# Patient Record
Sex: FEMALE | Race: WHITE | NOT HISPANIC OR LATINO | Employment: OTHER | URBAN - METROPOLITAN AREA
[De-identification: names, ages, dates, MRNs, and addresses within clinical notes are randomized per-mention and may not be internally consistent; named-entity substitution may affect disease eponyms.]

---

## 2017-01-16 ENCOUNTER — OFFICE VISIT (OUTPATIENT)
Dept: UROLOGY | Facility: CLINIC | Age: 82
End: 2017-01-16
Payer: MEDICARE

## 2017-01-16 VITALS
SYSTOLIC BLOOD PRESSURE: 138 MMHG | BODY MASS INDEX: 20.38 KG/M2 | HEIGHT: 63 IN | WEIGHT: 115 LBS | RESPIRATION RATE: 16 BRPM | HEART RATE: 60 BPM | DIASTOLIC BLOOD PRESSURE: 62 MMHG

## 2017-01-16 DIAGNOSIS — N39.0 RECURRENT UTI: Primary | ICD-10-CM

## 2017-01-16 PROCEDURE — 1160F RVW MEDS BY RX/DR IN RCRD: CPT | Mod: S$GLB,,, | Performed by: UROLOGY

## 2017-01-16 PROCEDURE — 1159F MED LIST DOCD IN RCRD: CPT | Mod: S$GLB,,, | Performed by: UROLOGY

## 2017-01-16 PROCEDURE — 1126F AMNT PAIN NOTED NONE PRSNT: CPT | Mod: S$GLB,,, | Performed by: UROLOGY

## 2017-01-16 PROCEDURE — 99999 PR PBB SHADOW E&M-EST. PATIENT-LVL III: CPT | Mod: PBBFAC,,, | Performed by: UROLOGY

## 2017-01-16 PROCEDURE — 1157F ADVNC CARE PLAN IN RCRD: CPT | Mod: S$GLB,,, | Performed by: UROLOGY

## 2017-01-16 PROCEDURE — 99214 OFFICE O/P EST MOD 30 MIN: CPT | Mod: S$GLB,,, | Performed by: UROLOGY

## 2017-01-16 NOTE — MR AVS SNAPSHOT
[facility]  [address]  [place] [address]  Phone: [phone]                  Lang Kumar   2017 3:00 PM   Office Visit    Description:  Female : 1932   Provider:  Jacklyn Byrne MD   [facility]  [facility]           Reason for Visit     Urinary Tract Infection           Diagnoses this Visit        Comments    Recurrent UTI    -  Primary            To Do List           Future Appointments        Provider Department Dept Phone    2017 10:00 AM Moon Mccallum MD [facility] [phone]    2017 10:15 AM LAB, [place] [facility] [phone]    3/9/2017 2:00 PM Erick Hernandez MD [facility] Card[facility] [phone]      Goals (5 Years of Data)     None      Follow-Up and Disposition     Return if symptoms worsen or fail to improve.      [facility]     [facility] Care [facility] -  Assistance  Registered nurses in the [facility] provide clinical advisement, health education, appointment booking, and other advisory services.  Call for this free service at [phone].             Medications           Message regarding Medications     Verify the changes and/or additions to your medication regime listed below are the same as discussed with your clinician today.  If any of these changes or additions are incorrect, please notify your healthcare provider.             Verify that the below list of medications is an accurate representation of the medications you are currently taking.  If none reported, the list may be blank. If incorrect, please contact your healthcare provider. Carry this list with you in case of emergency.           Current Medications     aspirin (ECOTRIN) 81 MG EC tablet Take 1 tablet (81 mg total) by mouth once daily.    atorvastatin (LIPITOR) 10 MG tablet Take 1 tablet (10 mg total) by mouth once daily.    ergocalciferol (VITAMIN D2) 50,000 unit Cap Take 1  "capsule (50,000 Units total) by mouth every 7 days.    multivitamin (ONE DAILY MULTIVITAMIN) per tablet Take 1 tablet by mouth once daily.           Clinical Reference Information           Vital Signs - Last Recorded  Most recent update: 1/16/2017  3:42 PM by Brittany Graves MA    BP Pulse Resp Ht Wt BMI    138/62 60 16 5' 3" (1.6 m) 52.2 kg (115 lb) 20.37 kg/m2      Blood Pressure          Most Recent Value    BP  138/62      Allergies as of 1/16/2017     No Known Allergies      Immunizations Administered on Date of Encounter - 1/16/2017     None      "

## 2017-01-16 NOTE — LETTER
January 16, 2017      Crystal Chandler, FNP-C  [address]  [facility] [address]           [facility]  [address]  [facility] [address]  Phone: [phone]          Patient: Lang Kumar   MR Number: [id number]   YOB: 1932   Date of Visit: 1/16/2017       Dear Crystal Chandler:    Thank you for referring Lang Kumar to me for evaluation. Attached you will find relevant portions of my assessment and plan of care.    If you have questions, please do not hesitate to call me. I look forward to following Lang Kumar along with you.    Sincerely,    Jacklyn Byrne MD    Enclosure  CC:  No Recipients    If you would like to receive this communication electronically, please contact [email or web address] or [phone] to request more information on [employer] access.    For providers and/or their staff who would like to refer a patient to [facility], please contact us through our one-stop-shop provider referral line, [facility] , at [phone].    If you feel you have received this communication in error or would no longer like to receive these types of communications, please e-mail [email or web address]

## 2017-01-16 NOTE — PROGRESS NOTES
"  Subjective:       Lang Kumar is a 84 y.o. female who is a new patient who was referred by [facility] NP for evaluation of dysuria.      She is a previous pt of Dr Brice. She is s/p LeForte colpocleisis, perineorrhaphy, posterior repair on 8/19/2014. She had UTIs prior to this per her report but was initially improved. She reported dysuria and vaginal burning around time of UTI. She was treated for UTI in 11/16 (Macrobid). She was using Azo for dysuria PRN. Her symptoms have cleared and she is feeling well today.    UCx:   12/16 - neg  11/16 - >100k E coli, multi-resistant      The following portions of the patient's history were reviewed and updated as appropriate: allergies, current medications, past family history, past medical history, past social history, past surgical history and problem list.    Review of Systems  Constitutional: no fever or chills  ENT: no nasal congestion or sore throat  Respiratory: no cough or shortness of breath  Cardiovascular: no chest pain or palpitations  Gastrointestinal: no nausea or vomiting, tolerating diet  Genitourinary: as per HPI  Hematologic/Lymphatic: no easy bruising or lymphadenopathy  Musculoskeletal: no arthralgias or myalgias  Skin: no rashes or lesions  Neurological: no seizures or tremors  Behavioral/Psych: no auditory or visual hallucinations       Objective:    Vitals:   Visit Vitals    /62    Pulse 60    Resp 16    Ht 5' 3" (1.6 m)    Wt 52.2 kg (115 lb)    BMI 20.37 kg/m2       Physical Exam   General: well developed, well nourished in no acute distress  Head: normocephalic, atraumatic  Neck: supple, trachea midline, no obvious enlargement of thyroid  HEENT: EOMI, mucus membranes moist, sclera anicteric, no hearing impairment  Lungs: symmetric expansion, non-labored breathing  Cardiovascular: regular rate and rhythm, normal pulses  Abdomen: soft, non tender, non distended, no palpable masses, no hepatosplenomegaly, no hernias, no CVA " tenderness  Musculoskeletal: no peripheral edema, normal ROM in bilateral upper and lower extremities  Lymphatics: no cervical or inguinal lymphadenopathy  Skin: no rashes or lesions  Neuro: alert and oriented x 3, no gross deficits  Psych: normal judgment and insight, normal mood/affect and non-anxious  Genitourinary:   patient declined exam      Lab Review   Urine analysis today in clinic shows negative for all components     Lab Results   Component Value Date    WBC 12.26 11/21/2016    HGB 14.0 11/21/2016    HCT 42.2 11/21/2016    MCV 94 11/21/2016     11/21/2016     Lab Results   Component Value Date    CREATININE 0.8 11/21/2016    BUN 10 11/21/2016       Imaging  NA         Assessment/Plan:      1. Recurrent UTI    - Not as much of a issue recently after colpocleisis   - UA clear today   - One UTI in 11/16   - Doing great otherwise, will hold on further workup   - Call with UTI symptoms for UCx   - Discussed Axo, okay to take PRN only         Follow up PRN

## 2017-02-01 ENCOUNTER — OFFICE VISIT (OUTPATIENT)
Dept: FAMILY MEDICINE | Facility: CLINIC | Age: 82
End: 2017-02-01
Payer: MEDICARE

## 2017-02-01 VITALS
HEIGHT: 63 IN | HEART RATE: 71 BPM | BODY MASS INDEX: 21.07 KG/M2 | SYSTOLIC BLOOD PRESSURE: 122 MMHG | TEMPERATURE: 98 F | OXYGEN SATURATION: 97 % | DIASTOLIC BLOOD PRESSURE: 68 MMHG | WEIGHT: 118.94 LBS

## 2017-02-01 DIAGNOSIS — M81.0 OSTEOPOROSIS, POST-MENOPAUSAL: Primary | ICD-10-CM

## 2017-02-01 DIAGNOSIS — E55.9 VITAMIN D DEFICIENCY DISEASE: ICD-10-CM

## 2017-02-01 DIAGNOSIS — I70.0 ATHEROSCLEROSIS OF AORTIC ARCH: ICD-10-CM

## 2017-02-01 PROCEDURE — 99499 UNLISTED E&M SERVICE: CPT | Mod: S$GLB,,, | Performed by: INTERNAL MEDICINE

## 2017-02-01 PROCEDURE — 1126F AMNT PAIN NOTED NONE PRSNT: CPT | Mod: S$GLB,,, | Performed by: INTERNAL MEDICINE

## 2017-02-01 PROCEDURE — 1160F RVW MEDS BY RX/DR IN RCRD: CPT | Mod: S$GLB,,, | Performed by: INTERNAL MEDICINE

## 2017-02-01 PROCEDURE — 99214 OFFICE O/P EST MOD 30 MIN: CPT | Mod: S$GLB,,, | Performed by: INTERNAL MEDICINE

## 2017-02-01 PROCEDURE — 1159F MED LIST DOCD IN RCRD: CPT | Mod: S$GLB,,, | Performed by: INTERNAL MEDICINE

## 2017-02-01 PROCEDURE — 1157F ADVNC CARE PLAN IN RCRD: CPT | Mod: S$GLB,,, | Performed by: INTERNAL MEDICINE

## 2017-02-01 PROCEDURE — 99999 PR PBB SHADOW E&M-EST. PATIENT-LVL III: CPT | Mod: PBBFAC,,, | Performed by: INTERNAL MEDICINE

## 2017-02-01 RX ORDER — ALENDRONATE SODIUM 70 MG/1
70 TABLET ORAL
Qty: 12 TABLET | Refills: 3 | Status: SHIPPED | OUTPATIENT
Start: 2017-02-01 | End: 2018-04-09 | Stop reason: SDUPTHER

## 2017-02-01 NOTE — PROGRESS NOTES
HISTORY OF PRESENT ILLNESS:  Lang Kumar is a 84 y.o. female who presents to the clinic today for Hyperlipidemia and Follow-up  .  The patient presents to clinic today for treatment of osteoporosis which was found on recent bone density test.  She was started on high-dose vitamin D supplementation.  She denies any fracture.  She reports fair amounts of calcium in her diet.  She gets minimal sun exposure.      PAST MEDICAL HISTORY:  Past Medical History   Diagnosis Date    Abnormal exam or test finding 12/2011     coronary calcium score 204 = moderate plaque burden and high coronary heart disease risk    Arthritis     Atherosclerosis of aortic arch      - noted on CXR 11/2016    Borderline hyperlipidemia     Coronary artery disease involving native coronary artery of native heart without angina pectoris 11/30/2016    Osteoporosis, post-menopausal     Urge and stress incontinence      uses pessary; followed by gynecology    Uterine prolapse     Vitamin D deficiency disease        PAST SURGICAL HISTORY:  Past Surgical History   Procedure Laterality Date    Cosmetic surgery      Cataract extraction, bilateral         SOCIAL HISTORY:  Social History     Social History    Marital status:      Spouse name: N/A    Number of children: 3    Years of education: N/A     Occupational History    teacher - retired      Social History Main Topics    Smoking status: Former Smoker     Packs/day: 0.50     Types: Cigarettes     Quit date: 5/22/1970    Smokeless tobacco: Never Used    Alcohol use 1.2 oz/week     2 Glasses of wine per week      Comment: once a week    Drug use: No    Sexual activity: No     Other Topics Concern    Not on file     Social History Narrative       FAMILY HISTORY:  Family History   Problem Relation Age of Onset    Lung cancer Sister      smoker    Coronary artery disease Father     Stroke Mother     Fibromyalgia Sister     Arthritis Sister      back and knee     Breast  "cancer Neg Hx     Ovarian cancer Neg Hx     Diabetes Neg Hx     Colon cancer Neg Hx        ALLERGIES AND MEDICATIONS: updated and reviewed.  Review of patient's allergies indicates:  No Known Allergies  Medication List with Changes/Refills   New Medications    ALENDRONATE (FOSAMAX) 70 MG TABLET    Take 1 tablet (70 mg total) by mouth every 7 days.   Current Medications    ASPIRIN (ECOTRIN) 81 MG EC TABLET    Take 1 tablet (81 mg total) by mouth once daily.    ATORVASTATIN (LIPITOR) 10 MG TABLET    Take 1 tablet (10 mg total) by mouth once daily.    ERGOCALCIFEROL (VITAMIN D2) 50,000 UNIT CAP    Take 1 capsule (50,000 Units total) by mouth every 7 days.    MULTIVITAMIN (ONE DAILY MULTIVITAMIN) PER TABLET    Take 1 tablet by mouth once daily.            REVIEW OF SYSTEMS:  General ROS: negative for - chills, fever or malaise  Psychological ROS: negative for - anxiety, depression or suicidal ideation  Allergy and Immunology ROS: negative for - hives  Hematological and Lymphatic ROS: negative for - swollen lymph nodes  Endocrine ROS: negative for - polydipsia/polyuria or temperature intolerance  Respiratory ROS: no cough, shortness of breath, or wheezing  Cardiovascular ROS: no chest pain or dyspnea on exertion  Gastrointestinal ROS: no abdominal pain, change in bowel habits, or black or bloody stools  Dermatological ROS: negative for mole changes and rash  Musculoskeletal ROS: negative for - gait disturbance, joint swelling, muscle pain or muscular weakness  Neurological ROS: no TIA or stroke symptoms      PHYSICAL EXAM:   Vitals:    02/01/17 1012   BP: 122/68   Pulse: 71   Temp: 98.4 °F (36.9 °C)     Weight: 53.9 kg (118 lb 15 oz)   Height: 5' 3" (160 cm)   Body mass index is 21.07 kg/(m^2).     General appearance - alert, well appearing, and in no distress and normal appearing weight  Mental status - alert, oriented to person, place, and time, normal mood, behavior, speech, dress, motor activity, and thought " processes  Eyes - pupils equal and reactive, extraocular eye movements intact, sclera anicteric  Mouth - mucous membranes moist, pharynx normal without lesions  Neck - supple, no significant adenopathy, carotids upstroke normal bilaterally, no bruits, thyroid exam: thyroid is normal in size without nodules or tenderness  Lymphatics - no palpable lymphadenopathy  Chest - clear to auscultation, no wheezes, rales or rhonchi, symmetric air entry  Heart - normal rate and regular rhythm, no gallops noted  Abdomen - soft, nontender, nondistended, no masses or organomegaly  Neurological - alert, oriented, normal speech, no focal findings or movement disorder noted, cranial nerves II through XII intact  Musculoskeletal - no joint tenderness, deformity or swelling, no muscular tenderness noted  Extremities - peripheral pulses normal, no pedal edema, no clubbing or cyanosis  Skin - normal coloration and turgor, no rashes, no suspicious skin lesions noted      ASSESSMENT AND PLAN:  1. Osteoporosis, post-menopausal  Based on recent bone density test results I'll start the patient on Fosamax.  We discussed risks, benefits, and side effects.  She will let me know if she has any problems.  Recheck bone density test in 2 years.  - alendronate (FOSAMAX) 70 MG tablet; Take 1 tablet (70 mg total) by mouth every 7 days.  Dispense: 12 tablet; Refill: 3    2. Vitamin D deficiency disease  We discussed adequate supplementation.  She is currently on 50,000 units once a week.  Recheck blood work in one year.    3. Atherosclerosis of aortic arch  Patient with Atherosclerosis of the Aorta.  Stable/asymptomatic. Currently stable on lipid lowering medication and b/p monitoring.           Return in about 4 months (around 6/1/2017), or if symptoms worsen or fail to improve, for follow up chronic medical conditions.. or sooner as needed.

## 2017-03-09 ENCOUNTER — LAB VISIT (OUTPATIENT)
Dept: LAB | Facility: HOSPITAL | Age: 82
End: 2017-03-09
Attending: INTERNAL MEDICINE
Payer: MEDICARE

## 2017-03-09 DIAGNOSIS — I25.10 CORONARY ARTERY DISEASE INVOLVING NATIVE CORONARY ARTERY OF NATIVE HEART WITHOUT ANGINA PECTORIS: ICD-10-CM

## 2017-03-09 LAB
ALBUMIN SERPL BCP-MCNC: 3.8 G/DL
ALP SERPL-CCNC: 68 U/L
ALT SERPL W/O P-5'-P-CCNC: 14 U/L
AST SERPL-CCNC: 18 U/L
BILIRUB DIRECT SERPL-MCNC: 0.2 MG/DL
BILIRUB SERPL-MCNC: 0.6 MG/DL
CHOLEST/HDLC SERPL: 3.4 {RATIO}
HDL/CHOLESTEROL RATIO: 29.5 %
HDLC SERPL-MCNC: 251 MG/DL
HDLC SERPL-MCNC: 74 MG/DL
LDLC SERPL CALC-MCNC: 157.8 MG/DL
NONHDLC SERPL-MCNC: 177 MG/DL
PROT SERPL-MCNC: 7.2 G/DL
TRIGL SERPL-MCNC: 96 MG/DL

## 2017-03-09 PROCEDURE — 36415 COLL VENOUS BLD VENIPUNCTURE: CPT | Mod: PO

## 2017-03-09 PROCEDURE — 80076 HEPATIC FUNCTION PANEL: CPT

## 2017-03-09 PROCEDURE — 80061 LIPID PANEL: CPT

## 2017-03-10 ENCOUNTER — OFFICE VISIT (OUTPATIENT)
Dept: CARDIOLOGY | Facility: CLINIC | Age: 82
End: 2017-03-10
Payer: MEDICARE

## 2017-03-10 VITALS
HEART RATE: 76 BPM | RESPIRATION RATE: 15 BRPM | DIASTOLIC BLOOD PRESSURE: 76 MMHG | HEIGHT: 63 IN | BODY MASS INDEX: 20.2 KG/M2 | SYSTOLIC BLOOD PRESSURE: 150 MMHG | WEIGHT: 114 LBS | OXYGEN SATURATION: 95 %

## 2017-03-10 DIAGNOSIS — E78.5 HYPERLIPIDEMIA, UNSPECIFIED HYPERLIPIDEMIA TYPE: ICD-10-CM

## 2017-03-10 DIAGNOSIS — I25.10 CORONARY ARTERY DISEASE INVOLVING NATIVE CORONARY ARTERY OF NATIVE HEART WITHOUT ANGINA PECTORIS: Primary | ICD-10-CM

## 2017-03-10 DIAGNOSIS — I70.0 ATHEROSCLEROSIS OF AORTIC ARCH: ICD-10-CM

## 2017-03-10 PROCEDURE — 1160F RVW MEDS BY RX/DR IN RCRD: CPT | Mod: S$GLB,,, | Performed by: INTERNAL MEDICINE

## 2017-03-10 PROCEDURE — 1159F MED LIST DOCD IN RCRD: CPT | Mod: S$GLB,,, | Performed by: INTERNAL MEDICINE

## 2017-03-10 PROCEDURE — 99214 OFFICE O/P EST MOD 30 MIN: CPT | Mod: S$GLB,,, | Performed by: INTERNAL MEDICINE

## 2017-03-10 PROCEDURE — 99999 PR PBB SHADOW E&M-EST. PATIENT-LVL III: CPT | Mod: PBBFAC,,, | Performed by: INTERNAL MEDICINE

## 2017-03-10 PROCEDURE — 1157F ADVNC CARE PLAN IN RCRD: CPT | Mod: S$GLB,,, | Performed by: INTERNAL MEDICINE

## 2017-03-10 PROCEDURE — 1126F AMNT PAIN NOTED NONE PRSNT: CPT | Mod: S$GLB,,, | Performed by: INTERNAL MEDICINE

## 2017-03-10 RX ORDER — ATORVASTATIN CALCIUM 10 MG/1
10 TABLET, FILM COATED ORAL DAILY
Qty: 90 TABLET | Refills: 3 | Status: SHIPPED | OUTPATIENT
Start: 2017-03-10 | End: 2017-06-29

## 2017-03-10 NOTE — PROGRESS NOTES
CARDIOVASCULAR PROGRESS NOTE    REASON FOR CONSULT:   Lang Kumar is a 84 y.o. female who presents for f/u of presumed CAD.    PCP: Xena  HISTORY OF PRESENT ILLNESS:   The patient returns for follow-up.  She reports generally stable status without angina or dyspnea.  There's been no palpitations, lightheadedness, dizziness, or syncope.  She denies PND, orthopnea, or lower extremity edema.  She's had no melena, hematuria, or claudicant symptoms.    I reviewed the patient's lipid panel, noting an increase in her total and LDL cholesterol.  It appears that the patient is not taking her atorvastatin as prescribed, and she seems to blame this on a memory disturbance.  I've instructed her to get the Lipitor filled, and we'll plan to repeat her lipid panel in 3 months.    CARDIOVASCULAR HISTORY:   CAD (presumed, Cor Ca+ 204 12/2011)    PAST MEDICAL HISTORY:     Past Medical History:   Diagnosis Date    Abnormal exam or test finding 12/2011    coronary calcium score 204 = moderate plaque burden and high coronary heart disease risk    Arthritis     Atherosclerosis of aortic arch     - noted on CXR 11/2016    Borderline hyperlipidemia     Coronary artery disease involving native coronary artery of native heart without angina pectoris 11/30/2016    Osteoporosis, post-menopausal     Urge and stress incontinence     uses pessary; followed by gynecology    Uterine prolapse     Vitamin D deficiency disease        PAST SURGICAL HISTORY:     Past Surgical History:   Procedure Laterality Date    CATARACT EXTRACTION, BILATERAL      COSMETIC SURGERY         ALLERGIES AND MEDICATION:   Review of patient's allergies indicates:  No Known Allergies  Previous Medications    ALENDRONATE (FOSAMAX) 70 MG TABLET    Take 1 tablet (70 mg total) by mouth every 7 days.    ASPIRIN (ECOTRIN) 81 MG EC TABLET    Take 1 tablet (81 mg total) by mouth once daily.    ATORVASTATIN (LIPITOR) 10 MG TABLET    Take 1 tablet (10 mg total) by  mouth once daily.    ERGOCALCIFEROL (VITAMIN D2) 50,000 UNIT CAP    Take 1 capsule (50,000 Units total) by mouth every 7 days.    MULTIVITAMIN (ONE DAILY MULTIVITAMIN) PER TABLET    Take 1 tablet by mouth once daily.       SOCIAL HISTORY:     Social History     Social History    Marital status:      Spouse name: N/A    Number of children: 3    Years of education: N/A     Occupational History    teacher - retired      Social History Main Topics    Smoking status: Former Smoker     Packs/day: 0.50     Types: Cigarettes     Quit date: 5/22/1970    Smokeless tobacco: Never Used    Alcohol use 1.2 oz/week     2 Glasses of wine per week      Comment: once a week    Drug use: No    Sexual activity: No     Other Topics Concern    Not on file     Social History Narrative       FAMILY HISTORY:     Family History   Problem Relation Age of Onset    Lung cancer Sister      smoker    Coronary artery disease Father     Stroke Mother     Fibromyalgia Sister     Arthritis Sister      back and knee     Breast cancer Neg Hx     Ovarian cancer Neg Hx     Diabetes Neg Hx     Colon cancer Neg Hx        REVIEW OF SYSTEMS:   Review of Systems   Constitutional: Negative for chills, diaphoresis and fever.   HENT: Negative for nosebleeds.    Eyes: Negative for blurred vision, double vision and photophobia.   Respiratory: Negative for hemoptysis, shortness of breath and wheezing.    Cardiovascular: Negative for chest pain, palpitations, orthopnea, claudication, leg swelling and PND.   Gastrointestinal: Negative for abdominal pain, blood in stool, heartburn, melena, nausea and vomiting.   Genitourinary: Negative for flank pain and hematuria.   Musculoskeletal: Negative for falls, myalgias and neck pain.   Skin: Negative for rash.   Neurological: Negative for dizziness, seizures, loss of consciousness, weakness and headaches.   Endo/Heme/Allergies: Negative for polydipsia. Does not bruise/bleed easily.  "  Psychiatric/Behavioral: Negative for depression and memory loss. The patient is not nervous/anxious.        PHYSICAL EXAM:     Vitals:    03/10/17 1336   BP: (!) 150/76   Pulse: 76   Resp: 15    Body mass index is 20.19 kg/(m^2).  Weight: 51.7 kg (114 lb)   Height: 5' 3" (160 cm)     Physical Exam   Constitutional: She is oriented to person, place, and time. She appears well-developed and well-nourished. She is cooperative.  Non-toxic appearance. No distress.   HENT:   Head: Normocephalic and atraumatic.   Eyes: Conjunctivae and EOM are normal. Pupils are equal, round, and reactive to light. No scleral icterus.   Neck: Trachea normal. Neck supple. Normal carotid pulses and no JVD present. Carotid bruit is not present. No rigidity. No edema present. No thyromegaly present.   Cardiovascular: Normal rate, regular rhythm, S1 normal and S2 normal.  PMI is not displaced.  Exam reveals no gallop and no friction rub.    No murmur heard.  Pulses:       Carotid pulses are 2+ on the right side, and 2+ on the left side.  Pulmonary/Chest: Effort normal and breath sounds normal. No respiratory distress. She has no wheezes. She has no rales. She exhibits no tenderness.   Abdominal: Soft. Bowel sounds are normal. She exhibits no distension and no mass. There is no hepatosplenomegaly. There is no tenderness.   Musculoskeletal: She exhibits no edema or tenderness.   Feet:   Right Foot:   Skin Integrity: Negative for ulcer.   Left Foot:   Skin Integrity: Negative for ulcer.   Neurological: She is alert and oriented to person, place, and time. No cranial nerve deficit.   presbycusis   Skin: Skin is warm and dry. No rash noted. No erythema.   Psychiatric: She has a normal mood and affect. Her speech is normal and behavior is normal.   Vitals reviewed.      DATA:   EKG: (personally reviewed tracing)  11/30/16 NSR 83    Laboratory:  CBC:    Recent Labs  Lab 08/19/14  1557 11/19/15  1545 11/21/16  0722   WHITE BLOOD CELL COUNT 4.43 7.63 " 12.26   HEMOGLOBIN 11.7 L 13.7 14.0   HEMATOCRIT 34.5 L 41.6 42.2   PLATELETS 213 315 299       CHEMISTRIES:    Recent Labs  Lab 08/19/14  1557 11/19/15  1545 11/21/16  0722   GLUCOSE 106 106 105   SODIUM 143 142 138   POTASSIUM 3.5 4.0 3.8   BUN BLD 11 24 H 10   CREATININE 0.6 0.8 0.8   EGFR IF AFRICAN AMERICAN >60.0 >60.0 >60.0   EGFR IF NON- >60.0 >60.0 >60.0   CALCIUM 8.1 L 9.7 9.3       CARDIAC BIOMARKERS:        COAGS:        LIPIDS/LFTS:    Recent Labs  Lab 11/19/15  1545 11/21/16  0722 03/09/17  0927   CHOLESTEROL  --  217 H 251 H   TRIGLYCERIDES  --  109 96   HDL  --  66 74   LDL CHOLESTEROL  --  129.2 157.8   NON-HDL CHOLESTEROL  --  151 177   AST 16 15 18   ALT 11 11 14       Cardiovascular Testing:  Nataly MPI 12/7/16 (on my pers rev there may be very mild apical ischemia)  The patient exercised for 3.0 minutes on a High Ramp protocol, corresponding to a functional capacity of 5 estimated METS, achieving a peak heart rate of 130 bpm, which is 98% of the age predicted maximum heart rate. -CP/EKG.  Nuclear Quantitative Functional Analysis:   LVEF: >= 70 %  Impression: NORMAL MYOCARDIAL PERFUSION  1. The perfusion scan is free of evidence for myocardial ischemia or injury.   2. Resting wall motion is physiologic.   3. Resting LV function is normal.   4. The ventricular volumes are normal at rest and stress.   5. The extracardiac distribution of radioactivity is normal.     CT cor Ca+ 12/21/2011  C-T CORONARY CALCIUM SCORE  Number of lesions: 4                  Number of vessels involved: 3  Your calcium score is 204 which puts you between the 50th and 75th percentile for your gender and age bracket. (LAD = 105, LCX = 47, RCA = 52)  This score indicates moderate plaque burden and a high coronary heart disease risk. This would suggest that moderate non-obstructive coronary heart disease is highly likely.    Carotid US 12/21/11  No significant stenosis    ASSESSMENT:   # Presumed CAD based on  findings of her coronary CT score in 2011  # HLP, goal LDL <100 given presumption of CAD.  Pt is apparently not taking atorva as previously prescribed.  # ?memory disturbance vs medication noncompliance    PLAN:   Cont med rx given absence of sxs  Agree with statin rx, goal LDL <100 (optional goal <70)  Check lipids/LFT in 3 months (mid June 2017)  RTC 3 months, I've asked the patient to have her daughter come with her to the follow-up office visit.    Erick Hernandez MD, FACC

## 2017-03-10 NOTE — MR AVS SNAPSHOT
[facility]  [address]  [place] [address]  Phone: [phone]                  Lang DICKSON Valence   3/10/2017 2:20 PM   Office Visit    Description:  Female : 1932   Provider:  Erick Hernandez MD   Department:  [facility]           Reason for Visit     Coronary Artery Disease     Results                To Do List           Future Appointments        Provider Department Dept Phone    3/10/2017 2:20 PM Erick Hernandez MD [facility] [phone]      Goals (5 Years of Data)     None      [facility]     [facility] Care [facility] -  Assistance  Registered nurses in the [facility] Center provide clinical advisement, health education, appointment booking, and other advisory services.  Call for this free service at [phone].             Medications           Message regarding Medications     Verify the changes and/or additions to your medication regime listed below are the same as discussed with your clinician today.  If any of these changes or additions are incorrect, please notify your healthcare provider.             Verify that the below list of medications is an accurate representation of the medications you are currently taking.  If none reported, the list may be blank. If incorrect, please contact your healthcare provider. Carry this list with you in case of emergency.           Current Medications     alendronate (FOSAMAX) 70 MG tablet Take 1 tablet (70 mg total) by mouth every 7 days.    aspirin (ECOTRIN) 81 MG EC tablet Take 1 tablet (81 mg total) by mouth once daily.    atorvastatin (LIPITOR) 10 MG tablet Take 1 tablet (10 mg total) by mouth once daily.    ergocalciferol (VITAMIN D2) 50,000 unit Cap Take 1 capsule (50,000 Units total) by mouth every 7 days.    multivitamin (ONE DAILY MULTIVITAMIN) per tablet Take 1 tablet by mouth once daily.           Clinical Reference Information           Your Vitals Were     BP Pulse Resp  "Height Weight SpO2    150/76 76 15 5' 3" (1.6 m) 51.7 kg (114 lb) 95%    BMI                20.19 kg/m2          Blood Pressure          Most Recent Value    BP  (!)  150/76      Allergies as of 3/10/2017     No Known Allergies      Immunizations Administered on Date of Encounter - 3/10/2017     None      Language Assistance Services     ATTENTION: Language assistance services are available, free of charge. Please call [phone].      ATENCIÓN: Si habla español, tiene a powell disposición servicios gratuitos de asistencia lingüística. Llame al [phone].     CHÚ Ý: N?u b?n nói Ti?ng Vi?t, có các d?ch v? h? tr? ngôn ng? mi?n phí dành cho b?n. G?i s? [phone].         [facility] complies with applicable Federal civil rights laws and does not discriminate on the basis of race, color, national origin, age, disability, or sex.        "

## 2017-06-21 ENCOUNTER — LAB VISIT (OUTPATIENT)
Dept: LAB | Facility: HOSPITAL | Age: 82
End: 2017-06-21
Attending: INTERNAL MEDICINE
Payer: MEDICARE

## 2017-06-21 DIAGNOSIS — I25.10 CORONARY ARTERY DISEASE INVOLVING NATIVE CORONARY ARTERY OF NATIVE HEART WITHOUT ANGINA PECTORIS: ICD-10-CM

## 2017-06-21 DIAGNOSIS — E78.5 HYPERLIPIDEMIA, UNSPECIFIED HYPERLIPIDEMIA TYPE: ICD-10-CM

## 2017-06-21 LAB
ALBUMIN SERPL BCP-MCNC: 3.6 G/DL
ALP SERPL-CCNC: 69 U/L
ALT SERPL W/O P-5'-P-CCNC: 11 U/L
AST SERPL-CCNC: 17 U/L
BILIRUB DIRECT SERPL-MCNC: 0.3 MG/DL
BILIRUB SERPL-MCNC: 1 MG/DL
CHOLEST/HDLC SERPL: 3.2 {RATIO}
HDL/CHOLESTEROL RATIO: 31.4 %
HDLC SERPL-MCNC: 229 MG/DL
HDLC SERPL-MCNC: 72 MG/DL
LDLC SERPL CALC-MCNC: 130 MG/DL
NONHDLC SERPL-MCNC: 157 MG/DL
PROT SERPL-MCNC: 6.9 G/DL
TRIGL SERPL-MCNC: 135 MG/DL

## 2017-06-21 PROCEDURE — 80076 HEPATIC FUNCTION PANEL: CPT

## 2017-06-21 PROCEDURE — 36415 COLL VENOUS BLD VENIPUNCTURE: CPT | Mod: PO

## 2017-06-21 PROCEDURE — 80061 LIPID PANEL: CPT

## 2017-06-29 ENCOUNTER — OFFICE VISIT (OUTPATIENT)
Dept: CARDIOLOGY | Facility: CLINIC | Age: 82
End: 2017-06-29
Payer: MEDICARE

## 2017-06-29 VITALS
HEART RATE: 67 BPM | BODY MASS INDEX: 21.09 KG/M2 | OXYGEN SATURATION: 96 % | HEIGHT: 63 IN | WEIGHT: 119 LBS | SYSTOLIC BLOOD PRESSURE: 137 MMHG | RESPIRATION RATE: 15 BRPM | DIASTOLIC BLOOD PRESSURE: 62 MMHG

## 2017-06-29 DIAGNOSIS — E78.5 HYPERLIPIDEMIA, UNSPECIFIED HYPERLIPIDEMIA TYPE: ICD-10-CM

## 2017-06-29 DIAGNOSIS — I25.10 CORONARY ARTERY DISEASE INVOLVING NATIVE CORONARY ARTERY OF NATIVE HEART WITHOUT ANGINA PECTORIS: Primary | ICD-10-CM

## 2017-06-29 PROCEDURE — 1159F MED LIST DOCD IN RCRD: CPT | Mod: S$GLB,,, | Performed by: INTERNAL MEDICINE

## 2017-06-29 PROCEDURE — 1125F AMNT PAIN NOTED PAIN PRSNT: CPT | Mod: S$GLB,,, | Performed by: INTERNAL MEDICINE

## 2017-06-29 PROCEDURE — 99214 OFFICE O/P EST MOD 30 MIN: CPT | Mod: S$GLB,,, | Performed by: INTERNAL MEDICINE

## 2017-06-29 PROCEDURE — 99999 PR PBB SHADOW E&M-EST. PATIENT-LVL III: CPT | Mod: PBBFAC,,, | Performed by: INTERNAL MEDICINE

## 2017-06-29 PROCEDURE — 99499 UNLISTED E&M SERVICE: CPT | Mod: S$GLB,,, | Performed by: INTERNAL MEDICINE

## 2017-06-29 RX ORDER — PRAVASTATIN SODIUM 20 MG/1
20 TABLET ORAL DAILY
Qty: 90 TABLET | Refills: 3 | Status: SHIPPED | OUTPATIENT
Start: 2017-06-29 | End: 2018-01-15

## 2017-06-29 NOTE — PROGRESS NOTES
CARDIOVASCULAR PROGRESS NOTE    REASON FOR CONSULT:   Lang Kumar is a 84 y.o. female who presents for f/u of presumed CAD.    PCP: Xena  HISTORY OF PRESENT ILLNESS:   The patient returns for follow-up.  She is accompanied by her daughter to today's office visit.  She reports generally stable status without angina or dyspnea.  There's been no palpitations, lightheadedness, dizziness, or syncope.  She denies PND, orthopnea, or lower extremity edema.  She's had no melena, hematuria, or claudicant symptoms.  Unfortunately, the patient describes myalgias of both of her thighs knees and the superior portion of her lower legs.  This appears to occur in concert with her use of atorvastatin.    CARDIOVASCULAR HISTORY:   CAD (presumed, Cor Ca+ 204 12/2011)    PAST MEDICAL HISTORY:     Past Medical History:   Diagnosis Date    Abnormal exam or test finding 12/2011    coronary calcium score 204 = moderate plaque burden and high coronary heart disease risk    Arthritis     Atherosclerosis of aortic arch     - noted on CXR 11/2016    Borderline hyperlipidemia     Coronary artery disease involving native coronary artery of native heart without angina pectoris 11/30/2016    Osteoporosis, post-menopausal     Urge and stress incontinence     uses pessary; followed by gynecology    Uterine prolapse     Vitamin D deficiency disease        PAST SURGICAL HISTORY:     Past Surgical History:   Procedure Laterality Date    CATARACT EXTRACTION, BILATERAL      COSMETIC SURGERY         ALLERGIES AND MEDICATION:   Review of patient's allergies indicates:  No Known Allergies  Previous Medications    ALENDRONATE (FOSAMAX) 70 MG TABLET    Take 1 tablet (70 mg total) by mouth every 7 days.    ASPIRIN (ECOTRIN) 81 MG EC TABLET    Take 1 tablet (81 mg total) by mouth once daily.    ATORVASTATIN (LIPITOR) 10 MG TABLET    Take 1 tablet (10 mg total) by mouth once daily.    ERGOCALCIFEROL (VITAMIN D2) 50,000 UNIT CAP    Take 1  capsule (50,000 Units total) by mouth every 7 days.    MULTIVITAMIN (ONE DAILY MULTIVITAMIN) PER TABLET    Take 1 tablet by mouth once daily.       SOCIAL HISTORY:     Social History     Social History    Marital status:      Spouse name: N/A    Number of children: 3    Years of education: N/A     Occupational History    teacher - retired      Social History Main Topics    Smoking status: Former Smoker     Packs/day: 0.50     Types: Cigarettes     Quit date: 5/22/1970    Smokeless tobacco: Never Used    Alcohol use 1.2 oz/week     2 Glasses of wine per week      Comment: once a week    Drug use: No    Sexual activity: No     Other Topics Concern    Not on file     Social History Narrative    No narrative on file       FAMILY HISTORY:     Family History   Problem Relation Age of Onset    Lung cancer Sister      smoker    Coronary artery disease Father     Stroke Mother     Fibromyalgia Sister     Arthritis Sister      back and knee     Breast cancer Neg Hx     Ovarian cancer Neg Hx     Diabetes Neg Hx     Colon cancer Neg Hx        REVIEW OF SYSTEMS:   Review of Systems   Constitutional: Negative for chills, diaphoresis and fever.   HENT: Negative for nosebleeds.    Eyes: Negative for blurred vision, double vision and photophobia.   Respiratory: Negative for hemoptysis, shortness of breath and wheezing.    Cardiovascular: Negative for chest pain, palpitations, orthopnea, claudication, leg swelling and PND.   Gastrointestinal: Negative for abdominal pain, blood in stool, heartburn, melena, nausea and vomiting.   Genitourinary: Negative for flank pain and hematuria.   Musculoskeletal: Negative for falls, myalgias and neck pain.   Skin: Negative for rash.   Neurological: Negative for dizziness, seizures, loss of consciousness, weakness and headaches.   Endo/Heme/Allergies: Negative for polydipsia. Does not bruise/bleed easily.   Psychiatric/Behavioral: Negative for depression and memory loss.  "The patient is not nervous/anxious.        PHYSICAL EXAM:     Vitals:    06/29/17 1345   BP: 137/62   Pulse: 67   Resp: 15    Body mass index is 21.08 kg/m².  Weight: 54 kg (119 lb)   Height: 5' 3" (160 cm)     Physical Exam   Constitutional: She is oriented to person, place, and time. She appears well-developed and well-nourished. She is cooperative.  Non-toxic appearance. No distress.   HENT:   Head: Normocephalic and atraumatic.   Eyes: Conjunctivae and EOM are normal. Pupils are equal, round, and reactive to light. No scleral icterus.   Neck: Trachea normal. Neck supple. Normal carotid pulses and no JVD present. Carotid bruit is not present. No neck rigidity. No edema present. No thyromegaly present.   Cardiovascular: Normal rate, regular rhythm, S1 normal and S2 normal.  PMI is not displaced.  Exam reveals no gallop and no friction rub.    No murmur heard.  Pulses:       Carotid pulses are 2+ on the right side, and 2+ on the left side.  Pulmonary/Chest: Effort normal and breath sounds normal. No respiratory distress. She has no wheezes. She has no rales. She exhibits no tenderness.   Abdominal: Soft. Bowel sounds are normal. She exhibits no distension and no mass. There is no hepatosplenomegaly. There is no tenderness.   Musculoskeletal: She exhibits no edema or tenderness.   Feet:   Right Foot:   Skin Integrity: Negative for ulcer.   Left Foot:   Skin Integrity: Negative for ulcer.   Neurological: She is alert and oriented to person, place, and time. No cranial nerve deficit.   presbycusis   Skin: Skin is warm and dry. No rash noted. No erythema.   Psychiatric: She has a normal mood and affect. Her speech is normal and behavior is normal.   Vitals reviewed.      DATA:   EKG: (personally reviewed tracing)  11/30/16 NSR 83    Laboratory:  CBC:    Recent Labs  Lab 08/19/14  1557 11/19/15  1545 11/21/16  0722   WHITE BLOOD CELL COUNT 4.43 7.63 12.26   HEMOGLOBIN 11.7 L 13.7 14.0   HEMATOCRIT 34.5 L 41.6 42.2 "   PLATELETS 213 315 299       CHEMISTRIES:    Recent Labs  Lab 08/19/14  1557 11/19/15  1545 11/21/16  0722   GLUCOSE 106 106 105   SODIUM 143 142 138   POTASSIUM 3.5 4.0 3.8   BUN BLD 11 24 H 10   CREATININE 0.6 0.8 0.8   EGFR IF AFRICAN AMERICAN >60.0 >60.0 >60.0   EGFR IF NON- >60.0 >60.0 >60.0   CALCIUM 8.1 L 9.7 9.3       CARDIAC BIOMARKERS:        COAGS:        LIPIDS/LFTS:    Recent Labs  Lab 11/21/16  0722 03/09/17  0927 06/21/17  0858   CHOLESTEROL 217 H 251 H 229 H   TRIGLYCERIDES 109 96 135   HDL 66 74 72   LDL CHOLESTEROL 129.2 157.8 130.0   NON-HDL CHOLESTEROL 151 177 157   AST 15 18 17   ALT 11 14 11       Cardiovascular Testing:  Nataly MPI 12/7/16 (on my pers rev there may be very mild apical ischemia)  The patient exercised for 3.0 minutes on a High Ramp protocol, corresponding to a functional capacity of 5 estimated METS, achieving a peak heart rate of 130 bpm, which is 98% of the age predicted maximum heart rate. -CP/EKG.  Nuclear Quantitative Functional Analysis:   LVEF: >= 70 %  Impression: NORMAL MYOCARDIAL PERFUSION  1. The perfusion scan is free of evidence for myocardial ischemia or injury.   2. Resting wall motion is physiologic.   3. Resting LV function is normal.   4. The ventricular volumes are normal at rest and stress.   5. The extracardiac distribution of radioactivity is normal.     CT cor Ca+ 12/21/2011  C-T CORONARY CALCIUM SCORE  Number of lesions: 4                  Number of vessels involved: 3  Your calcium score is 204 which puts you between the 50th and 75th percentile for your gender and age bracket. (LAD = 105, LCX = 47, RCA = 52)  This score indicates moderate plaque burden and a high coronary heart disease risk. This would suggest that moderate non-obstructive coronary heart disease is highly likely.    Carotid US 12/21/11  No significant stenosis    ASSESSMENT:   # Presumed CAD based on findings of her coronary CT score in 2011  # HLP, goal LDL <100 given  presumption of CAD.  Pt intol of atorva 10mg  # ?memory disturbance vs medication noncompliance    PLAN:   Cont med rx given absence of sxs  Change atorva to prava 20mg qhs, goal LDL <100 (optional goal <70)  Check lipids/LFT in 3 months (late sept 2017)  RTC 3 months    Erick Hernandez MD, FACC

## 2017-08-30 ENCOUNTER — LAB VISIT (OUTPATIENT)
Dept: LAB | Facility: HOSPITAL | Age: 82
End: 2017-08-30
Attending: INTERNAL MEDICINE
Payer: MEDICARE

## 2017-08-30 DIAGNOSIS — E78.5 HYPERLIPIDEMIA, UNSPECIFIED HYPERLIPIDEMIA TYPE: ICD-10-CM

## 2017-08-30 DIAGNOSIS — I25.10 CORONARY ARTERY DISEASE INVOLVING NATIVE CORONARY ARTERY OF NATIVE HEART WITHOUT ANGINA PECTORIS: ICD-10-CM

## 2017-08-30 LAB
ALBUMIN SERPL BCP-MCNC: 3.3 G/DL
ALP SERPL-CCNC: 57 U/L
ALT SERPL W/O P-5'-P-CCNC: 10 U/L
AST SERPL-CCNC: 16 U/L
BILIRUB DIRECT SERPL-MCNC: 0.3 MG/DL
BILIRUB SERPL-MCNC: 0.7 MG/DL
CHOLEST SERPL-MCNC: 220 MG/DL
CHOLEST/HDLC SERPL: 3.2 {RATIO}
HDLC SERPL-MCNC: 69 MG/DL
HDLC SERPL: 31.4 %
LDLC SERPL CALC-MCNC: 130.8 MG/DL
NONHDLC SERPL-MCNC: 151 MG/DL
PROT SERPL-MCNC: 6.5 G/DL
TRIGL SERPL-MCNC: 101 MG/DL

## 2017-08-30 PROCEDURE — 80076 HEPATIC FUNCTION PANEL: CPT

## 2017-08-30 PROCEDURE — 36415 COLL VENOUS BLD VENIPUNCTURE: CPT | Mod: PO

## 2017-08-30 PROCEDURE — 80061 LIPID PANEL: CPT

## 2017-09-05 ENCOUNTER — OFFICE VISIT (OUTPATIENT)
Dept: CARDIOLOGY | Facility: CLINIC | Age: 82
End: 2017-09-05
Payer: MEDICARE

## 2017-09-05 VITALS
BODY MASS INDEX: 21.99 KG/M2 | DIASTOLIC BLOOD PRESSURE: 60 MMHG | SYSTOLIC BLOOD PRESSURE: 130 MMHG | HEIGHT: 63 IN | HEART RATE: 73 BPM | RESPIRATION RATE: 15 BRPM | WEIGHT: 124.13 LBS | OXYGEN SATURATION: 96 %

## 2017-09-05 DIAGNOSIS — I25.10 CORONARY ARTERY DISEASE INVOLVING NATIVE CORONARY ARTERY OF NATIVE HEART WITHOUT ANGINA PECTORIS: Primary | ICD-10-CM

## 2017-09-05 DIAGNOSIS — E78.5 HYPERLIPIDEMIA, UNSPECIFIED HYPERLIPIDEMIA TYPE: ICD-10-CM

## 2017-09-05 PROCEDURE — 93000 ELECTROCARDIOGRAM COMPLETE: CPT | Mod: S$GLB,,, | Performed by: INTERNAL MEDICINE

## 2017-09-05 PROCEDURE — 1126F AMNT PAIN NOTED NONE PRSNT: CPT | Mod: S$GLB,,, | Performed by: INTERNAL MEDICINE

## 2017-09-05 PROCEDURE — 3008F BODY MASS INDEX DOCD: CPT | Mod: S$GLB,,, | Performed by: INTERNAL MEDICINE

## 2017-09-05 PROCEDURE — 99499 UNLISTED E&M SERVICE: CPT | Mod: S$GLB,,, | Performed by: INTERNAL MEDICINE

## 2017-09-05 PROCEDURE — 99214 OFFICE O/P EST MOD 30 MIN: CPT | Mod: S$GLB,,, | Performed by: INTERNAL MEDICINE

## 2017-09-05 PROCEDURE — 99999 PR PBB SHADOW E&M-EST. PATIENT-LVL III: CPT | Mod: PBBFAC,,, | Performed by: INTERNAL MEDICINE

## 2017-09-05 PROCEDURE — 1159F MED LIST DOCD IN RCRD: CPT | Mod: S$GLB,,, | Performed by: INTERNAL MEDICINE

## 2017-09-05 NOTE — PROGRESS NOTES
CARDIOVASCULAR PROGRESS NOTE    REASON FOR CONSULT:   Lang Kumar is a 84 y.o. female who presents for f/u of presumed CAD.    PCP: Xena  HISTORY OF PRESENT ILLNESS:   The patient returns for follow-up.  She denies intercurrent angina or dyspnea.  She's had no palpitations, lightheadedness, dizziness, or syncope.  She has no PND, orthopnea, or lower extremity edema.  There's been no melena, hematuria, or claudicant symptoms.  She does continue to complain of myalgic-type symptoms of both of her legs, which is particularly bothersome at the beginning of the day.  It appears to resolve as the day goes along.  She is taking the pravastatin to the best of her abilities.    CARDIOVASCULAR HISTORY:   CAD (presumed, Cor Ca+ 204 12/2011)    PAST MEDICAL HISTORY:     Past Medical History:   Diagnosis Date    Abnormal exam or test finding 12/2011    coronary calcium score 204 = moderate plaque burden and high coronary heart disease risk    Arthritis     Atherosclerosis of aortic arch     - noted on CXR 11/2016    Borderline hyperlipidemia     Coronary artery disease involving native coronary artery of native heart without angina pectoris 11/30/2016    Osteoporosis, post-menopausal     Urge and stress incontinence     uses pessary; followed by gynecology    Uterine prolapse     Vitamin D deficiency disease        PAST SURGICAL HISTORY:     Past Surgical History:   Procedure Laterality Date    CATARACT EXTRACTION, BILATERAL      COSMETIC SURGERY         ALLERGIES AND MEDICATION:   Review of patient's allergies indicates:  No Known Allergies  Previous Medications    ALENDRONATE (FOSAMAX) 70 MG TABLET    Take 1 tablet (70 mg total) by mouth every 7 days.    ASPIRIN (ECOTRIN) 81 MG EC TABLET    Take 1 tablet (81 mg total) by mouth once daily.    ERGOCALCIFEROL (VITAMIN D2) 50,000 UNIT CAP    Take 1 capsule (50,000 Units total) by mouth every 7 days.    MULTIVITAMIN (ONE DAILY MULTIVITAMIN) PER TABLET    Take 1  tablet by mouth once daily.    PRAVASTATIN (PRAVACHOL) 20 MG TABLET    Take 1 tablet (20 mg total) by mouth once daily.       SOCIAL HISTORY:     Social History     Social History    Marital status:      Spouse name: N/A    Number of children: 3    Years of education: N/A     Occupational History    teacher - retired      Social History Main Topics    Smoking status: Former Smoker     Packs/day: 0.50     Types: Cigarettes     Quit date: 5/22/1970    Smokeless tobacco: Never Used    Alcohol use 1.2 oz/week     2 Glasses of wine per week      Comment: once a week    Drug use: No    Sexual activity: No     Other Topics Concern    Not on file     Social History Narrative    No narrative on file       FAMILY HISTORY:     Family History   Problem Relation Age of Onset    Lung cancer Sister      smoker    Coronary artery disease Father     Stroke Mother     Fibromyalgia Sister     Arthritis Sister      back and knee     Breast cancer Neg Hx     Ovarian cancer Neg Hx     Diabetes Neg Hx     Colon cancer Neg Hx        REVIEW OF SYSTEMS:   Review of Systems   Constitutional: Negative for chills, diaphoresis and fever.   HENT: Negative for nosebleeds.    Eyes: Negative for blurred vision, double vision and photophobia.   Respiratory: Negative for hemoptysis, shortness of breath and wheezing.    Cardiovascular: Negative for chest pain, palpitations, orthopnea, claudication, leg swelling and PND.   Gastrointestinal: Negative for abdominal pain, blood in stool, heartburn, melena, nausea and vomiting.   Genitourinary: Negative for flank pain and hematuria.   Musculoskeletal: Negative for falls, myalgias and neck pain.   Skin: Negative for rash.   Neurological: Negative for dizziness, seizures, loss of consciousness, weakness and headaches.   Endo/Heme/Allergies: Negative for polydipsia. Does not bruise/bleed easily.   Psychiatric/Behavioral: Negative for depression and memory loss. The patient is not  "nervous/anxious.        PHYSICAL EXAM:     Vitals:    09/05/17 1318   BP: 130/60   Pulse: 73   Resp: 15    Body mass index is 21.99 kg/m².  Weight: 56.3 kg (124 lb 1.9 oz)   Height: 5' 3" (160 cm)     Physical Exam   Constitutional: She is oriented to person, place, and time. She appears well-developed and well-nourished. She is cooperative.  Non-toxic appearance. No distress.   HENT:   Head: Normocephalic and atraumatic.   Eyes: Conjunctivae and EOM are normal. Pupils are equal, round, and reactive to light. No scleral icterus.   Neck: Trachea normal. Neck supple. Normal carotid pulses and no JVD present. Carotid bruit is not present. No neck rigidity. No edema present. No thyromegaly present.   Cardiovascular: Normal rate, regular rhythm, S1 normal and S2 normal.  PMI is not displaced.  Exam reveals no gallop and no friction rub.    No murmur heard.  Pulses:       Carotid pulses are 2+ on the right side, and 2+ on the left side.  Pulmonary/Chest: Effort normal and breath sounds normal. No respiratory distress. She has no wheezes. She has no rales. She exhibits no tenderness.   Abdominal: Soft. Bowel sounds are normal. She exhibits no distension and no mass. There is no hepatosplenomegaly. There is no tenderness.   Musculoskeletal: She exhibits no edema or tenderness.   Feet:   Right Foot:   Skin Integrity: Negative for ulcer.   Left Foot:   Skin Integrity: Negative for ulcer.   Neurological: She is alert and oriented to person, place, and time. No cranial nerve deficit.   presbycusis   Skin: Skin is warm and dry. No rash noted. No erythema.   Psychiatric: She has a normal mood and affect. Her speech is normal and behavior is normal.   Vitals reviewed.      DATA:   EKG: (personally reviewed tracing)  9/5/17 NSR 65    Laboratory:  CBC:    Recent Labs  Lab 11/19/15  1545 11/21/16  0722   WHITE BLOOD CELL COUNT 7.63 12.26   HEMOGLOBIN 13.7 14.0   HEMATOCRIT 41.6 42.2   PLATELETS 315 299       CHEMISTRIES:    Recent " Labs  Lab 11/19/15  1545 11/21/16  0722   GLUCOSE 106 105   SODIUM 142 138   POTASSIUM 4.0 3.8   BUN BLD 24 H 10   CREATININE 0.8 0.8   EGFR IF AFRICAN AMERICAN >60.0 >60.0   EGFR IF NON- >60.0 >60.0   CALCIUM 9.7 9.3       CARDIAC BIOMARKERS:        COAGS:        LIPIDS/LFTS:    Recent Labs  Lab 03/09/17  0927 06/21/17  0858 08/30/17  0844   CHOLESTEROL 251 H 229 H 220 H   TRIGLYCERIDES 96 135 101   HDL 74 72 69   LDL CHOLESTEROL 157.8 130.0 130.8   NON-HDL CHOLESTEROL 177 157 151   AST 18 17 16   ALT 14 11 10       Cardiovascular Testing:  Nataly MPI 12/7/16 (on my pers rev there may be very mild apical ischemia)  The patient exercised for 3.0 minutes on a High Ramp protocol, corresponding to a functional capacity of 5 estimated METS, achieving a peak heart rate of 130 bpm, which is 98% of the age predicted maximum heart rate. -CP/EKG.  Nuclear Quantitative Functional Analysis:   LVEF: >= 70 %  Impression: NORMAL MYOCARDIAL PERFUSION  1. The perfusion scan is free of evidence for myocardial ischemia or injury.   2. Resting wall motion is physiologic.   3. Resting LV function is normal.   4. The ventricular volumes are normal at rest and stress.   5. The extracardiac distribution of radioactivity is normal.     CT cor Ca+ 12/21/2011  C-T CORONARY CALCIUM SCORE  Number of lesions: 4                  Number of vessels involved: 3  Your calcium score is 204 which puts you between the 50th and 75th percentile for your gender and age bracket. (LAD = 105, LCX = 47, RCA = 52)  This score indicates moderate plaque burden and a high coronary heart disease risk. This would suggest that moderate non-obstructive coronary heart disease is highly likely.    Carotid US 12/21/11  No significant stenosis    ASSESSMENT:   # Presumed CAD based on findings of her coronary CT score in 2011  # HLP, on highest tolerated dose of pravastatin.    PLAN:   Cont med rx given absence of sxs  RTC 6 months    Erick Hernandez MD,  FACC

## 2017-10-24 ENCOUNTER — TELEPHONE (OUTPATIENT)
Dept: FAMILY MEDICINE | Facility: CLINIC | Age: 82
End: 2017-10-24

## 2017-10-24 NOTE — TELEPHONE ENCOUNTER
----- Message from Chastity Corado sent at 10/24/2017  1:17 PM CDT -----  Good Afternoon,               My name is Chastity and I schedule the HRA's here at [facility] in [place]. While I was on the line with patient Lang Kumar , she requested she would like to have her flu shot the same day as her HRA appt. which are scheduled for Friday, 10/27/2017 at 11am. Please contact patient regarding her request. Thanks, your assistance in this matter would be greatly appreciated.

## 2017-11-08 ENCOUNTER — OFFICE VISIT (OUTPATIENT)
Dept: FAMILY MEDICINE | Facility: CLINIC | Age: 82
End: 2017-11-08
Payer: MEDICARE

## 2017-11-08 VITALS
SYSTOLIC BLOOD PRESSURE: 118 MMHG | OXYGEN SATURATION: 97 % | WEIGHT: 124.56 LBS | HEIGHT: 63 IN | HEART RATE: 69 BPM | DIASTOLIC BLOOD PRESSURE: 70 MMHG | BODY MASS INDEX: 22.07 KG/M2

## 2017-11-08 DIAGNOSIS — E78.5 HYPERLIPIDEMIA, UNSPECIFIED HYPERLIPIDEMIA TYPE: ICD-10-CM

## 2017-11-08 DIAGNOSIS — M81.0 OSTEOPOROSIS, POST-MENOPAUSAL: ICD-10-CM

## 2017-11-08 DIAGNOSIS — N39.46 URGE AND STRESS INCONTINENCE: ICD-10-CM

## 2017-11-08 DIAGNOSIS — E55.9 VITAMIN D DEFICIENCY DISEASE: ICD-10-CM

## 2017-11-08 DIAGNOSIS — I70.0 ATHEROSCLEROSIS OF AORTIC ARCH: ICD-10-CM

## 2017-11-08 DIAGNOSIS — Z00.00 ENCOUNTER FOR PREVENTIVE HEALTH EXAMINATION: Primary | ICD-10-CM

## 2017-11-08 DIAGNOSIS — I25.10 CORONARY ARTERY DISEASE INVOLVING NATIVE CORONARY ARTERY OF NATIVE HEART WITHOUT ANGINA PECTORIS: ICD-10-CM

## 2017-11-08 DIAGNOSIS — Z23 NEED FOR INFLUENZA VACCINATION: ICD-10-CM

## 2017-11-08 PROCEDURE — 90662 IIV NO PRSV INCREASED AG IM: CPT | Mod: S$GLB,,, | Performed by: INTERNAL MEDICINE

## 2017-11-08 PROCEDURE — 99999 PR PBB SHADOW E&M-EST. PATIENT-LVL IV: CPT | Mod: PBBFAC,,, | Performed by: NURSE PRACTITIONER

## 2017-11-08 PROCEDURE — G0008 ADMIN INFLUENZA VIRUS VAC: HCPCS | Mod: S$GLB,,, | Performed by: INTERNAL MEDICINE

## 2017-11-08 PROCEDURE — 99499 UNLISTED E&M SERVICE: CPT | Mod: S$GLB,,, | Performed by: NURSE PRACTITIONER

## 2017-11-08 PROCEDURE — G0439 PPPS, SUBSEQ VISIT: HCPCS | Mod: S$GLB,,, | Performed by: NURSE PRACTITIONER

## 2017-11-08 NOTE — PATIENT INSTRUCTIONS
Counseling and Referral of Other Preventative  (Italic type indicates deductible and co-insurance are waived)    Patient Name: Lang Kumar  Today's Date: 11/8/2017      SERVICE LIMITATIONS RECOMMENDATION    Vaccines    · Pneumococcal (once after 65)    · Influenza (annually)    · Hepatitis B (if medium/high risk)    · Prevnar 13      Hepatitis B medium/high risk factors:       - End-stage renal disease       - Hemophiliacs who received Factor VII or         IX concentrates       - Clients of institutions for the mentally             retarded       - Persons who live in the same house as          a HepB carrier       - Homosexual men       - Illicit injectable drug abusers     Pneumococcal: discussed with patient     Influenza: recommended       Hepatitis B: N/A     Prevnar 13: Done, no repeat necessary    Mammogram (biennial age 50-74)  Annually (age 40 or over)  completed August 2013    Pap (up to age 70 and after 70 if unknown history or abnormal study last 10 years)    Per patient and PCP (or OB/GYN specialist) recommendations or if experiencing clinical symptoms.           Colorectal cancer screening (to age 75)    · Fecal occult blood test (annual)  · Flexible sigmoidoscopy (5y)  · Screening colonoscopy (10y)  · Barium enema   N/A    Diabetes self-management training (no USPSTF recommendations)  Requires referral by treating physician for patient with diabetes or renal disease. 10 hours of initial DSMT sessions of no less than 30 minutes each in a continuous 12-month period. 2 hours of follow-up DSMT in subsequent years.  N/A    Bone mass measurements (age 65 & older, biennial)  Requires diagnosis related to osteoporosis or estrogen deficiency. Biennial benefit unless patient has history of long-term glucocorticoid  Last done 11/2016, recommend to repeat every 2  years    Glaucoma screening (no USPSTF recommendation)  Diabetes mellitus, family history   , age 50 or over     American, age 65 or over  Recommend follow up with eye care professional regularly    Medical nutrition therapy for diabetes or renal disease (no recommended schedule)  Requires referral by treating physician for patient with diabetes or renal disease or kidney transplant within the past 3 years.  Can be provided in same year as diabetes self-management training (DSMT), and CMS recommends medical nutrition therapy take place after DSMT. Up to 3 hours for initial year and 2 hours in subsequent years.  N/A    Cardiovascular screening blood tests (every 5 years)  · Fasting lipid panel  Order as a panel if possible  Last done 8/2017, recommend to repeat every 5  years    Diabetes screening tests (at least every 3 years, Medicare covers annually or at 6-month intervals for prediabetic patients)  · Fasting blood sugar (FBS) or glucose tolerance test (GTT)  Patient must be diagnosed with one of the following:       - Hypertension       - Dyslipidemia       - Obesity (BMI 30kg/m2)       - Previous elevated impaired FBS or GTT       ... or any two of the following:       - Overweight (BMI 25 but <30)       - Family history of diabetes       - Age 65 or older       - History of gestational diabetes or birth of baby weighing more than 9 pounds  completed Ha1c November 2016    HIV screening (annually for increased risk patients)  · HIV-1 and HIV-2 by EIA, or FLORINDA, rapid antibody test or oral mucosa transudate  Patients must be at increased risk for HIV infection per USPSTF guidelines or pregnant. Tests covered annually for patient at increased risk or as requested by the patient. Pregnant patients may receive up to 3 tests during pregnancy.  no clinical risk factors      Smoking cessation counseling (up to 8 sessions per year)  Patients must be asymptomatic of tobacco-related conditions to receive as a preventative service.  Non-smoker    Subsequent annual wellness visit  At least 12 months since last AWV  Return in one year      The following information is provided to all patients.  This information is to help you find resources for any of the problems found today that may be affecting your health:                Living healthy guide: www.[facility]      Understanding Diabetes: www.diabetes.org      Eating healthy: www.cdc.gov/healthyweight      CDC home safety checklist: www.cdc.gov/steadi/patient.html      Agency on Aging: www.goea.[place].[facility]      Alcoholics anonymous (AA): www.aa.org      Physical Activity: www.ilan.nih.gov/gc0vlte      Tobacco use: www.quitwithusla.org

## 2017-11-08 NOTE — PROGRESS NOTES
"Lang Valence presented for a  Medicare AWV and comprehensive Health Risk Assessment today. The following components were reviewed and updated:    · Medical history  · Family History  · Social history  · Allergies and Current Medications  · Health Risk Assessment  · Health Maintenance  · Care Team     ** See Completed Assessments for Annual Wellness Visit within the encounter summary.**       The following assessments were completed:  · Living Situation  · CAGE  · Depression Screening  · Timed Get Up and Go  · Whisper Test  · Cognitive Function Screening  · Nutrition Screening  · ADL Screening  · PAQ Screening    Vitals:    11/08/17 1358   BP: 118/70   BP Location: Left arm   Patient Position: Sitting   Pulse: 69   SpO2: 97%   Weight: 56.5 kg (124 lb 9 oz)   Height: 5' 3" (1.6 m)     Body mass index is 22.06 kg/m².  Physical Exam   Constitutional: She is oriented to person, place, and time.   Cardiovascular: Normal rate and regular rhythm.    Pulmonary/Chest: Effort normal.   Musculoskeletal: Normal range of motion.   Neurological: She is alert and oriented to person, place, and time.   Skin: Skin is warm. Capillary refill takes less than 2 seconds.   Psychiatric: She has a normal mood and affect. Her behavior is normal. Thought content normal.   Vitals reviewed.        Diagnoses and health risks identified today and associated recommendations/orders:    1. Encounter for preventive health examination  Education provided about preventive health examinations and procedures; addressed and discussed patient's health concerns. Additionally, reviewed medical record for risk factors and documented the results during this encounter.  - Influenza - High Dose (65+) (PF) (IM)    2. Atherosclerosis of aortic arch  Stable, asymptomatic on ASA, cholesterol managing STATIN, monitor    3. Coronary artery disease involving native coronary artery of native heart without angina pectoris  Stable, asymptomatic on ASA, cholesterol " managing STATIN, monitor    4. Hyperlipidemia, unspecified hyperlipidemia type  Stable, on statin.     5. Osteoporosis, post-menopausal  Stable, taking alendronate as directed and vitamin d.  We discussed exercising, diet, avoidance of sedentary activities, proper gait when taking her daily walks.     6. Vitamin D deficiency disease  Stable, taking supplemental vitamin d.  We discussed exercising, diet, avoidance of sedentary activities, proper gait when taking her daily walks.     7. Urge and stress incontinence  Stable, patient states she wears briefs.  Denies nocturia bedwetting.     8. Need for influenza vaccination  - Influenza - High Dose (65+) (PF) (IM)    Reviewed health maintenance with patient, educated about recommended examinations, procedures (labs & images), and immunizations.       Provided Leverda with a 5-10 year written screening schedule and personal prevention plan. Recommendations were developed using the USPSTF age appropriate recommendations. Education, counseling, and referrals were provided as needed. After Visit Summary printed and given to patient which includes a list of additional screenings\tests needed.    Return in about 1 year (around 11/8/2018) for risk assessment .    Ti Parikh Jr, NP

## 2017-11-09 ENCOUNTER — TELEPHONE (OUTPATIENT)
Dept: OPTOMETRY | Facility: CLINIC | Age: 82
End: 2017-11-09

## 2017-11-10 ENCOUNTER — TELEPHONE (OUTPATIENT)
Dept: OPTOMETRY | Facility: CLINIC | Age: 82
End: 2017-11-10

## 2018-01-08 ENCOUNTER — TELEPHONE (OUTPATIENT)
Dept: FAMILY MEDICINE | Facility: CLINIC | Age: 83
End: 2018-01-08

## 2018-01-08 NOTE — TELEPHONE ENCOUNTER
----- Message from Keila Butterfield sent at 1/8/2018  3:24 PM CST -----  Contact: PT /[phone] AND CELL [phone]  Calling to speak with doc or nurse regarding severe leg pain,and other illness/ health concerns.

## 2018-01-08 NOTE — TELEPHONE ENCOUNTER
Spoke w/patient, request appt. States she has been having left leg pain for a while appt scheduled 1/15/18.

## 2018-01-15 ENCOUNTER — LAB VISIT (OUTPATIENT)
Dept: LAB | Facility: HOSPITAL | Age: 83
End: 2018-01-15
Attending: FAMILY MEDICINE
Payer: MEDICARE

## 2018-01-15 ENCOUNTER — OFFICE VISIT (OUTPATIENT)
Dept: FAMILY MEDICINE | Facility: CLINIC | Age: 83
End: 2018-01-15
Payer: MEDICARE

## 2018-01-15 VITALS
HEIGHT: 63 IN | BODY MASS INDEX: 21.68 KG/M2 | HEART RATE: 72 BPM | OXYGEN SATURATION: 99 % | WEIGHT: 122.38 LBS | DIASTOLIC BLOOD PRESSURE: 66 MMHG | TEMPERATURE: 98 F | SYSTOLIC BLOOD PRESSURE: 96 MMHG

## 2018-01-15 DIAGNOSIS — R41.3 MEMORY LOSS: ICD-10-CM

## 2018-01-15 DIAGNOSIS — I70.0 ATHEROSCLEROSIS OF AORTIC ARCH: ICD-10-CM

## 2018-01-15 DIAGNOSIS — M19.90 OSTEOARTHRITIS, UNSPECIFIED OSTEOARTHRITIS TYPE, UNSPECIFIED SITE: Primary | ICD-10-CM

## 2018-01-15 LAB
ALBUMIN SERPL BCP-MCNC: 3.8 G/DL
ALP SERPL-CCNC: 72 U/L
ALT SERPL W/O P-5'-P-CCNC: 9 U/L
ANION GAP SERPL CALC-SCNC: 8 MMOL/L
AST SERPL-CCNC: 14 U/L
BASOPHILS # BLD AUTO: 0.03 K/UL
BASOPHILS NFR BLD: 0.5 %
BILIRUB SERPL-MCNC: 0.6 MG/DL
BUN SERPL-MCNC: 18 MG/DL
CALCIUM SERPL-MCNC: 9.4 MG/DL
CHLORIDE SERPL-SCNC: 104 MMOL/L
CO2 SERPL-SCNC: 25 MMOL/L
CREAT SERPL-MCNC: 0.8 MG/DL
DIFFERENTIAL METHOD: ABNORMAL
EOSINOPHIL # BLD AUTO: 0.1 K/UL
EOSINOPHIL NFR BLD: 1 %
ERYTHROCYTE [DISTWIDTH] IN BLOOD BY AUTOMATED COUNT: 12.6 %
EST. GFR  (AFRICAN AMERICAN): >60 ML/MIN/1.73 M^2
EST. GFR  (NON AFRICAN AMERICAN): >60 ML/MIN/1.73 M^2
GLUCOSE SERPL-MCNC: 96 MG/DL
HCT VFR BLD AUTO: 41.8 %
HGB BLD-MCNC: 14.1 G/DL
IMM GRANULOCYTES # BLD AUTO: 0.03 K/UL
IMM GRANULOCYTES NFR BLD AUTO: 0.5 %
LYMPHOCYTES # BLD AUTO: 1.5 K/UL
LYMPHOCYTES NFR BLD: 24.4 %
MCH RBC QN AUTO: 31.5 PG
MCHC RBC AUTO-ENTMCNC: 33.7 G/DL
MCV RBC AUTO: 93 FL
MONOCYTES # BLD AUTO: 0.7 K/UL
MONOCYTES NFR BLD: 11.1 %
NEUTROPHILS # BLD AUTO: 3.8 K/UL
NEUTROPHILS NFR BLD: 62.5 %
NRBC BLD-RTO: 0 /100 WBC
PLATELET # BLD AUTO: 266 K/UL
PMV BLD AUTO: 11.5 FL
POTASSIUM SERPL-SCNC: 4.2 MMOL/L
PROT SERPL-MCNC: 7.3 G/DL
RBC # BLD AUTO: 4.48 M/UL
SODIUM SERPL-SCNC: 137 MMOL/L
T4 FREE SERPL-MCNC: 1.12 NG/DL
TSH SERPL DL<=0.005 MIU/L-ACNC: 3.47 UIU/ML
VIT B12 SERPL-MCNC: 301 PG/ML
WBC # BLD AUTO: 6.03 K/UL

## 2018-01-15 PROCEDURE — 80053 COMPREHEN METABOLIC PANEL: CPT

## 2018-01-15 PROCEDURE — 86592 SYPHILIS TEST NON-TREP QUAL: CPT

## 2018-01-15 PROCEDURE — 84443 ASSAY THYROID STIM HORMONE: CPT

## 2018-01-15 PROCEDURE — 99214 OFFICE O/P EST MOD 30 MIN: CPT | Mod: S$GLB,,, | Performed by: FAMILY MEDICINE

## 2018-01-15 PROCEDURE — 99999 PR PBB SHADOW E&M-EST. PATIENT-LVL III: CPT | Mod: PBBFAC,,, | Performed by: FAMILY MEDICINE

## 2018-01-15 PROCEDURE — 85025 COMPLETE CBC W/AUTO DIFF WBC: CPT

## 2018-01-15 PROCEDURE — 82607 VITAMIN B-12: CPT

## 2018-01-15 PROCEDURE — 36415 COLL VENOUS BLD VENIPUNCTURE: CPT | Mod: PO

## 2018-01-15 PROCEDURE — 99499 UNLISTED E&M SERVICE: CPT | Mod: S$GLB,,, | Performed by: FAMILY MEDICINE

## 2018-01-15 PROCEDURE — 84439 ASSAY OF FREE THYROXINE: CPT

## 2018-01-15 RX ORDER — DICLOFENAC SODIUM 10 MG/G
2 GEL TOPICAL 4 TIMES DAILY
Qty: 100 G | Refills: 0 | Status: SHIPPED | OUTPATIENT
Start: 2018-01-15 | End: 2018-02-23 | Stop reason: SDUPTHER

## 2018-01-15 NOTE — PROGRESS NOTES
[facility]  Progress Note    SUBJECTIVE:     Chief Complaint   Patient presents with    Leg Pain    Memory Loss       HPI   Lang Kumar  is a 85 y.o. female here for c/o left lower leg pain. It is worst when she initially wakes up in the AM but improves as the day goes by. No true calf tenderness, and no recent falls/trauma. She rates pain as moderate but completely goes away when she gets going. Hasn't tried anything for it. She is also here with her daughter, who express concerns abut her memory. She is a fully functional independent woman, but admits forgets keys and certain things frequently. Has no problems with daily activities, and remembers all her family members. Doing well otherwise and has no new complaints/problems at this time.     Review of patient's allergies indicates:  No Known Allergies    Past Medical History:   Diagnosis Date    Abnormal exam or test finding 12/2011    coronary calcium score 204 = moderate plaque burden and high coronary heart disease risk    Arthritis     Atherosclerosis of aortic arch     - noted on CXR 11/2016    Borderline hyperlipidemia     Coronary artery disease involving native coronary artery of native heart without angina pectoris 11/30/2016    Osteoporosis, post-menopausal     Urge and stress incontinence     uses pessary; followed by gynecology    Uterine prolapse     Vitamin D deficiency disease      Past Surgical History:   Procedure Laterality Date    CATARACT EXTRACTION, BILATERAL      COSMETIC SURGERY       Family History   Problem Relation Age of Onset    Lung cancer Sister      smoker    Coronary artery disease Father     Stroke Mother     Fibromyalgia Sister     Arthritis Sister      back and knee     Breast cancer Neg Hx     Ovarian cancer Neg Hx     Diabetes Neg Hx     Colon cancer Neg Hx      Social History   Substance Use Topics    Smoking status: Former Smoker     Packs/day: 0.50     Types: Cigarettes     Quit date:  5/22/1970    Smokeless tobacco: Never Used    Alcohol use 1.2 oz/week     2 Glasses of wine per week      Comment: once a week        Review of Systems   Constitutional: Negative for chills, fever and malaise/fatigue.   HENT: Negative.    Respiratory: Negative.  Negative for cough and shortness of breath.    Cardiovascular: Negative.  Negative for chest pain.   Gastrointestinal: Negative.  Negative for abdominal pain, nausea and vomiting.   Genitourinary: Negative.    Musculoskeletal: Positive for joint pain (left leg pain). Negative for back pain, falls, myalgias and neck pain.   Neurological: Negative for weakness and headaches.   All other systems reviewed and are negative.    OBJECTIVE:     Vitals:    01/15/18 0954   BP: 96/66   Pulse: 72   Temp: 97.5 °F (36.4 °C)     Body mass index is 21.67 kg/m².    Physical Exam   Constitutional: She is oriented to person, place, and time and well-developed, well-nourished, and in no distress. No distress.   HENT:   Head: Normocephalic and atraumatic.   Nose: Nose normal.   Eyes: Conjunctivae and EOM are normal.   Cardiovascular: Normal rate, regular rhythm and normal heart sounds.  Exam reveals no gallop and no friction rub.    No murmur heard.  Pulmonary/Chest: Effort normal and breath sounds normal. No respiratory distress. She has no wheezes. She has no rales. She exhibits no tenderness.   Abdominal: Soft. Bowel sounds are normal. She exhibits no distension. There is no tenderness. There is no rebound.   Musculoskeletal:   No calf tenderness. Negative homans sign. Good pedal pulses. No ulcers, dislocations, obvious fractures.   Neurological: She is alert and oriented to person, place, and time.   Skin: Skin is warm. She is not diaphoretic.       Old records were reviewed. Labs and/or images were independently reviewed.    ASSESSMENT     1. Osteoarthritis, unspecified osteoarthritis type, unspecified site    2. Memory loss    3. Atherosclerosis of aortic arch         PLAN:     Osteoarthritis, unspecified osteoarthritis type, unspecified site  -     diclofenac sodium (VOLTAREN) 1 % Gel; Apply 2 g topically 4 (four) times daily.  Dispense: 100 g; Refill: 0  -     Will try conservative treatment. Do not suspect DVT.     Memory loss  -     TSH; Future  -     T4, free; Future  -     RPR; Future; Expected date: 01/15/2018  -     Comprehensive metabolic panel; Future  -     CBC auto differential; Future  -     Vitamin B12; Future; Expected date: 01/15/2018  -     Risk vs benefits of dementia type medications. We've agreed to not go down this route. She is fully independent, and able to take care of her self. Will check some labs but otherwise no new medicaitons.    Atherosclerosis of aortic arch   -     Stable. Continue current regimen.      RTC PRCAMRYN Milner MD  01/15/2018 11:19 AM

## 2018-01-16 LAB — RPR SER QL: NORMAL

## 2018-02-23 DIAGNOSIS — M19.90 OSTEOARTHRITIS, UNSPECIFIED OSTEOARTHRITIS TYPE, UNSPECIFIED SITE: ICD-10-CM

## 2018-02-25 RX ORDER — DICLOFENAC SODIUM 10 MG/G
2 GEL TOPICAL 4 TIMES DAILY
Qty: 100 G | Refills: 0 | Status: SHIPPED | OUTPATIENT
Start: 2018-02-25 | End: 2018-02-26 | Stop reason: SDUPTHER

## 2018-02-26 DIAGNOSIS — M19.90 OSTEOARTHRITIS, UNSPECIFIED OSTEOARTHRITIS TYPE, UNSPECIFIED SITE: ICD-10-CM

## 2018-02-26 RX ORDER — DICLOFENAC SODIUM 10 MG/G
2 GEL TOPICAL 4 TIMES DAILY
Qty: 100 G | Refills: 3 | Status: SHIPPED | OUTPATIENT
Start: 2018-02-26 | End: 2018-04-05 | Stop reason: SDUPTHER

## 2018-04-05 DIAGNOSIS — M19.90 OSTEOARTHRITIS, UNSPECIFIED OSTEOARTHRITIS TYPE, UNSPECIFIED SITE: ICD-10-CM

## 2018-04-05 RX ORDER — DICLOFENAC SODIUM 10 MG/G
2 GEL TOPICAL 4 TIMES DAILY
Qty: 100 G | Refills: 2 | Status: SHIPPED | OUTPATIENT
Start: 2018-04-05 | End: 2018-04-09

## 2018-04-05 NOTE — TELEPHONE ENCOUNTER
----- Message from Clara Chahal sent at 4/5/2018 11:27 AM CDT -----  Contact: self  Patient is requesting a refill on VOLTAREN 1 % Gel . Patient uses [facility] Contact       Address Hours  [address] [facility] [address] [address]   Patient can be reached at [phone].      Thanks,

## 2018-04-09 ENCOUNTER — TELEPHONE (OUTPATIENT)
Dept: FAMILY MEDICINE | Facility: CLINIC | Age: 83
End: 2018-04-09

## 2018-04-09 ENCOUNTER — OFFICE VISIT (OUTPATIENT)
Dept: FAMILY MEDICINE | Facility: CLINIC | Age: 83
End: 2018-04-09
Payer: MEDICARE

## 2018-04-09 ENCOUNTER — HOSPITAL ENCOUNTER (OUTPATIENT)
Dept: RADIOLOGY | Facility: HOSPITAL | Age: 83
Discharge: HOME | End: 2018-04-09
Attending: NURSE PRACTITIONER
Payer: MEDICARE

## 2018-04-09 VITALS
BODY MASS INDEX: 21.44 KG/M2 | OXYGEN SATURATION: 94 % | SYSTOLIC BLOOD PRESSURE: 110 MMHG | HEART RATE: 87 BPM | TEMPERATURE: 98 F | WEIGHT: 121 LBS | DIASTOLIC BLOOD PRESSURE: 70 MMHG | HEIGHT: 63 IN

## 2018-04-09 DIAGNOSIS — M17.12 PRIMARY OSTEOARTHRITIS OF LEFT KNEE: Primary | ICD-10-CM

## 2018-04-09 DIAGNOSIS — M81.0 OSTEOPOROSIS, POST-MENOPAUSAL: ICD-10-CM

## 2018-04-09 DIAGNOSIS — M17.12 PRIMARY OSTEOARTHRITIS OF LEFT KNEE: ICD-10-CM

## 2018-04-09 PROCEDURE — 73562 X-RAY EXAM OF KNEE 3: CPT | Mod: 26,LT,, | Performed by: RADIOLOGY

## 2018-04-09 PROCEDURE — 99214 OFFICE O/P EST MOD 30 MIN: CPT | Mod: S$GLB,,, | Performed by: NURSE PRACTITIONER

## 2018-04-09 PROCEDURE — 73562 X-RAY EXAM OF KNEE 3: CPT | Mod: TC,FY,PO,LT

## 2018-04-09 PROCEDURE — 99999 PR PBB SHADOW E&M-EST. PATIENT-LVL IV: CPT | Mod: PBBFAC,,, | Performed by: NURSE PRACTITIONER

## 2018-04-09 PROCEDURE — 99499 UNLISTED E&M SERVICE: CPT | Mod: S$PBB,,, | Performed by: NURSE PRACTITIONER

## 2018-04-09 RX ORDER — DICLOFENAC SODIUM 25 MG/1
25 TABLET, DELAYED RELEASE ORAL 2 TIMES DAILY
Qty: 60 TABLET | Refills: 0 | Status: SHIPPED | OUTPATIENT
Start: 2018-04-09 | End: 2018-07-02

## 2018-04-09 RX ORDER — ALENDRONATE SODIUM 70 MG/1
70 TABLET ORAL
Qty: 12 TABLET | Refills: 3 | Status: SHIPPED | OUTPATIENT
Start: 2018-04-09 | End: 2018-06-06 | Stop reason: SDUPTHER

## 2018-04-09 NOTE — PATIENT INSTRUCTIONS
Understanding Osteoarthritis of the Knee    A joint is a place where two bones meet. The knee is called a hinge joint. This joint is formed where the thighbone (femur) meets the shinbone (tibia). A healthy knee joint bends freely. Knee osteoarthritis is a condition where parts of the knee joint wear out. This can lead to pain, stiffness, and limited movement.   What is osteoarthritis?  Every joint contains a smooth tissue called cartilage. Cartilage cushions the ends of bones and helps bones in a joint glide smoothly against each other. Knee osteoarthritis occurs when cartilage in the knee joint begins to break down and wear away. Bones may become exposed and rub together. The cartilage may become irritated and rough. This prevents smooth movement of the joint and can lead to pain.  Causes of osteoarthritis of the knee  Causes can include:  · Wear and tear from normal use over time  · Overuse of the knee during sports or work activities  · Being overweight. This increases stress on the knee joint.  · Misalignment of the knee joint  · Injury to the knee  Symptoms of osteoarthritis of the knee  Common symptoms include:  · Pain and swelling around the joint. The pain and swelling get worse with activity and better with rest.  · Grinding sound when moving the knee  · Reduced knee movement  · Knee stiffness. This is often worse first thing in the morning.  Treating osteoarthritis of the knee  Osteoarthritis is a long-term condition. Treatment usually focuses on managing symptoms. Treatment may include:  · Over-the-counter or prescription medicines taken by mouth to help relieve pain and swelling  · Injections of medicine into the joint to help relieve symptoms for a time  · A weight-loss plan for people who are overweight  · A plan of physical therapy and exercises to improve the strength and flexibility of the muscles around the knee  · Heat or cold therapy to help relieve pain and stiffness  · Assistive devices that  help with movement, such as a cane or a walker  · Assistive devices that make activities of daily life easier, such as raised toilet seats or shower bars  If other treatments dont do enough to relieve symptoms, you may need surgery to replace the joint. During this surgery, the damaged joint is removed. An artificial knee joint is then put into place. This can help relieve pain and stiffness and restore movement of the knee.     When to call your healthcare provider  Call your healthcare provider right away if you have any of these:  · Fever of 100.4°F (38°C) or higher, or as directed  · Symptoms that dont get better with prescribed medicines or get worse  · New symptoms   Date Last Reviewed: 3/10/2016  © 1161-4514 [employer]. [address] [place] [address], [place] [address]. All rights reserved. This information is not intended as a substitute for professional medical care. Always follow your healthcare professional's instructions.

## 2018-04-09 NOTE — PROGRESS NOTES
History of Present Illness     Lang Kumar is a 85 y.o. woman with medical history as listed below who presents today for evaluation of left knee pain. This is a chronic issue, history of osteoarthritis. She was seen about two months prior for same complaint and was using Voltaren gel. She states the pharmacist instructed her to return to PCP office as she was requesting refills too soon. She reports pain in the knee with stiffness upon awakening in the morning and with prolonged sitting. This improves throughout the day with movement. She denies redness, swelling, or warmth. She denies numbness or tingling. She has had no injury or trauma. She has no additional complaints and is otherwise healthy on today's visit.      Past Medical History:   Diagnosis Date    Abnormal exam or test finding 12/2011    coronary calcium score 204 = moderate plaque burden and high coronary heart disease risk    Arthritis     Atherosclerosis of aortic arch     - noted on CXR 11/2016    Borderline hyperlipidemia     Coronary artery disease involving native coronary artery of native heart without angina pectoris 11/30/2016    Osteoporosis, post-menopausal     Urge and stress incontinence     uses pessary; followed by gynecology    Uterine prolapse     Vitamin D deficiency disease        Past Surgical History:   Procedure Laterality Date    CATARACT EXTRACTION, BILATERAL      COSMETIC SURGERY         Social History     Social History    Marital status:      Spouse name: N/A    Number of children: 3    Years of education: N/A     Occupational History    teacher - retired      Social History Main Topics    Smoking status: Former Smoker     Packs/day: 0.50     Types: Cigarettes     Quit date: 5/22/1970    Smokeless tobacco: Never Used    Alcohol use 1.2 oz/week     2 Glasses of wine per week      Comment: once a week    Drug use: No    Sexual activity: No     Other Topics Concern    None     Social History  "Narrative    None       Family History   Problem Relation Age of Onset    Lung cancer Sister      smoker    Coronary artery disease Father     Stroke Mother     Fibromyalgia Sister     Arthritis Sister      back and knee     Breast cancer Neg Hx     Ovarian cancer Neg Hx     Diabetes Neg Hx     Colon cancer Neg Hx        Review of Systems  Review of Systems   Constitutional: Negative for fever.   Cardiovascular: Negative for claudication and leg swelling.   Musculoskeletal: Positive for joint pain. Negative for falls.   Skin: Negative for itching and rash.     A complete review of systems was otherwise negative.    Physical Exam  /70 (BP Location: Right arm, Patient Position: Sitting, BP Method: Medium (Manual))   Pulse 87   Temp 98 °F (36.7 °C) (Oral)   Ht 5' 3" (1.6 m)   Wt 54.9 kg (121 lb)   SpO2 (!) 94%   BMI 21.43 kg/m²   General appearance: alert, appears stated age, cooperative and no distress  Lungs: clear to auscultation bilaterally  Heart: regular rate and rhythm, S1, S2 normal, no murmur, click, rub or gallop  Extremities: extremities normal, atraumatic, no cyanosis or edema  Pulses: 2+ and symmetric  Skin: Skin color, texture, turgor normal. No rashes or lesions  Neurologic: Grossly normal  Musculoskeletal: Left knee exhibits no TTP, swelling, erythema, warmth, or obvious deformity. There is full ROM with no evidence for instability. There is joint crepitus with palpation. Full ROM to left hip and left ankle.    Assessment/Plan  Primary osteoarthritis of left knee  We will do short term of oral diclofenac. Would not use longterm.  X-ray for further evaluation.  Referral placed to orthopedics for further long term management.  -     diclofenac (VOLTAREN) 25 MG TbEC; Take 1 tablet (25 mg total) by mouth 2 (two) times daily.  Dispense: 60 tablet; Refill: 0  -     X-Ray Knee 3 View Left; Future; Expected date: 04/09/2018  -     Ambulatory referral to Orthopedics    Osteoporosis, " post-menopausal  The current medical regimen is effective;  continue present plan and medications.  Refill today.  -     alendronate (FOSAMAX) 70 MG tablet; Take 1 tablet (70 mg total) by mouth every 7 days.  Dispense: 12 tablet; Refill: 3  -     Ambulatory referral to Orthopedics    She has verbalized understanding and is in agreement with plan of care.    Follow-up if symptoms worsen or fail to improve, for as scheduled for follow-up with PCP.

## 2018-04-09 NOTE — TELEPHONE ENCOUNTER
Please let the patient know that her x-ray does show degeneration of the knee. Follow-up with orthopedics when they call to schedule.    Thanks!  DELMY WhitesideC

## 2018-04-10 ENCOUNTER — TELEPHONE (OUTPATIENT)
Dept: FAMILY MEDICINE | Facility: CLINIC | Age: 83
End: 2018-04-10

## 2018-04-10 NOTE — TELEPHONE ENCOUNTER
----- Message from Sayda Cadena sent at 4/10/2018 10:02 AM CDT -----  Contact: Self/ [phone]  Patient was returning a phone call for results. Thank you!

## 2018-04-18 ENCOUNTER — TELEPHONE (OUTPATIENT)
Dept: FAMILY MEDICINE | Facility: CLINIC | Age: 83
End: 2018-04-18

## 2018-04-18 NOTE — TELEPHONE ENCOUNTER
----- Message from Van Rios sent at 4/18/2018  9:48 AM CDT -----  [phone]  Pt is requesting all of her medical records to be fax[facility]. Please call at your earliest convenience.

## 2018-05-07 ENCOUNTER — OFFICE VISIT (OUTPATIENT)
Dept: FAMILY MEDICINE | Facility: CLINIC | Age: 83
End: 2018-05-07
Payer: MEDICARE

## 2018-05-07 ENCOUNTER — LAB VISIT (OUTPATIENT)
Dept: LAB | Facility: HOSPITAL | Age: 83
End: 2018-05-07
Attending: INTERNAL MEDICINE
Payer: MEDICARE

## 2018-05-07 VITALS
WEIGHT: 114.75 LBS | HEART RATE: 86 BPM | BODY MASS INDEX: 20.33 KG/M2 | DIASTOLIC BLOOD PRESSURE: 80 MMHG | TEMPERATURE: 98 F | SYSTOLIC BLOOD PRESSURE: 130 MMHG | HEIGHT: 63 IN | OXYGEN SATURATION: 98 %

## 2018-05-07 DIAGNOSIS — N30.00 ACUTE CYSTITIS WITHOUT HEMATURIA: ICD-10-CM

## 2018-05-07 DIAGNOSIS — N30.00 ACUTE CYSTITIS WITHOUT HEMATURIA: Primary | ICD-10-CM

## 2018-05-07 LAB
BILIRUB SERPL-MCNC: NEGATIVE MG/DL
BLOOD URINE, POC: NEGATIVE
COLOR, POC UA: YELLOW
GLUCOSE UR QL STRIP: NEGATIVE
KETONES UR QL STRIP: NEGATIVE
LEUKOCYTE ESTERASE URINE, POC: POSITIVE
NITRITE, POC UA: NEGATIVE
PH, POC UA: 6
PROTEIN, POC: NEGATIVE
SPECIFIC GRAVITY, POC UA: 1.01
UROBILINOGEN, POC UA: NEGATIVE

## 2018-05-07 PROCEDURE — 81002 URINALYSIS NONAUTO W/O SCOPE: CPT | Mod: S$GLB,,, | Performed by: INTERNAL MEDICINE

## 2018-05-07 PROCEDURE — 87086 URINE CULTURE/COLONY COUNT: CPT

## 2018-05-07 PROCEDURE — 99214 OFFICE O/P EST MOD 30 MIN: CPT | Mod: 25,S$GLB,, | Performed by: INTERNAL MEDICINE

## 2018-05-07 PROCEDURE — 99999 PR PBB SHADOW E&M-EST. PATIENT-LVL III: CPT | Mod: PBBFAC,,, | Performed by: INTERNAL MEDICINE

## 2018-05-07 RX ORDER — NITROFURANTOIN 25; 75 MG/1; MG/1
100 CAPSULE ORAL 2 TIMES DAILY
Qty: 10 CAPSULE | Refills: 0 | Status: SHIPPED | OUTPATIENT
Start: 2018-05-07 | End: 2018-05-12

## 2018-05-07 NOTE — PROGRESS NOTES
This note was created by combination of typed  and Dragon dictation.  Transcription errors may be present.  If there are any questions, please contact me.    Assessment / Plan:   Acute cystitis without hematuria-empiric treatment with Macrobid, check urine culture.  Last creatinine estimated creatinine clearance greater than 60  -     POCT URINE DIPSTICK WITHOUT MICROSCOPE  -     Urine culture; Future; Expected date: 05/07/2018  -     nitrofurantoin, macrocrystal-monohydrate, (MACROBID) 100 MG capsule; Take 1 capsule (100 mg total) by mouth 2 (two) times daily.  Dispense: 10 capsule; Refill: 0      There are no discontinued medications.  Modified Medications    No medications on file     New Prescriptions    NITROFURANTOIN, MACROCRYSTAL-MONOHYDRATE, (MACROBID) 100 MG CAPSULE    Take 1 capsule (100 mg total) by mouth 2 (two) times daily.         Follow Up: No Follow-up on file.      Subjective:     Chief Complaint   Patient presents with    Urinary Tract Infection       HPI  Emeka is a 85 y.o. female, last appointment with this clinic was 4/9/2018.    No LMP recorded. Patient is postmenopausal.    Hx of UTI that improved with bladder prolapse surgery.  But over the weekend started getting burning with urination.  Not really a pain. No excess of frequency. Volume is the same.  No abn color or odor.  No vaginal pain or discharge. No fever. No abd pain.     11/2016 UCx with E coli resistant to bactrim, LQ; was sensitive to amp/sulbactam, intermediate to augmentin    Patient Care Team:  Moon Mccallum MD as PCP - General (Internal Medicine)    Patient Active Problem List    Diagnosis Date Noted    Hyperlipidemia 03/10/2017    Recurrent UTI 01/16/2017    Coronary artery disease involving native coronary artery of native heart without angina pectoris 11/30/2016    Atherosclerosis of aortic arch      - noted on CXR 11/2016      Osteoporosis, post-menopausal     Urge and stress incontinence      uses  pessary; followed by gynecology      Vitamin D deficiency disease        PAST MEDICAL HISTORY:  Past Medical History:   Diagnosis Date    Abnormal exam or test finding 12/2011    coronary calcium score 204 = moderate plaque burden and high coronary heart disease risk    Arthritis     Atherosclerosis of aortic arch     - noted on CXR 11/2016    Borderline hyperlipidemia     Coronary artery disease involving native coronary artery of native heart without angina pectoris 11/30/2016    Osteoporosis, post-menopausal     Urge and stress incontinence     uses pessary; followed by gynecology    Uterine prolapse     Vitamin D deficiency disease        PAST SURGICAL HISTORY:  Past Surgical History:   Procedure Laterality Date    CATARACT EXTRACTION, BILATERAL      COSMETIC SURGERY         SOCIAL HISTORY:  Social History     Social History    Marital status:      Spouse name: N/A    Number of children: 3    Years of education: N/A     Occupational History    teacher - retired      Social History Main Topics    Smoking status: Former Smoker     Packs/day: 0.50     Types: Cigarettes     Quit date: 5/22/1970    Smokeless tobacco: Never Used    Alcohol use 1.2 oz/week     2 Glasses of wine per week      Comment: once a week    Drug use: No    Sexual activity: No     Other Topics Concern    Not on file     Social History Narrative    No narrative on file       ALLERGIES AND MEDICATIONS: updated and reviewed.  Review of patient's allergies indicates:  No Known Allergies  Current Outpatient Prescriptions   Medication Sig Dispense Refill    alendronate (FOSAMAX) 70 MG tablet Take 1 tablet (70 mg total) by mouth every 7 days. 12 tablet 3    aspirin (ECOTRIN) 81 MG EC tablet Take 1 tablet (81 mg total) by mouth once daily. 90 tablet 3    diclofenac (VOLTAREN) 25 MG TbEC Take 1 tablet (25 mg total) by mouth 2 (two) times daily. 60 tablet 0    ergocalciferol (VITAMIN D2) 50,000 unit Cap Take 1 capsule  "(50,000 Units total) by mouth every 7 days. 4 capsule 11    multivitamin (ONE DAILY MULTIVITAMIN) per tablet Take 1 tablet by mouth once daily.       No current facility-administered medications for this visit.        Review of Systems   Constitutional: Negative for chills and fever.   Genitourinary: Negative for frequency, hematuria and urgency.   All other systems reviewed and are negative.      Objective:   Physical Exam   Vitals:    05/07/18 1124   BP: 130/80   Pulse: 86   Temp: 97.9 °F (36.6 °C)   SpO2: 98%   Weight: 52 kg (114 lb 12 oz)   Height: 5' 2.5" (1.588 m)    Body mass index is 20.65 kg/m².  Weight: 52 kg (114 lb 12 oz)   Height: 5' 2.5" (158.8 cm)     Physical Exam   Constitutional: She is oriented to person, place, and time. She appears well-developed and well-nourished. No distress.   Eyes: EOM are normal.   Cardiovascular: Normal rate, regular rhythm and normal heart sounds.    No murmur heard.  Abdominal: Soft. She exhibits no distension and no mass. There is no tenderness. There is no guarding.   Musculoskeletal: Normal range of motion.   Neurological: She is alert and oriented to person, place, and time. Coordination normal.   Skin: Skin is warm and dry.   Psychiatric: She has a normal mood and affect. Her behavior is normal. Thought content normal.     POCT with WBC, LE positive nitrite negative  "

## 2018-05-08 LAB — BACTERIA UR CULT: NORMAL

## 2018-05-09 ENCOUNTER — TELEPHONE (OUTPATIENT)
Dept: FAMILY MEDICINE | Facility: CLINIC | Age: 83
End: 2018-05-09

## 2018-05-09 NOTE — PROGRESS NOTES
Urine culture negative.    Please call pt - her urine culture did not grow any bacteria.  She can keep taking the macrobid antibiotic and finish it out.  Hopefully she's feeling better.

## 2018-05-09 NOTE — TELEPHONE ENCOUNTER
----- Message from Will Marques MD sent at 5/9/2018  7:35 AM CDT -----  Urine culture negative.    Please call pt - her urine culture did not grow any bacteria.  She can keep taking the macrobid antibiotic and finish it out.  Hopefully she's feeling better.

## 2018-06-06 ENCOUNTER — OFFICE VISIT (OUTPATIENT)
Dept: FAMILY MEDICINE | Facility: CLINIC | Age: 83
End: 2018-06-06
Payer: MEDICARE

## 2018-06-06 VITALS
TEMPERATURE: 98 F | BODY MASS INDEX: 19.31 KG/M2 | SYSTOLIC BLOOD PRESSURE: 140 MMHG | HEART RATE: 96 BPM | HEIGHT: 63 IN | DIASTOLIC BLOOD PRESSURE: 58 MMHG | OXYGEN SATURATION: 97 % | WEIGHT: 109 LBS

## 2018-06-06 DIAGNOSIS — M81.0 OSTEOPOROSIS, POST-MENOPAUSAL: ICD-10-CM

## 2018-06-06 DIAGNOSIS — I70.0 ATHEROSCLEROSIS OF AORTIC ARCH: ICD-10-CM

## 2018-06-06 DIAGNOSIS — Z00.00 ROUTINE MEDICAL EXAM: Primary | ICD-10-CM

## 2018-06-06 DIAGNOSIS — M79.605 LEFT LEG PAIN: ICD-10-CM

## 2018-06-06 DIAGNOSIS — Z71.89 ADVANCED DIRECTIVES, COUNSELING/DISCUSSION: ICD-10-CM

## 2018-06-06 DIAGNOSIS — E78.5 HYPERLIPIDEMIA, UNSPECIFIED HYPERLIPIDEMIA TYPE: ICD-10-CM

## 2018-06-06 DIAGNOSIS — E55.9 VITAMIN D DEFICIENCY DISEASE: ICD-10-CM

## 2018-06-06 DIAGNOSIS — Z23 NEED FOR STREPTOCOCCUS PNEUMONIAE VACCINATION: ICD-10-CM

## 2018-06-06 PROCEDURE — 99397 PER PM REEVAL EST PAT 65+ YR: CPT | Mod: S$GLB,,, | Performed by: INTERNAL MEDICINE

## 2018-06-06 PROCEDURE — G0009 ADMIN PNEUMOCOCCAL VACCINE: HCPCS | Mod: S$GLB,,, | Performed by: INTERNAL MEDICINE

## 2018-06-06 PROCEDURE — 99999 PR PBB SHADOW E&M-EST. PATIENT-LVL III: CPT | Mod: PBBFAC,,, | Performed by: INTERNAL MEDICINE

## 2018-06-06 PROCEDURE — 99499 UNLISTED E&M SERVICE: CPT | Mod: S$PBB,,, | Performed by: INTERNAL MEDICINE

## 2018-06-06 PROCEDURE — 90732 PPSV23 VACC 2 YRS+ SUBQ/IM: CPT | Mod: S$GLB,,, | Performed by: INTERNAL MEDICINE

## 2018-06-06 RX ORDER — ALENDRONATE SODIUM 70 MG/1
70 TABLET ORAL
Qty: 12 TABLET | Refills: 3 | Status: SHIPPED | OUTPATIENT
Start: 2018-06-06 | End: 2019-02-18

## 2018-06-06 NOTE — PROGRESS NOTES
HISTORY OF PRESENT ILLNESS:  Lang Kumar is a 85 y.o. female who presents to the clinic today for a routine medical physical exam. Her last physical exam was approximately 1 years(s) ago.        PAST MEDICAL HISTORY:  Past Medical History:   Diagnosis Date    Abnormal exam or test finding 12/2011    coronary calcium score 204 = moderate plaque burden and high coronary heart disease risk    Arthritis     Atherosclerosis of aortic arch     - noted on CXR 11/2016    Borderline hyperlipidemia     Coronary artery disease involving native coronary artery of native heart without angina pectoris 11/30/2016    Osteoporosis, post-menopausal     Urge and stress incontinence     uses pessary; followed by gynecology    Uterine prolapse     Vitamin D deficiency disease        PAST SURGICAL HISTORY:  Past Surgical History:   Procedure Laterality Date    CATARACT EXTRACTION, BILATERAL      COSMETIC SURGERY         SOCIAL HISTORY:  Social History     Social History    Marital status:      Spouse name: N/A    Number of children: 3    Years of education: N/A     Occupational History    teacher - retired      Social History Main Topics    Smoking status: Former Smoker     Packs/day: 0.50     Types: Cigarettes     Quit date: 5/22/1970    Smokeless tobacco: Never Used    Alcohol use 1.2 oz/week     2 Glasses of wine per week      Comment: once a week    Drug use: No    Sexual activity: No     Other Topics Concern    Not on file     Social History Narrative    No narrative on file       FAMILY HISTORY:  Family History   Problem Relation Age of Onset    Lung cancer Sister         smoker    Coronary artery disease Father     Stroke Mother     Fibromyalgia Sister     Arthritis Sister         back and knee     Breast cancer Neg Hx     Ovarian cancer Neg Hx     Diabetes Neg Hx     Colon cancer Neg Hx        ALLERGIES AND MEDICATIONS: updated and reviewed.  Review of patient's allergies indicates:  No  Known Allergies  Medication List with Changes/Refills   Current Medications    ASPIRIN (ECOTRIN) 81 MG EC TABLET    Take 1 tablet (81 mg total) by mouth once daily.    DICLOFENAC (VOLTAREN) 25 MG TBEC    Take 1 tablet (25 mg total) by mouth 2 (two) times daily.    ERGOCALCIFEROL (VITAMIN D2) 50,000 UNIT CAP    Take 1 capsule (50,000 Units total) by mouth every 7 days.    MULTIVITAMIN (ONE DAILY MULTIVITAMIN) PER TABLET    Take 1 tablet by mouth once daily.   Changed and/or Refilled Medications    Modified Medication Previous Medication    ALENDRONATE (FOSAMAX) 70 MG TABLET alendronate (FOSAMAX) 70 MG tablet       Take 1 tablet (70 mg total) by mouth every 7 days.    Take 1 tablet (70 mg total) by mouth every 7 days.         CARE TEAM:  Patient Care Team:  Moon Mccallum MD as PCP - General (Internal Medicine)  Gardenia Sidhu MD as Obstetrician (Obstetrics)           SCREENING HISTORY:  Health Maintenance       Date Due Completion Date    TETANUS VACCINE 09/13/2015 9/13/2005    Pneumococcal (65+) (2 of 2 - PPSV23) 11/11/2017 11/11/2016    Influenza Vaccine 08/01/2018 11/8/2017    DEXA SCAN 11/21/2018 11/21/2016    Override on 12/5/2011: Done    Lipid Panel 08/30/2022 8/30/2017            REVIEW OF SYSTEMS:   The patient reports good dietary habits.  The patient does not exercise regularly.  Review of Systems   Constitutional: Negative for chills, fatigue, fever and unexpected weight change.   HENT: Negative for congestion, ear discharge, ear pain and postnasal drip.    Eyes: Negative for photophobia, pain and visual disturbance.   Respiratory: Negative for cough, shortness of breath and wheezing.    Cardiovascular: Negative for chest pain, palpitations and leg swelling.   Gastrointestinal: Negative for abdominal pain, constipation, diarrhea, nausea and vomiting.   Genitourinary: Negative for dysuria, frequency, urgency and vaginal discharge.   Musculoskeletal: Negative for back pain, joint swelling and neck  "stiffness.        - left leg feels tingling and feels like it is going to give out on her when she first starts to get up in the am; it is resolved with massaging; no problems the rest of the day   Skin: Negative for rash.   Neurological: Negative for weakness and headaches.   Psychiatric/Behavioral: Negative for dysphoric mood and sleep disturbance. The patient is not nervous/anxious.      Breast ROS: negative for breast lumps             Physical Examination:   Vitals:    06/06/18 1436   BP: (!) 140/58   Pulse: 96   Temp: 97.8 °F (36.6 °C)     Weight: 49.5 kg (109 lb 0.3 oz)   Height: 5' 2.5" (158.8 cm)   Body mass index is 19.62 kg/m².      Patient did not require to have a chaperone present during the exam today.  General appearance - alert, well appearing, and in no distress and normal appearing weight  Mental status - alert, oriented to person, place, and time, normal mood, behavior, speech, dress, motor activity, and thought processes  Eyes - pupils equal and reactive, extraocular eye movements intact  Mouth - mucous membranes moist, pharynx normal without lesions  Neck - supple, no significant adenopathy, carotids upstroke normal bilaterally, no bruits, thyroid exam: thyroid is normal in size without nodules or tenderness  Lymphatics - no palpable lymphadenopathy  Chest - clear to auscultation, no wheezes, rales or rhonchi, symmetric air entry  Heart - normal rate and regular rhythm, no gallops noted  Abdomen - soft, nontender, nondistended, no masses or organomegaly  Breasts - not examined  Back exam - full range of motion, no tenderness, palpable spasm or pain on motion, negative straight-leg raise bilaterally   Neurological - alert, oriented, normal speech, no focal findings or movement disorder noted, cranial nerves II through XII intact  Musculoskeletal - no muscular tenderness noted, Moderate osteoarthritic changes noted to both knee joints. No joint effusions noted.   Extremities - no pedal edema " noted, intact peripheral pulses  Skin - normal coloration and turgor, no rashes, no suspicious skin lesions noted      ASSESSMENT AND PLAN:  1. Routine medical exam  Counseled on age appropriate medical preventative services including age appropriate cancer screenings, age appropriate eye and dental exams, over all nutritional health, need for a consistent exercise regimen, and an over all push towards maintaining a vigorous and active lifestyle.  Counseled on age appropriate vaccines and discussed upcoming health care needs based on age/gender. Discussed good sleep hygiene and stress management.    2. Hyperlipidemia, unspecified hyperlipidemia type  We discussed low fat diet and regular exercise. No need for prescription medication at this time.     3. Osteoporosis, post-menopausal/4. Vitamin D deficiency disease  We discussed adequate calcium and vitamin D supplementation. We discussed fall precautions. She is up to date on her BMD. Continue fosamax.  - alendronate (FOSAMAX) 70 MG tablet; Take 1 tablet (70 mg total) by mouth every 7 days.  Dispense: 12 tablet; Refill: 3    5. Atherosclerosis of aortic arch  Patient with Atherosclerosis of the Aorta.  Stable/asymptomatic. Currently stable on lipid and b/p monitoring. Will avoid statin due to age.    6. Need for Streptococcus pneumoniae vaccination    - (In Office Administered) Pneumococcal Polysaccharide Vaccine (23 Valent) (SQ/IM)    7. Advanced directives, counseling/discussion  I had a discussion today with the patient regarding advanced directives.  Advanced directives were discussed due to patient's age and/or chronic medical conditions. Prognosis based on current condition: good. Paperwork was given to complete living will and medical POA. [place] was discussed and paperwork given to complete at next office visit. Approximately 10 minute(s) spent on counseling/discussion regarding end of life decision making.     8. Left leg pain  Patient completely normal  exam.  I think she probably has a pinched nerve that bothers her at bedtime.  She may have some spinal stenosis.  She denies any back pain.  Symptoms are improved just with massaging her leg when she wakes up in the morning.  She is not interested in further evaluation at this time.  She has seen Orthopedics.  She will let me know if she develops any pain or problems with ambulation at which time we can consider further evaluation/treatment.          Follow-up in about 6 months (around 12/6/2018), or if symptoms worsen or fail to improve, for follow up chronic medical conditions.. or sooner as needed.

## 2018-06-12 ENCOUNTER — PES CALL (OUTPATIENT)
Dept: ADMINISTRATIVE | Facility: CLINIC | Age: 83
End: 2018-06-12

## 2018-06-19 ENCOUNTER — TELEPHONE (OUTPATIENT)
Dept: FAMILY MEDICINE | Facility: CLINIC | Age: 83
End: 2018-06-19

## 2018-06-19 DIAGNOSIS — M79.606 PAIN OF LOWER EXTREMITY, UNSPECIFIED LATERALITY: Primary | ICD-10-CM

## 2018-06-19 NOTE — TELEPHONE ENCOUNTER
----- Message from Tiago Jung sent at 6/19/2018  2:36 PM CDT -----  Contact: self  Pt called in about wanting to rs appt.Pt still having left leg pain. Next appt is 9/2018. Pt would like to see if can get an earlier appt than that. Pt would like the nurse to give her a call back in regards to this matter      Pt can be reached at [phone]      TY

## 2018-06-19 NOTE — TELEPHONE ENCOUNTER
As mentioned at recent office visit - I think she has a pinched nerve in her leg causing her leg pain. I recommend she see Dr. Witt for further evaluation - does she want me to schedule an appointment for her?  She just saw me 6/6 for an annual exam. She is not due to see me for routine follow up until December.

## 2018-06-19 NOTE — TELEPHONE ENCOUNTER
Spoke with the pt, scheduled a follow up, for leg pain.  Scheduled an annual appt and fasting labs.  Mailed appt reminders.  Pended fasting labs.  Patient verbalized understandings.

## 2018-06-19 NOTE — TELEPHONE ENCOUNTER
As mentioned at recent office visit - I think she has a pinched nerve in her leg causing her leg pain. I recommend she see Dr. Witt for further evaluation - does she want me to schedule an appointment for her?  She just saw me 6/6 for an annual exam. She is not due to see me for routine follow up until December.  Spoke with the pt, cancelled the previous appointments.  Yes, the patient would like to see .  Please schedule for the patient.  Patient verbalized understandings.

## 2018-06-20 ENCOUNTER — OFFICE VISIT (OUTPATIENT)
Dept: FAMILY MEDICINE | Facility: CLINIC | Age: 83
End: 2018-06-20
Payer: MEDICARE

## 2018-06-20 VITALS
SYSTOLIC BLOOD PRESSURE: 102 MMHG | TEMPERATURE: 98 F | DIASTOLIC BLOOD PRESSURE: 62 MMHG | HEIGHT: 63 IN | OXYGEN SATURATION: 96 % | WEIGHT: 112.19 LBS | BODY MASS INDEX: 19.88 KG/M2 | HEART RATE: 75 BPM

## 2018-06-20 DIAGNOSIS — M54.32 SCIATICA OF LEFT SIDE: ICD-10-CM

## 2018-06-20 DIAGNOSIS — M79.605 LEG PAIN, LEFT: ICD-10-CM

## 2018-06-20 DIAGNOSIS — R68.89 FORGETFULNESS: ICD-10-CM

## 2018-06-20 DIAGNOSIS — R41.3 MEMORY LOSS: Primary | ICD-10-CM

## 2018-06-20 PROCEDURE — 99214 OFFICE O/P EST MOD 30 MIN: CPT | Mod: S$GLB,,, | Performed by: NURSE PRACTITIONER

## 2018-06-20 PROCEDURE — 99999 PR PBB SHADOW E&M-EST. PATIENT-LVL III: CPT | Mod: PBBFAC,,, | Performed by: NURSE PRACTITIONER

## 2018-06-20 NOTE — PROGRESS NOTES
Subjective:       Patient ID: Lang Kumar is a 85 y.o. female.    Chief Complaint: Leg Pain (left leg pain follow up )    86 yo female presents with her two daughters following up on leg pain. One daughter states she made an appt with Dr. Witt for pain leg because she is complaining of daily pain. Patient reports some pain. Patient has seen orthopedics in the past, but thinks this is related to a nerve. The daughter also reports outside of the room that patient has been forgetful and has memory loss. She still drives, but daughter think she need to see Neurology with regards to this. In the room, patient reports she forgets sometimes where she place things, but attributes this to age. She reports knowing her children name and still driving. The patient has no other complaints at this time.         Past Medical History:   Diagnosis Date    Abnormal exam or test finding 12/2011    coronary calcium score 204 = moderate plaque burden and high coronary heart disease risk    Arthritis     Atherosclerosis of aortic arch     - noted on CXR 11/2016    Borderline hyperlipidemia     Coronary artery disease involving native coronary artery of native heart without angina pectoris 11/30/2016    Osteoporosis, post-menopausal     Urge and stress incontinence     uses pessary; followed by gynecology    Uterine prolapse     Vitamin D deficiency disease        Social History     Social History    Marital status:      Spouse name: N/A    Number of children: 3    Years of education: N/A     Occupational History    teacher - retired      Social History Main Topics    Smoking status: Former Smoker     Packs/day: 0.50     Types: Cigarettes     Quit date: 5/22/1970    Smokeless tobacco: Never Used    Alcohol use 1.2 oz/week     2 Glasses of wine per week      Comment: once a week    Drug use: No    Sexual activity: No     Other Topics Concern    Not on file     Social History Narrative    No narrative on  "file       Past Surgical History:   Procedure Laterality Date    CATARACT EXTRACTION, BILATERAL      COSMETIC SURGERY         Review of Systems   Musculoskeletal: Negative for gait problem and joint swelling.        Leg pain   Skin: Negative for color change.   Psychiatric/Behavioral: Negative for agitation, behavioral problems, confusion, self-injury and sleep disturbance. The patient is not nervous/anxious.         Memory loss per daughter   All other systems reviewed and are negative.      Objective:   /62 (BP Location: Left arm, Patient Position: Sitting, BP Method: Small (Manual))   Pulse 75   Temp 97.7 °F (36.5 °C) (Oral)   Ht 5' 2.5" (1.588 m)   Wt 50.9 kg (112 lb 3.4 oz)   SpO2 96%   BMI 20.20 kg/m²      Physical Exam   Constitutional: She appears well-developed and well-nourished.   HENT:   Head: Normocephalic and atraumatic.   Cardiovascular: Normal rate, regular rhythm, normal heart sounds and normal pulses.    Pulmonary/Chest: Effort normal. No respiratory distress. She has no decreased breath sounds. She has no wheezes. She has no rhonchi. She has no rales.   Musculoskeletal:        Arms:  Neurological: She has normal strength.   Skin: Skin is intact. She is not diaphoretic. No pallor.   Psychiatric: She has a normal mood and affect. Her speech is normal and behavior is normal. Judgment and thought content normal. Cognition and memory are normal.       Assessment:       1. Memory loss    2. Leg pain, left    3. Sciatica of left side    4. Forgetfulness        Plan:       Lang was seen today for leg pain.    Diagnoses and all orders for this visit:    Memory loss  -     Ambulatory referral to Neurology    Leg pain, left    Sciatica of left side    Forgetfulness  -     Ambulatory referral to Neurology      They are to keep appt with Pain management. Will refer to Neurology per daughters request. Involvement of care signed in clinic today. Advance directives and CHANCE signed in clinic " today.    Follow-up if symptoms worsen or fail to improve.

## 2018-06-29 ENCOUNTER — TELEPHONE (OUTPATIENT)
Dept: PAIN MEDICINE | Facility: CLINIC | Age: 83
End: 2018-06-29

## 2018-06-29 NOTE — TELEPHONE ENCOUNTER
Message left reminding patient of Pain Management appointment scheduled for Monday at 1.15p with Dr. Witt.  Location information also included.

## 2018-07-02 ENCOUNTER — OFFICE VISIT (OUTPATIENT)
Dept: PAIN MEDICINE | Facility: CLINIC | Age: 83
End: 2018-07-02
Payer: MEDICARE

## 2018-07-02 ENCOUNTER — APPOINTMENT (OUTPATIENT)
Dept: RADIOLOGY | Facility: HOSPITAL | Age: 83
End: 2018-07-02
Attending: PAIN MEDICINE
Payer: MEDICARE

## 2018-07-02 VITALS
HEIGHT: 62 IN | WEIGHT: 114.19 LBS | SYSTOLIC BLOOD PRESSURE: 134 MMHG | RESPIRATION RATE: 18 BRPM | HEART RATE: 85 BPM | DIASTOLIC BLOOD PRESSURE: 69 MMHG | OXYGEN SATURATION: 96 % | BODY MASS INDEX: 21.02 KG/M2

## 2018-07-02 DIAGNOSIS — M47.816 LUMBAR SPONDYLOSIS: ICD-10-CM

## 2018-07-02 DIAGNOSIS — R29.898 WEAKNESS OF LEFT LOWER EXTREMITY: ICD-10-CM

## 2018-07-02 DIAGNOSIS — M54.16 LUMBAR RADICULOPATHY: ICD-10-CM

## 2018-07-02 DIAGNOSIS — M51.36 DDD (DEGENERATIVE DISC DISEASE), LUMBAR: Primary | ICD-10-CM

## 2018-07-02 DIAGNOSIS — M51.36 DDD (DEGENERATIVE DISC DISEASE), LUMBAR: ICD-10-CM

## 2018-07-02 PROCEDURE — 99204 OFFICE O/P NEW MOD 45 MIN: CPT | Mod: S$GLB,,, | Performed by: PAIN MEDICINE

## 2018-07-02 PROCEDURE — 72100 X-RAY EXAM L-S SPINE 2/3 VWS: CPT | Mod: 26,,, | Performed by: RADIOLOGY

## 2018-07-02 PROCEDURE — 72100 X-RAY EXAM L-S SPINE 2/3 VWS: CPT | Mod: TC,FY,PN

## 2018-07-02 PROCEDURE — 99999 PR PBB SHADOW E&M-EST. PATIENT-LVL IV: CPT | Mod: PBBFAC,,, | Performed by: PAIN MEDICINE

## 2018-07-02 NOTE — LETTER
July 3, 2018      Moon Mccallum MD  [address]  [place] [address]           [facility]  [address] [facility] [facility]  [place] [address]  Phone: [phone]  Fax: [phone]          Patient: Lang Kumar   MR Number: [id number]   YOB: 1932   Date of Visit: 7/2/2018       Dear Dr. Moon Mccallum:    Thank you for referring Lang Kumar to me for evaluation. Attached you will find relevant portions of my assessment and plan of care.    If you have questions, please do not hesitate to call me. I look forward to following Lang Kumar along with you.    Sincerely,    Erick Witt Jr., MD    Enclosure  CC:  No Recipients    If you would like to receive this communication electronically, please contact [email or web address] or [phone] to request more information on [employer] Link access.    For providers and/or their staff who would like to refer a patient to [facility], please contact us through our one-stop-shop provider referral line, [facility], at [phone].    If you feel you have received this communication in error or would no longer like to receive these types of communications, please e-mail [email or web address]

## 2018-07-02 NOTE — PROGRESS NOTES
"Subjective:     Patient ID: aLng Kumar is a 85 y.o. female    Chief Complaint: Leg Pain (Patient experiences pain in lower extremity- patient experiences pins and needles sensation also numbness in L leg- treatment w/ home exercising and medication )      Referred by: Moon Mccallum MD      HPI:    Initial Encounter (7/2/18):  Lang Kumar is a 85 y.o. female who presents today with chronic left lower extremity pain. This pain has been present for about 6 months. No specific inciting event or injury noted. The pain is located in the entire left lower extremity. She describes the pain as sharp and "pins and needles". She denies any numbness, tingling, focal weakness or b/b dysfunction. She denies any right lower extremity or low back pain. She does reports that her left lower extremity feels like it might give out at times. Her pain is worst in the mornings and evening and after prolonged rest. It is not as bad after she has been walking. She states that she is afraid of falling and as a result does not walk as much. She massages her leg and this improves her pain. This pain is described in detail below.    Physical Therapy: No    Non-pharmacologic Treatment: Massage helps         · TENS? No    Pain Medications:         · Currently taking: Nothing    · Has tried in the past:      · Has not tried: Opioids, NSAIDs, Tylenol, Muscle relaxants, TCAs, SNRIs, anticonvulsants, topical creams    Blood thinners: ASA 81mg daily    Interventional Therapies: None    Relevant Surgeries: None    Affecting sleep? Yes    Affecting daily activities? yes    Depressive symptoms? yes          · SI/HI? No    Work status: Unemployed    Pain Scores:    Best:       5/10  Worst:     5/10  Usually:   5/10  Today:    6/10    Review of Systems   Constitutional: Negative for activity change, appetite change, chills, fatigue, fever and unexpected weight change.   HENT: Negative for hearing loss.    Eyes: Negative for visual " disturbance.   Respiratory: Negative for chest tightness and shortness of breath.    Cardiovascular: Negative for chest pain.   Gastrointestinal: Negative for abdominal pain, constipation, diarrhea, nausea and vomiting.   Genitourinary: Negative for difficulty urinating.   Musculoskeletal: Positive for gait problem and myalgias. Negative for back pain and neck pain.   Skin: Negative for rash.   Neurological: Negative for dizziness, weakness, light-headedness, numbness and headaches.   Psychiatric/Behavioral: Positive for sleep disturbance. Negative for hallucinations and suicidal ideas. The patient is not nervous/anxious.        Past Medical History:   Diagnosis Date    Abnormal exam or test finding 12/2011    coronary calcium score 204 = moderate plaque burden and high coronary heart disease risk    Arthritis     Atherosclerosis of aortic arch     - noted on CXR 11/2016    Borderline hyperlipidemia     Coronary artery disease involving native coronary artery of native heart without angina pectoris 11/30/2016    Osteoporosis, post-menopausal     Urge and stress incontinence     uses pessary; followed by gynecology    Uterine prolapse     Vitamin D deficiency disease        Past Surgical History:   Procedure Laterality Date    CATARACT EXTRACTION, BILATERAL      COSMETIC SURGERY         Social History     Social History    Marital status:      Spouse name: N/A    Number of children: 3    Years of education: N/A     Occupational History    teacher - retired      Social History Main Topics    Smoking status: Former Smoker     Packs/day: 0.50     Types: Cigarettes     Quit date: 5/22/1970    Smokeless tobacco: Never Used    Alcohol use 1.2 oz/week     2 Glasses of wine per week      Comment: once a week    Drug use: No    Sexual activity: No     Other Topics Concern    Not on file     Social History Narrative    No narrative on file       Review of patient's allergies indicates:  No Known  "Allergies    Current Outpatient Prescriptions on File Prior to Visit   Medication Sig Dispense Refill    alendronate (FOSAMAX) 70 MG tablet Take 1 tablet (70 mg total) by mouth every 7 days. 12 tablet 3    aspirin (ECOTRIN) 81 MG EC tablet Take 1 tablet (81 mg total) by mouth once daily. 90 tablet 3    ergocalciferol (VITAMIN D2) 50,000 unit Cap Take 1 capsule (50,000 Units total) by mouth every 7 days. 4 capsule 11    multivitamin (ONE DAILY MULTIVITAMIN) per tablet Take 1 tablet by mouth once daily.       No current facility-administered medications on file prior to visit.        Objective:      /69   Pulse 85   Resp 18   Ht 5' 2" (1.575 m)   Wt 51.8 kg (114 lb 3.2 oz)   SpO2 96%   BMI 20.89 kg/m²     Exam:  GEN:  Well developed, well nourished.  No acute distress. Normal pain behavior.  HEENT:  No trauma.  Mucous membranes moist.  Nares patent bilaterally.  PSYCH: Normal affect. Thought content appropriate.  CHEST:  Breathing symmetric.  No audible wheezing.  ABD: Soft, non-distended.  SKIN:  Warm, pink, dry.  No rash on exposed areas.    EXT:  No cyanosis, clubbing, or edema.  No color change or changes in nail or hair growth.  NEURO/MUSCULOSKELETAL:  Fully alert, oriented, and appropriate. Speech normal jenny. No cranial nerve deficits.   Gait: Antalgic.  No trendelenburg sign bilaterally.   Motor Strength: 5/5 motor strength throughout lower extremities.   Sensory: No sensory deficit in the lower extremities.   Reflexes:  2+ and symmetric throughout.  Downgoing Babinski's bilaterally.  No clonus or spasticity.          Imaging:  No pertinent imaging available for review at this time.    Assessment:       Encounter Diagnoses   Name Primary?    DDD (degenerative disc disease), lumbar Yes    Lumbar spondylosis     Lumbar radiculopathy     Weakness of left lower extremity          Plan:       Lang was seen today for leg pain.    Diagnoses and all orders for this visit:    DDD (degenerative " disc disease), lumbar  -     Cancel: Ambulatory Referral to Physical/Occupational Therapy  -     X-Ray Lumbar Spine AP And Lateral; Future  -     Ambulatory Referral to Physical/Occupational Therapy    Lumbar spondylosis  -     Cancel: Ambulatory Referral to Physical/Occupational Therapy  -     X-Ray Lumbar Spine AP And Lateral; Future  -     Ambulatory Referral to Physical/Occupational Therapy    Lumbar radiculopathy  -     Cancel: Ambulatory Referral to Physical/Occupational Therapy  -     X-Ray Lumbar Spine AP And Lateral; Future  -     Ambulatory Referral to Physical/Occupational Therapy    Weakness of left lower extremity  -     Cancel: Ambulatory Referral to Physical/Occupational Therapy  -     X-Ray Lumbar Spine AP And Lateral; Future  -     Ambulatory Referral to Physical/Occupational Therapy        Lang Kumar is a 85 y.o. female with chronic left lower extremity pain. Etiology not exactly clear at this time. May be radicular. Patient not interested in surgery or procedures at this time.     1. Lumbar xrays today.  2. Refer to PT for ROM, strengthening, stretching, gait training and HEP. Also she should be evaluated for any appropriate assistive devices.   3. We discussed that her risk of falling is significant. We discussed that the complications from a fall are very serious and that there is a high mortality rate following falls in people her age. She expressed understanding.   4. RTC in 4 weeks. At that time we will discuss efficacy of PT. We may consider medication options.

## 2018-08-13 ENCOUNTER — HOSPITAL ENCOUNTER (EMERGENCY)
Facility: HOSPITAL | Age: 83
Discharge: HOME | End: 2018-08-13
Attending: EMERGENCY MEDICINE
Payer: MEDICARE

## 2018-08-13 VITALS
HEART RATE: 72 BPM | HEIGHT: 61 IN | TEMPERATURE: 98 F | DIASTOLIC BLOOD PRESSURE: 63 MMHG | BODY MASS INDEX: 21.9 KG/M2 | OXYGEN SATURATION: 98 % | SYSTOLIC BLOOD PRESSURE: 130 MMHG | RESPIRATION RATE: 18 BRPM | WEIGHT: 116 LBS

## 2018-08-13 DIAGNOSIS — S42.412A CLOSED FRACTURE OF SUPRACONDYLAR HUMERUS, LEFT, INITIAL ENCOUNTER: Primary | ICD-10-CM

## 2018-08-13 DIAGNOSIS — M25.539 WRIST PAIN: ICD-10-CM

## 2018-08-13 DIAGNOSIS — S42.422A CLOSED DISPLACED COMMINUTED SUPRACONDYLAR FRACTURE OF LEFT HUMERUS WITHOUT INTERCONDYLAR FRACTURE, INITIAL ENCOUNTER: Primary | ICD-10-CM
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
## 2018-08-13 LAB
ALBUMIN SERPL BCP-MCNC: 3.6 G/DL
ALP SERPL-CCNC: 52 U/L
ALT SERPL W/O P-5'-P-CCNC: 13 U/L
ANION GAP SERPL CALC-SCNC: 10 MMOL/L
AST SERPL-CCNC: 15 U/L
BASOPHILS # BLD AUTO: 0.04 K/UL
BASOPHILS NFR BLD: 0.3 %
BILIRUB SERPL-MCNC: 0.6 MG/DL
BILIRUB UR QL STRIP: NEGATIVE
BUN SERPL-MCNC: 23 MG/DL
CALCIUM SERPL-MCNC: 9.1 MG/DL
CHLORIDE SERPL-SCNC: 104 MMOL/L
CLARITY UR REFRACT.AUTO: ABNORMAL
CO2 SERPL-SCNC: 22 MMOL/L
COLOR UR AUTO: YELLOW
CREAT SERPL-MCNC: 0.7 MG/DL
DIFFERENTIAL METHOD: ABNORMAL
EOSINOPHIL # BLD AUTO: 0 K/UL
EOSINOPHIL NFR BLD: 0.3 %
ERYTHROCYTE [DISTWIDTH] IN BLOOD BY AUTOMATED COUNT: 12.5 %
EST. GFR  (AFRICAN AMERICAN): >60 ML/MIN/1.73 M^2
EST. GFR  (NON AFRICAN AMERICAN): >60 ML/MIN/1.73 M^2
GLUCOSE SERPL-MCNC: 166 MG/DL
GLUCOSE UR QL STRIP: NEGATIVE
HCT VFR BLD AUTO: 39.7 %
HGB BLD-MCNC: 13.2 G/DL
HGB UR QL STRIP: NEGATIVE
IMM GRANULOCYTES # BLD AUTO: 0.06 K/UL
IMM GRANULOCYTES NFR BLD AUTO: 0.4 %
INR PPP: 0.9
KETONES UR QL STRIP: NEGATIVE
LEUKOCYTE ESTERASE UR QL STRIP: NEGATIVE
LYMPHOCYTES # BLD AUTO: 1.1 K/UL
LYMPHOCYTES NFR BLD: 7.9 %
MCH RBC QN AUTO: 32.4 PG
MCHC RBC AUTO-ENTMCNC: 33.2 G/DL
MCV RBC AUTO: 98 FL
MONOCYTES # BLD AUTO: 0.9 K/UL
MONOCYTES NFR BLD: 6.8 %
NEUTROPHILS # BLD AUTO: 11.2 K/UL
NEUTROPHILS NFR BLD: 84.3 %
NITRITE UR QL STRIP: NEGATIVE
NRBC BLD-RTO: 0 /100 WBC
PH UR STRIP: 5 [PH] (ref 5–8)
PLATELET # BLD AUTO: 240 K/UL
PMV BLD AUTO: 10.8 FL
POTASSIUM SERPL-SCNC: 4.1 MMOL/L
PREALB SERPL-MCNC: 27 MG/DL
PROCALCITONIN SERPL IA-MCNC: <0.02 NG/ML
PROT SERPL-MCNC: 6.7 G/DL
PROT UR QL STRIP: NEGATIVE
PROTHROMBIN TIME: 9.9 SEC
RBC # BLD AUTO: 4.07 M/UL
SODIUM SERPL-SCNC: 136 MMOL/L
SP GR UR STRIP: >=1.03 (ref 1–1.03)
TRANSFERRIN SERPL-MCNC: 216 MG/DL
URN SPEC COLLECT METH UR: ABNORMAL
UROBILINOGEN UR STRIP-ACNC: NEGATIVE EU/DL
WBC # BLD AUTO: 13.34 K/UL

## 2018-08-13 PROCEDURE — 25000003 PHARM REV CODE 250: Performed by: EMERGENCY MEDICINE

## 2018-08-13 PROCEDURE — 85025 COMPLETE CBC W/AUTO DIFF WBC: CPT

## 2018-08-13 PROCEDURE — 81003 URINALYSIS AUTO W/O SCOPE: CPT

## 2018-08-13 PROCEDURE — 99285 EMERGENCY DEPT VISIT HI MDM: CPT | Mod: ,,, | Performed by: EMERGENCY MEDICINE

## 2018-08-13 PROCEDURE — 93005 ELECTROCARDIOGRAM TRACING: CPT | Mod: 59

## 2018-08-13 PROCEDURE — 93010 ELECTROCARDIOGRAM REPORT: CPT | Mod: ,,, | Performed by: INTERNAL MEDICINE

## 2018-08-13 PROCEDURE — 29105 APPLICATION LONG ARM SPLINT: CPT

## 2018-08-13 PROCEDURE — 25000003 PHARM REV CODE 250: Performed by: STUDENT IN AN ORGANIZED HEALTH CARE EDUCATION/TRAINING PROGRAM

## 2018-08-13 PROCEDURE — 63600175 PHARM REV CODE 636 W HCPCS: Performed by: STUDENT IN AN ORGANIZED HEALTH CARE EDUCATION/TRAINING PROGRAM

## 2018-08-13 PROCEDURE — 84134 ASSAY OF PREALBUMIN: CPT

## 2018-08-13 PROCEDURE — 85610 PROTHROMBIN TIME: CPT

## 2018-08-13 PROCEDURE — 96374 THER/PROPH/DIAG INJ IV PUSH: CPT | Mod: 59

## 2018-08-13 PROCEDURE — 84145 PROCALCITONIN (PCT): CPT

## 2018-08-13 PROCEDURE — 80053 COMPREHEN METABOLIC PANEL: CPT

## 2018-08-13 PROCEDURE — 84466 ASSAY OF TRANSFERRIN: CPT

## 2018-08-13 PROCEDURE — 99284 EMERGENCY DEPT VISIT MOD MDM: CPT | Mod: 25

## 2018-08-13 RX ORDER — HYDROMORPHONE HYDROCHLORIDE 5 MG/5ML
0.5 SOLUTION ORAL
Status: DISCONTINUED | OUTPATIENT
Start: 2018-08-13 | End: 2018-08-13

## 2018-08-13 RX ORDER — SODIUM CHLORIDE 0.9 % (FLUSH) 0.9 %
3 SYRINGE (ML) INJECTION
Status: CANCELLED | OUTPATIENT
Start: 2018-08-13

## 2018-08-13 RX ORDER — HYDROCODONE BITARTRATE AND ACETAMINOPHEN 5; 325 MG/1; MG/1
1 TABLET ORAL
Status: COMPLETED | OUTPATIENT
Start: 2018-08-13 | End: 2018-08-13

## 2018-08-13 RX ORDER — ACETAMINOPHEN 325 MG/1
650 TABLET ORAL
Status: COMPLETED | OUTPATIENT
Start: 2018-08-13 | End: 2018-08-13

## 2018-08-13 RX ORDER — KETOROLAC TROMETHAMINE 30 MG/ML
10 INJECTION, SOLUTION INTRAMUSCULAR; INTRAVENOUS
Status: COMPLETED | OUTPATIENT
Start: 2018-08-13 | End: 2018-08-13

## 2018-08-13 RX ORDER — HYDROCODONE BITARTRATE AND ACETAMINOPHEN 5; 325 MG/1; MG/1
1 TABLET ORAL EVERY 4 HOURS PRN
Qty: 18 TABLET | Refills: 0 | Status: ON HOLD | OUTPATIENT
Start: 2018-08-13 | End: 2018-08-16 | Stop reason: HOSPADM

## 2018-08-13 RX ORDER — MUPIROCIN 20 MG/G
OINTMENT TOPICAL
Status: CANCELLED | OUTPATIENT
Start: 2018-08-13

## 2018-08-13 RX ORDER — CEFAZOLIN SODIUM 1 G/3ML
2 INJECTION, POWDER, FOR SOLUTION INTRAMUSCULAR; INTRAVENOUS
Status: CANCELLED | OUTPATIENT
Start: 2018-08-13

## 2018-08-13 RX ADMIN — ACETAMINOPHEN 650 MG: 325 TABLET, FILM COATED ORAL at 03:08

## 2018-08-13 RX ADMIN — KETOROLAC TROMETHAMINE 10 MG: 30 INJECTION, SOLUTION INTRAMUSCULAR at 05:08

## 2018-08-13 RX ADMIN — HYDROCODONE BITARTRATE AND ACETAMINOPHEN 1 TABLET: 5; 325 TABLET ORAL at 09:08

## 2018-08-13 RX ADMIN — HYDROCODONE BITARTRATE AND ACETAMINOPHEN 1 TABLET: 5; 325 TABLET ORAL at 04:08

## 2018-08-13 NOTE — H&P (VIEW-ONLY)
Consult Note  Orthopaedics    SUBJECTIVE:     History of Present Illness:  Patient is a 85 y.o. female who presents after a fall onto her left arm with immediate pain in the left elbow. Denies numbness, tingling. No other injuries during the fall. Is overall in good health last ate 930 pm last night.     Scheduled Meds:  Continuous Infusions:  PRN Meds:    Review of patient's allergies indicates:  No Known Allergies    Past Medical History:   Diagnosis Date    Abnormal exam or test finding 2011    coronary calcium score 204 = moderate plaque burden and high coronary heart disease risk    Arthritis     Atherosclerosis of aortic arch     - noted on CXR 2016    Borderline hyperlipidemia     Coronary artery disease involving native coronary artery of native heart without angina pectoris 2016    Osteoporosis, post-menopausal     Urge and stress incontinence     uses pessary; followed by gynecology    Uterine prolapse     Vitamin D deficiency disease      Past Surgical History:   Procedure Laterality Date    CATARACT EXTRACTION, BILATERAL      COSMETIC SURGERY       Family History   Problem Relation Age of Onset    Lung cancer Sister         smoker    Coronary artery disease Father     Stroke Mother     Fibromyalgia Sister     Arthritis Sister         back and knee     Breast cancer Neg Hx     Ovarian cancer Neg Hx     Diabetes Neg Hx     Colon cancer Neg Hx      Social History     Tobacco Use    Smoking status: Former Smoker     Packs/day: 0.50     Types: Cigarettes     Last attempt to quit: 1970     Years since quittin.2    Smokeless tobacco: Never Used   Substance Use Topics    Alcohol use: Yes     Alcohol/week: 1.2 oz     Types: 2 Glasses of wine per week     Comment: once a week    Drug use: No        Review of Systems:  Constitutional: negative for fevers  Eyes: no visual changes  ENT: negative for hearing loss  Respiratory: negative for dyspnea  Cardiovascular: negative  for chest pain  Gastrointestinal: negative for abdominal pain  Genitourinary: negative for dysuria  Neurological: negative for headaches  Behavioral/Psych: negative for hallucinations  Endocrine: negative for temperature intolerance      OBJECTIVE:     Vital Signs (Most Recent)  Temp: 98 °F (36.7 °C) (08/13/18 0303)  Pulse: 70 (08/13/18 0303)  Resp: 18 (08/13/18 0303)  BP: (!) 149/65 (08/13/18 0303)  SpO2: 97 % (08/13/18 0303)    Physical Exam:  Gen:  No acute distress  CV:  Peripherally well-perfused.  Pulses 2+ bilaterally.  Lungs:  Normal respiratory effort.  Abdomen:  Soft, non-tender, non-distended  Head/Neck:  Normocephalic.  Atraumatic. No TTP, AROM and PROM intact without pain  Neuro:  CN intact without deficit, SILT throughout B/L Upper & Lower Extremities  Pelvis: No TTP, Stable to direct anterior pressure over ASIS.    LEFT UPPER EXTREMITY:     INSPECTION  - Skin is intact  PALPATION  - TTP over posterior aspect of elbow  RANGE OF MOTION  - AROM and PROM intact at the wrist  NEUROVASCULAR  - AIN/PIN/Radial/Median/Ulnar Nerves assessed in isolation without deficit  - SILT throughout  - Radial & Ulnar arteries palpated 2+  - Capillary Refill <3s  Laboratory:  No results found for this or any previous visit (from the past 72 hour(s)).    Diagnostic Results:  X-Ray: Reviewed  Left supracondylar humerus fracture.   ASSESSMENT/PLAN:     A/P: Lang DICKSON Valence is a 85 y.o. with left supracondylar humerus fracture, closed and NVI.      Plan:  - Traction view done in ED prior to placing long arm splint. CT scan for operative planning. Consented for ORIF left supracondylar humerus. NPO, NWB SONA. Possible operative fixation today if UA is clean.      Brice Lynch M.D. PGY2  Orthopedic Surgery

## 2018-08-13 NOTE — ED NOTES
Hourly rounding completed on patient. Plan of care discussed and patient has no complaints or questions. Pt is in bed awake and alert. Pt is resting comfortably and is in no acute distress.     The bed is in low, locked position with side rails upx2. Call light is in reach, and patient is oriented to call light use. Pt states they will call nurse if they need assistance.

## 2018-08-13 NOTE — ED NOTES
Pt AAOx3, resting in bed. Respirations even and unlabored. Left arm splinted, moving fingers freely, skin pink/arm. C/o pain to site. MD made aware of pain complaint. Pt assisted to and from restroom for urine specimen. Placed back in bed. Family at bedside. All updated on POC. Side rails up x2. Call light within reach. Will continue to monitor.

## 2018-08-13 NOTE — ED TRIAGE NOTES
85 yr old female pt presents to the ed with complaints of a fall x 45 mins ago. Pt states she we was walking to restroom and losing her  balance after tripping over rug. Pt states she braced herself with her left arm and complains of pain 7/10 non radiating. Pt denies dizziness, chest pain, or sob.

## 2018-08-13 NOTE — ED PROVIDER NOTES
Encounter Date: 2018  SCRIBE #1 NOTE: I, Thomas Ervin, am scribing for, and in the presence of,  Luis Nicole MD.      History     Chief Complaint   Patient presents with    Fall     Pt states she was getting out of bed to go to the bathroom and slipped and fell onto left wrist. Denies head injury/LOC.    Wrist Injury     HPI   85F c/o L elbow and arm pain that radiated to her posterior forearm s/p mechanical accidental fall in her bedroom. Denies head trauma or LOC. Reports exquisite pain with movement.     Review of patient's allergies indicates:  No Known Allergies  Past Medical History:   Diagnosis Date    Abnormal exam or test finding 2011    coronary calcium score 204 = moderate plaque burden and high coronary heart disease risk    Arthritis     Atherosclerosis of aortic arch     - noted on CXR 2016    Borderline hyperlipidemia     Coronary artery disease involving native coronary artery of native heart without angina pectoris 2016    Osteoporosis, post-menopausal     Urge and stress incontinence     uses pessary; followed by gynecology    Uterine prolapse     Vitamin D deficiency disease      Past Surgical History:   Procedure Laterality Date    CATARACT EXTRACTION, BILATERAL      COSMETIC SURGERY       Family History   Problem Relation Age of Onset    Lung cancer Sister         smoker    Coronary artery disease Father     Stroke Mother     Fibromyalgia Sister     Arthritis Sister         back and knee     Breast cancer Neg Hx     Ovarian cancer Neg Hx     Diabetes Neg Hx     Colon cancer Neg Hx      Social History     Tobacco Use    Smoking status: Former Smoker     Packs/day: 0.50     Types: Cigarettes     Last attempt to quit: 1970     Years since quittin.2    Smokeless tobacco: Never Used   Substance Use Topics    Alcohol use: Yes     Alcohol/week: 1.2 oz     Types: 2 Glasses of wine per week     Comment: once a week    Drug use: No     Review of  Systems   Constitutional: Negative for activity change, chills and fever.   HENT: Negative for congestion and sore throat.    Eyes: Negative for photophobia, redness and visual disturbance.   Respiratory: Negative for chest tightness and shortness of breath.    Cardiovascular: Negative for chest pain and palpitations.   Gastrointestinal: Negative for abdominal distention, abdominal pain, nausea and vomiting.   Genitourinary: Negative for dysuria and flank pain.   Musculoskeletal: Negative for back pain and neck stiffness.   Skin: Negative for rash and wound.   Neurological: Negative for seizures and facial asymmetry.   Psychiatric/Behavioral: Negative for agitation and hallucinations.       Physical Exam     Initial Vitals [08/13/18 0303]   BP Pulse Resp Temp SpO2   (!) 149/65 70 18 98 °F (36.7 °C) 97 %      MAP       --         Physical Exam    Nursing note and vitals reviewed.  Constitutional: She appears well-developed and well-nourished. She is not diaphoretic. No distress.   HENT:   Head: Normocephalic and atraumatic.   Eyes: Conjunctivae and EOM are normal. Pupils are equal, round, and reactive to light.   Neck: Normal range of motion. Neck supple.   Cardiovascular: Normal rate and intact distal pulses.   Pulmonary/Chest: Breath sounds normal. No respiratory distress. She has no wheezes. She has no rhonchi. She has no rales.   Musculoskeletal: She exhibits tenderness.   ROM with external rotation and adduction limited 2/2 LUE pain; L elbow grossly enlarged and edematous with +TTP, neurovasc intact   Neurological: She is alert and oriented to person, place, and time.   Skin: Skin is warm and dry. Capillary refill takes less than 2 seconds. No rash and no abscess noted. No erythema. No pallor.         ED Course   Procedures  Labs Reviewed - No data to display      MDM: 85F c/o L arm and elbow pain status post fall. Initial ddx included but was not limited to: elbow dislocation, supracondylar fracture, humeral  fracture, neurovasc injury. XR with e/o supracondylar fracture. Ortho consulted. Pain medications and ice given. Dispo pending Ortho recs.  Maik Graham MD  PGY-3 [employer] [facility]  08/13/2018 4:45AM    Update: Fracture reduced; awaiting final Ortho recs re: OR today. Currently NPO and NWB.   Maik Graham MD  PGY-3 [employer] [facility]   08/13/2018 5:39AM    Update: Ortho will give final recommendations after reviewing OR schedule. Awaiting final disposition. Should this extend to the oncoming shift, I will prepare the discharge paperwork in case the patient is to be discharged and sign over the remainder of the care of this patient to the oncoming shift's resident and attending.   Maik Graham MD  PGY-3 [employer] [facility]  08/13/2018 5:58AM  Imaging Results    None                APC / Resident Notes:   9:38 AM  Ortho seen patient, plan to have surgery on 8/16.         Attending Attestation:   Physician Attestation Statement for Resident:  As the supervising MD   Physician Attestation Statement: I have personally seen and examined this patient.   I agree with the above history. -:   As the supervising MD I agree with the above PE.    As the supervising MD I agree with the above treatment, course, plan, and disposition.   -: distal neurovasculatore on the left hand specifically radial function                       Clinical Impression:   The primary encounter diagnosis was Closed displaced comminuted supracondylar fracture of left humerus without intercondylar fracture, initial encounter. Diagnoses of Wrist pain and Fall were also pertinent to this visit.      Disposition:   Disposition: Discharged  Condition: Stable                        Jam Radford MD  Resident  08/13/18 0933

## 2018-08-15 ENCOUNTER — TELEPHONE (OUTPATIENT)
Dept: ORTHOPEDICS | Facility: CLINIC | Age: 83
End: 2018-08-15

## 2018-08-15 ENCOUNTER — ANESTHESIA EVENT (OUTPATIENT)
Dept: SURGERY | Facility: HOSPITAL | Age: 83
End: 2018-08-15
Payer: MEDICARE

## 2018-08-15 NOTE — TELEPHONE ENCOUNTER
Spoke with Bharti, pt's daughter.   Advised NPO after midnight.  Arrival time of 8 am tomorrow.   Bharti verbalized understanding.

## 2018-08-16 ENCOUNTER — HOSPITAL ENCOUNTER (OUTPATIENT)
Facility: HOSPITAL | Age: 83
Discharge: HOME | End: 2018-08-18
Attending: ORTHOPAEDIC SURGERY | Admitting: ORTHOPAEDIC SURGERY
Payer: MEDICARE

## 2018-08-16 ENCOUNTER — ANESTHESIA (OUTPATIENT)
Dept: SURGERY | Facility: HOSPITAL | Age: 83
End: 2018-08-16
Payer: MEDICARE

## 2018-08-16 DIAGNOSIS — S42.412A CLOSED SUPRACONDYLAR FRACTURE OF LEFT HUMERUS, INITIAL ENCOUNTER: ICD-10-CM

## 2018-08-16 DIAGNOSIS — S42.493A: Primary | ICD-10-CM

## 2018-08-16 LAB
ALBUMIN SERPL BCP-MCNC: 3.4 G/DL
ALP SERPL-CCNC: 59 U/L
ALT SERPL W/O P-5'-P-CCNC: 14 U/L
ANION GAP SERPL CALC-SCNC: 12 MMOL/L
AST SERPL-CCNC: 22 U/L
BASOPHILS # BLD AUTO: 0.04 K/UL
BASOPHILS NFR BLD: 0.3 %
BILIRUB SERPL-MCNC: 0.9 MG/DL
BUN SERPL-MCNC: 14 MG/DL
CALCIUM SERPL-MCNC: 8.4 MG/DL
CHLORIDE SERPL-SCNC: 106 MMOL/L
CO2 SERPL-SCNC: 19 MMOL/L
CREAT SERPL-MCNC: 0.7 MG/DL
DIFFERENTIAL METHOD: ABNORMAL
EOSINOPHIL # BLD AUTO: 0 K/UL
EOSINOPHIL NFR BLD: 0 %
ERYTHROCYTE [DISTWIDTH] IN BLOOD BY AUTOMATED COUNT: 12.3 %
EST. GFR  (AFRICAN AMERICAN): >60 ML/MIN/1.73 M^2
EST. GFR  (NON AFRICAN AMERICAN): >60 ML/MIN/1.73 M^2
GLUCOSE SERPL-MCNC: 149 MG/DL
HCT VFR BLD AUTO: 40.5 %
HGB BLD-MCNC: 12.7 G/DL
IMM GRANULOCYTES # BLD AUTO: 0.08 K/UL
IMM GRANULOCYTES NFR BLD AUTO: 0.5 %
LYMPHOCYTES # BLD AUTO: 0.6 K/UL
LYMPHOCYTES NFR BLD: 3.8 %
MCH RBC QN AUTO: 31.8 PG
MCHC RBC AUTO-ENTMCNC: 31.4 G/DL
MCV RBC AUTO: 101 FL
MONOCYTES # BLD AUTO: 0.6 K/UL
MONOCYTES NFR BLD: 4 %
NEUTROPHILS # BLD AUTO: 13.6 K/UL
NEUTROPHILS NFR BLD: 91.4 %
NRBC BLD-RTO: 0 /100 WBC
PLATELET # BLD AUTO: 223 K/UL
PMV BLD AUTO: 10.2 FL
POTASSIUM SERPL-SCNC: 3.7 MMOL/L
PROT SERPL-MCNC: 6.8 G/DL
RBC # BLD AUTO: 4 M/UL
SODIUM SERPL-SCNC: 137 MMOL/L
WBC # BLD AUTO: 14.84 K/UL

## 2018-08-16 PROCEDURE — 63600175 PHARM REV CODE 636 W HCPCS: Performed by: ANESTHESIOLOGY

## 2018-08-16 PROCEDURE — 80053 COMPREHEN METABOLIC PANEL: CPT

## 2018-08-16 PROCEDURE — 37000009 HC ANESTHESIA EA ADD 15 MINS: Performed by: ORTHOPAEDIC SURGERY

## 2018-08-16 PROCEDURE — 85025 COMPLETE CBC W/AUTO DIFF WBC: CPT

## 2018-08-16 PROCEDURE — 25000003 PHARM REV CODE 250: Performed by: STUDENT IN AN ORGANIZED HEALTH CARE EDUCATION/TRAINING PROGRAM

## 2018-08-16 PROCEDURE — 71000039 HC RECOVERY, EACH ADD'L HOUR: Performed by: ORTHOPAEDIC SURGERY

## 2018-08-16 PROCEDURE — 37000008 HC ANESTHESIA 1ST 15 MINUTES: Performed by: ORTHOPAEDIC SURGERY

## 2018-08-16 PROCEDURE — 11000001 HC ACUTE MED/SURG PRIVATE ROOM

## 2018-08-16 PROCEDURE — 27201423 OPTIME MED/SURG SUP & DEVICES STERILE SUPPLY: Performed by: ORTHOPAEDIC SURGERY

## 2018-08-16 PROCEDURE — 25000003 PHARM REV CODE 250: Performed by: ANESTHESIOLOGY

## 2018-08-16 PROCEDURE — 63600175 PHARM REV CODE 636 W HCPCS: Performed by: NURSE ANESTHETIST, CERTIFIED REGISTERED

## 2018-08-16 PROCEDURE — 25000003 PHARM REV CODE 250: Performed by: NURSE ANESTHETIST, CERTIFIED REGISTERED

## 2018-08-16 PROCEDURE — 71000033 HC RECOVERY, INTIAL HOUR: Performed by: ORTHOPAEDIC SURGERY

## 2018-08-16 PROCEDURE — D9220A PRA ANESTHESIA: Mod: ANES,,, | Performed by: ANESTHESIOLOGY

## 2018-08-16 PROCEDURE — S0077 INJECTION, CLINDAMYCIN PHOSP: HCPCS | Performed by: NURSE ANESTHETIST, CERTIFIED REGISTERED

## 2018-08-16 PROCEDURE — 64415 NJX AA&/STRD BRCH PLXS IMG: CPT | Mod: 59,LT,, | Performed by: ANESTHESIOLOGY

## 2018-08-16 PROCEDURE — C1769 GUIDE WIRE: HCPCS | Performed by: ORTHOPAEDIC SURGERY

## 2018-08-16 PROCEDURE — 24546 OPTX HUM FX W/NTRCNDYLR XTN: CPT | Mod: LT,,, | Performed by: ORTHOPAEDIC SURGERY

## 2018-08-16 PROCEDURE — 63600175 PHARM REV CODE 636 W HCPCS: Performed by: STUDENT IN AN ORGANIZED HEALTH CARE EDUCATION/TRAINING PROGRAM

## 2018-08-16 PROCEDURE — 76942 ECHO GUIDE FOR BIOPSY: CPT | Performed by: ANESTHESIOLOGY

## 2018-08-16 PROCEDURE — D9220A PRA ANESTHESIA: Mod: CRNA,,, | Performed by: NURSE ANESTHETIST, CERTIFIED REGISTERED

## 2018-08-16 PROCEDURE — C1713 ANCHOR/SCREW BN/BN,TIS/BN: HCPCS | Performed by: ORTHOPAEDIC SURGERY

## 2018-08-16 PROCEDURE — 36000710: Performed by: ORTHOPAEDIC SURGERY

## 2018-08-16 PROCEDURE — 36000711: Performed by: ORTHOPAEDIC SURGERY

## 2018-08-16 DEVICE — SCREW BONE VA LK 2.7X34MM: Type: IMPLANTABLE DEVICE | Site: ARM | Status: FUNCTIONAL

## 2018-08-16 DEVICE — K-WIRE TRCR PT1.6MM DIA 150MM: Type: IMPLANTABLE DEVICE | Site: ARM | Status: FUNCTIONAL

## 2018-08-16 DEVICE — IMPLANTABLE DEVICE: Type: IMPLANTABLE DEVICE | Site: ARM | Status: FUNCTIONAL

## 2018-08-16 DEVICE — SCREW 2.7MM X 12 LOCKING ANGLE: Type: IMPLANTABLE DEVICE | Site: ARM | Status: FUNCTIONAL

## 2018-08-16 DEVICE — SCREW CORTEX 3.5 X 22: Type: IMPLANTABLE DEVICE | Site: ARM | Status: FUNCTIONAL

## 2018-08-16 DEVICE — SCREW BONE LOCK VA 2.7X50MM: Type: IMPLANTABLE DEVICE | Site: ARM | Status: FUNCTIONAL

## 2018-08-16 DEVICE — SCREW CORTEX S/T 2.7X34: Type: IMPLANTABLE DEVICE | Site: ARM | Status: FUNCTIONAL

## 2018-08-16 RX ORDER — LIDOCAINE HCL/PF 100 MG/5ML
SYRINGE (ML) INTRAVENOUS
Status: DISCONTINUED | OUTPATIENT
Start: 2018-08-16 | End: 2018-08-16

## 2018-08-16 RX ORDER — CELECOXIB 200 MG/1
200 CAPSULE ORAL DAILY
Status: DISCONTINUED | OUTPATIENT
Start: 2018-08-17 | End: 2018-08-16

## 2018-08-16 RX ORDER — LIDOCAINE HYDROCHLORIDE AND EPINEPHRINE 15; 5 MG/ML; UG/ML
INJECTION, SOLUTION EPIDURAL
Status: COMPLETED | OUTPATIENT
Start: 2018-08-16 | End: 2018-08-16

## 2018-08-16 RX ORDER — ONDANSETRON 8 MG/1
8 TABLET, ORALLY DISINTEGRATING ORAL EVERY 8 HOURS PRN
Status: DISCONTINUED | OUTPATIENT
Start: 2018-08-16 | End: 2018-08-18 | Stop reason: HOSPADM

## 2018-08-16 RX ORDER — NEOSTIGMINE METHYLSULFATE 1 MG/ML
INJECTION, SOLUTION INTRAVENOUS
Status: DISCONTINUED | OUTPATIENT
Start: 2018-08-16 | End: 2018-08-16

## 2018-08-16 RX ORDER — PHENYLEPHRINE HYDROCHLORIDE 10 MG/ML
INJECTION INTRAVENOUS
Status: DISCONTINUED | OUTPATIENT
Start: 2018-08-16 | End: 2018-08-16

## 2018-08-16 RX ORDER — MUPIROCIN 20 MG/G
OINTMENT TOPICAL
Status: CANCELLED | OUTPATIENT
Start: 2018-08-16

## 2018-08-16 RX ORDER — MUPIROCIN 20 MG/G
1 OINTMENT TOPICAL 2 TIMES DAILY
Status: DISCONTINUED | OUTPATIENT
Start: 2018-08-16 | End: 2018-08-18 | Stop reason: HOSPADM

## 2018-08-16 RX ORDER — POLYETHYLENE GLYCOL 3350 17 G/17G
17 POWDER, FOR SOLUTION ORAL DAILY
Status: DISCONTINUED | OUTPATIENT
Start: 2018-08-17 | End: 2018-08-18 | Stop reason: HOSPADM

## 2018-08-16 RX ORDER — OXYCODONE HYDROCHLORIDE 5 MG/1
5 TABLET ORAL EVERY 4 HOURS PRN
Status: DISCONTINUED | OUTPATIENT
Start: 2018-08-16 | End: 2018-08-17

## 2018-08-16 RX ORDER — SODIUM CHLORIDE 0.9 % (FLUSH) 0.9 %
3 SYRINGE (ML) INJECTION
Status: DISCONTINUED | OUTPATIENT
Start: 2018-08-16 | End: 2018-08-18 | Stop reason: HOSPADM

## 2018-08-16 RX ORDER — FENTANYL CITRATE 50 UG/ML
INJECTION, SOLUTION INTRAMUSCULAR; INTRAVENOUS
Status: DISCONTINUED | OUTPATIENT
Start: 2018-08-16 | End: 2018-08-16

## 2018-08-16 RX ORDER — ROPIVACAINE HYDROCHLORIDE 2 MG/ML
8 INJECTION, SOLUTION EPIDURAL; INFILTRATION; PERINEURAL CONTINUOUS
Status: DISCONTINUED | OUTPATIENT
Start: 2018-08-16 | End: 2018-08-18 | Stop reason: HOSPADM

## 2018-08-16 RX ORDER — RAMELTEON 8 MG/1
8 TABLET ORAL NIGHTLY PRN
Status: DISCONTINUED | OUTPATIENT
Start: 2018-08-16 | End: 2018-08-18 | Stop reason: HOSPADM

## 2018-08-16 RX ORDER — PREGABALIN 75 MG/1
75 CAPSULE ORAL NIGHTLY
Status: DISCONTINUED | OUTPATIENT
Start: 2018-08-16 | End: 2018-08-18 | Stop reason: HOSPADM

## 2018-08-16 RX ORDER — OXYCODONE HYDROCHLORIDE 5 MG/1
15 TABLET ORAL EVERY 4 HOURS PRN
Status: DISCONTINUED | OUTPATIENT
Start: 2018-08-16 | End: 2018-08-17

## 2018-08-16 RX ORDER — ROCURONIUM BROMIDE 10 MG/ML
INJECTION, SOLUTION INTRAVENOUS
Status: DISCONTINUED | OUTPATIENT
Start: 2018-08-16 | End: 2018-08-16

## 2018-08-16 RX ORDER — ONDANSETRON HYDROCHLORIDE 8 MG/1
8 TABLET, FILM COATED ORAL EVERY 8 HOURS PRN
Qty: 60 TABLET | Refills: 0 | Status: SHIPPED | OUTPATIENT
Start: 2018-08-16 | End: 2019-02-18

## 2018-08-16 RX ORDER — GLYCOPYRROLATE 0.2 MG/ML
INJECTION INTRAMUSCULAR; INTRAVENOUS
Status: DISCONTINUED | OUTPATIENT
Start: 2018-08-16 | End: 2018-08-16

## 2018-08-16 RX ORDER — ACETAMINOPHEN 500 MG
1000 TABLET ORAL EVERY 6 HOURS
Status: COMPLETED | OUTPATIENT
Start: 2018-08-16 | End: 2018-08-18

## 2018-08-16 RX ORDER — DEXAMETHASONE SODIUM PHOSPHATE 4 MG/ML
INJECTION, SOLUTION INTRA-ARTICULAR; INTRALESIONAL; INTRAMUSCULAR; INTRAVENOUS; SOFT TISSUE
Status: DISCONTINUED | OUTPATIENT
Start: 2018-08-16 | End: 2018-08-16

## 2018-08-16 RX ORDER — OXYCODONE AND ACETAMINOPHEN 10; 325 MG/1; MG/1
1 TABLET ORAL EVERY 4 HOURS PRN
Qty: 45 TABLET | Refills: 0 | Status: SHIPPED | OUTPATIENT
Start: 2018-08-16 | End: 2018-08-24 | Stop reason: SDUPTHER

## 2018-08-16 RX ORDER — CELECOXIB 200 MG/1
400 CAPSULE ORAL ONCE
Status: DISCONTINUED | OUTPATIENT
Start: 2018-08-16 | End: 2018-08-16

## 2018-08-16 RX ORDER — ONDANSETRON 2 MG/ML
4 INJECTION INTRAMUSCULAR; INTRAVENOUS EVERY 12 HOURS PRN
Status: DISCONTINUED | OUTPATIENT
Start: 2018-08-16 | End: 2018-08-18 | Stop reason: HOSPADM

## 2018-08-16 RX ORDER — CEFAZOLIN SODIUM 1 G/3ML
2 INJECTION, POWDER, FOR SOLUTION INTRAMUSCULAR; INTRAVENOUS
Status: DISPENSED | OUTPATIENT
Start: 2018-08-16 | End: 2018-08-17

## 2018-08-16 RX ORDER — SODIUM CHLORIDE 0.9 % (FLUSH) 0.9 %
5 SYRINGE (ML) INJECTION
Status: DISCONTINUED | OUTPATIENT
Start: 2018-08-16 | End: 2018-08-18 | Stop reason: HOSPADM

## 2018-08-16 RX ORDER — FENTANYL CITRATE 50 UG/ML
25 INJECTION, SOLUTION INTRAMUSCULAR; INTRAVENOUS EVERY 5 MIN PRN
Status: COMPLETED | OUTPATIENT
Start: 2018-08-16 | End: 2018-08-16

## 2018-08-16 RX ORDER — SODIUM CHLORIDE 9 MG/ML
INJECTION, SOLUTION INTRAVENOUS CONTINUOUS
Status: DISCONTINUED | OUTPATIENT
Start: 2018-08-16 | End: 2018-08-18 | Stop reason: HOSPADM

## 2018-08-16 RX ORDER — PRAVASTATIN SODIUM 20 MG/1
20 TABLET ORAL DAILY
COMMUNITY
End: 2018-11-20 | Stop reason: SDUPTHER

## 2018-08-16 RX ORDER — AMOXICILLIN 250 MG
1 CAPSULE ORAL 2 TIMES DAILY
Status: DISCONTINUED | OUTPATIENT
Start: 2018-08-16 | End: 2018-08-18 | Stop reason: HOSPADM

## 2018-08-16 RX ORDER — OXYCODONE HYDROCHLORIDE 5 MG/1
10 TABLET ORAL EVERY 4 HOURS PRN
Status: DISCONTINUED | OUTPATIENT
Start: 2018-08-16 | End: 2018-08-17

## 2018-08-16 RX ORDER — ACETAMINOPHEN 10 MG/ML
1000 INJECTION, SOLUTION INTRAVENOUS ONCE
Status: COMPLETED | OUTPATIENT
Start: 2018-08-16 | End: 2018-08-16

## 2018-08-16 RX ORDER — MORPHINE SULFATE 2 MG/ML
4 INJECTION, SOLUTION INTRAMUSCULAR; INTRAVENOUS
Status: DISCONTINUED | OUTPATIENT
Start: 2018-08-16 | End: 2018-08-17

## 2018-08-16 RX ORDER — MORPHINE SULFATE 2 MG/ML
2 INJECTION, SOLUTION INTRAMUSCULAR; INTRAVENOUS
Status: DISCONTINUED | OUTPATIENT
Start: 2018-08-16 | End: 2018-08-17

## 2018-08-16 RX ORDER — MUPIROCIN 20 MG/G
OINTMENT TOPICAL
Status: DISCONTINUED | OUTPATIENT
Start: 2018-08-16 | End: 2018-08-16 | Stop reason: HOSPADM

## 2018-08-16 RX ORDER — PROPOFOL 10 MG/ML
VIAL (ML) INTRAVENOUS
Status: DISCONTINUED | OUTPATIENT
Start: 2018-08-16 | End: 2018-08-16

## 2018-08-16 RX ORDER — LIDOCAINE HYDROCHLORIDE 10 MG/ML
1 INJECTION, SOLUTION EPIDURAL; INFILTRATION; INTRACAUDAL; PERINEURAL ONCE
Status: COMPLETED | OUTPATIENT
Start: 2018-08-16 | End: 2018-08-16

## 2018-08-16 RX ORDER — ASPIRIN 81 MG
100 TABLET, DELAYED RELEASE (ENTERIC COATED) ORAL 2 TIMES DAILY
Qty: 60 TABLET | Refills: 0 | Status: SHIPPED | OUTPATIENT
Start: 2018-08-16 | End: 2019-02-18

## 2018-08-16 RX ORDER — CEFAZOLIN SODIUM 1 G/3ML
2 INJECTION, POWDER, FOR SOLUTION INTRAMUSCULAR; INTRAVENOUS
Status: COMPLETED | OUTPATIENT
Start: 2018-08-16 | End: 2018-08-16

## 2018-08-16 RX ORDER — ONDANSETRON HYDROCHLORIDE 2 MG/ML
INJECTION, SOLUTION INTRAMUSCULAR; INTRAVENOUS
Status: DISCONTINUED | OUTPATIENT
Start: 2018-08-16 | End: 2018-08-16

## 2018-08-16 RX ORDER — MIDAZOLAM HYDROCHLORIDE 1 MG/ML
0.5 INJECTION INTRAMUSCULAR; INTRAVENOUS
Status: DISCONTINUED | OUTPATIENT
Start: 2018-08-16 | End: 2018-08-16 | Stop reason: HOSPADM

## 2018-08-16 RX ORDER — CLINDAMYCIN PHOSPHATE 900 MG/50ML
INJECTION, SOLUTION INTRAVENOUS
Status: DISCONTINUED | OUTPATIENT
Start: 2018-08-16 | End: 2018-08-16

## 2018-08-16 RX ORDER — FENTANYL CITRATE 50 UG/ML
25 INJECTION, SOLUTION INTRAMUSCULAR; INTRAVENOUS EVERY 5 MIN PRN
Status: DISCONTINUED | OUTPATIENT
Start: 2018-08-16 | End: 2018-08-16 | Stop reason: HOSPADM

## 2018-08-16 RX ADMIN — SODIUM CHLORIDE: 0.9 INJECTION, SOLUTION INTRAVENOUS at 10:08

## 2018-08-16 RX ADMIN — ROCURONIUM BROMIDE 40 MG: 10 INJECTION, SOLUTION INTRAVENOUS at 02:08

## 2018-08-16 RX ADMIN — RAMELTEON 8 MG: 8 TABLET, FILM COATED ORAL at 10:08

## 2018-08-16 RX ADMIN — PHENYLEPHRINE HYDROCHLORIDE 50 MCG: 10 INJECTION INTRAVENOUS at 02:08

## 2018-08-16 RX ADMIN — OXYCODONE HYDROCHLORIDE 10 MG: 5 TABLET ORAL at 10:08

## 2018-08-16 RX ADMIN — FENTANYL CITRATE 25 MCG: 50 INJECTION INTRAMUSCULAR; INTRAVENOUS at 11:08

## 2018-08-16 RX ADMIN — ONDANSETRON 4 MG: 2 INJECTION, SOLUTION INTRAMUSCULAR; INTRAVENOUS at 06:08

## 2018-08-16 RX ADMIN — LIDOCAINE HYDROCHLORIDE,EPINEPHRINE BITARTRATE 3 ML: 15; .005 INJECTION, SOLUTION EPIDURAL; INFILTRATION; INTRACAUDAL; PERINEURAL at 11:08

## 2018-08-16 RX ADMIN — MUPIROCIN: 20 OINTMENT TOPICAL at 11:08

## 2018-08-16 RX ADMIN — CLINDAMYCIN PHOSPHATE 900 MG: 18 INJECTION, SOLUTION INTRAVENOUS at 02:08

## 2018-08-16 RX ADMIN — OXYCODONE HYDROCHLORIDE 5 MG: 5 TABLET ORAL at 07:08

## 2018-08-16 RX ADMIN — PREGABALIN 75 MG: 75 CAPSULE ORAL at 08:08

## 2018-08-16 RX ADMIN — CEFAZOLIN 2 G: 330 INJECTION, POWDER, FOR SOLUTION INTRAMUSCULAR; INTRAVENOUS at 02:08

## 2018-08-16 RX ADMIN — CEFAZOLIN 2 G: 330 INJECTION, POWDER, FOR SOLUTION INTRAMUSCULAR; INTRAVENOUS at 10:08

## 2018-08-16 RX ADMIN — ACETAMINOPHEN 1000 MG: 500 TABLET, FILM COATED ORAL at 10:08

## 2018-08-16 RX ADMIN — NEOSTIGMINE METHYLSULFATE 3 MG: 1 INJECTION INTRAVENOUS at 05:08

## 2018-08-16 RX ADMIN — MUPIROCIN 1 G: 20 OINTMENT TOPICAL at 08:08

## 2018-08-16 RX ADMIN — LIDOCAINE HYDROCHLORIDE 10 MG: 10 INJECTION, SOLUTION EPIDURAL; INFILTRATION; INTRACAUDAL; PERINEURAL at 10:08

## 2018-08-16 RX ADMIN — ROPIVACAINE HYDROCHLORIDE 6 ML/HR: 2 INJECTION, SOLUTION EPIDURAL; INFILTRATION at 07:08

## 2018-08-16 RX ADMIN — SODIUM CHLORIDE, SODIUM GLUCONATE, SODIUM ACETATE, POTASSIUM CHLORIDE, MAGNESIUM CHLORIDE, SODIUM PHOSPHATE, DIBASIC, AND POTASSIUM PHOSPHATE: .53; .5; .37; .037; .03; .012; .00082 INJECTION, SOLUTION INTRAVENOUS at 05:08

## 2018-08-16 RX ADMIN — FENTANYL CITRATE 25 MCG: 50 INJECTION INTRAMUSCULAR; INTRAVENOUS at 07:08

## 2018-08-16 RX ADMIN — LIDOCAINE HYDROCHLORIDE 60 MG: 20 INJECTION, SOLUTION INTRAVENOUS at 02:08

## 2018-08-16 RX ADMIN — FENTANYL CITRATE 25 MCG: 50 INJECTION, SOLUTION INTRAMUSCULAR; INTRAVENOUS at 06:08

## 2018-08-16 RX ADMIN — FENTANYL CITRATE 25 MCG: 50 INJECTION, SOLUTION INTRAMUSCULAR; INTRAVENOUS at 03:08

## 2018-08-16 RX ADMIN — PROPOFOL 100 MG: 10 INJECTION, EMULSION INTRAVENOUS at 02:08

## 2018-08-16 RX ADMIN — FENTANYL CITRATE 25 MCG: 50 INJECTION, SOLUTION INTRAMUSCULAR; INTRAVENOUS at 04:08

## 2018-08-16 RX ADMIN — Medication 2 MG: at 08:08

## 2018-08-16 RX ADMIN — GLYCOPYRROLATE 0.4 MG: 0.2 INJECTION, SOLUTION INTRAMUSCULAR; INTRAVENOUS at 05:08

## 2018-08-16 RX ADMIN — FENTANYL CITRATE 75 MCG: 50 INJECTION, SOLUTION INTRAMUSCULAR; INTRAVENOUS at 02:08

## 2018-08-16 RX ADMIN — PHENYLEPHRINE HYDROCHLORIDE 100 MCG: 10 INJECTION INTRAVENOUS at 05:08

## 2018-08-16 RX ADMIN — ACETAMINOPHEN 1000 MG: 10 INJECTION, SOLUTION INTRAVENOUS at 05:08

## 2018-08-16 RX ADMIN — DEXAMETHASONE SODIUM PHOSPHATE 4 MG: 4 INJECTION, SOLUTION INTRAMUSCULAR; INTRAVENOUS at 03:08

## 2018-08-16 RX ADMIN — SENNOSIDES AND DOCUSATE SODIUM 1 TABLET: 8.6; 5 TABLET ORAL at 08:08

## 2018-08-16 NOTE — PLAN OF CARE
[facility]    HOME HEALTH ORDERS  FACE TO FACE ENCOUNTER    Patient Name: Lang Kumar  YOB: 1932    PCP: Moon Mccallum MD   PCP Address: [address] / [place] [address]  PCP Phone Number: [phone]  PCP Fax: [phone]    Encounter Date: 08/16/2018    Admit to Home Health    Diagnoses:  Active Hospital Problems    Diagnosis  POA    *Closed bicondylar fracture of distal humerus [S42.493A]  Yes      Resolved Hospital Problems   No resolved problems to display.       Future Appointments   Date Time Provider Department Center   9/24/2018 10:30 AM [facility] FRACTURE CARE CLINIC [facility] FRA CAR Ronald Velazco     Follow-up Information     Lisbet Saucedo PA-C In 2 weeks.    Specialty:  Orthopedic Surgery  Contact information:  [address]  [facility] [address]  [phone]                     I have seen and examined this patient face to face today. My clinical findings that support the need for the home health skilled services and home bound status are the following:  Weakness/numbness causing balance and gait disturbance due to Fracture making it taxing to leave home.    Allergies:Review of patient's allergies indicates:  No Known Allergies    Diet: regular diet    Activities: s/p L distal humerus ORIF.  NWB LUE.  Can do ROM of fingers    Home Health Admitting Clinician:   SN/PT to complete comprehensive assessment including routine vital signs. Instruct on disease process and s/s of complications to report to MD. Follow specific home health arthoplasty protocol. Review/verify medication list sent home with the patient at time of discharge  and instruct patient/caregiver as needed. If coumadin ordered, coumadin clinic to manage INR with INR draws 2x per week with a goal to maintain INR between 1.8 and 2.2. Frequency may be adjusted depending on start of care date.    Notify MD if SBP > 160 or < 90; DBP > 90 or < 50; HR > 120 or < 50; Temp > 101    Home Medical  Equipment:  Walker, 3-1 bedside commode, transfer tub bench    CONSULTS:    Physical Therapy may admit if patient not on coumadin, PT to perform comprehensive assessment if performing admit visit and generate therapy plan of care. Evaluate for home safety and equipment needs; Establish/upgrade home exercise program. Perform/instruct on therapeutic exercises, gait training, transfer training, and Range of Motion.      OTHER: (only select if patient needs other therapy disciplines)  Occupational Therapy to evaluate and treat. Evaluate home environment for safety and equipment needs. Perform/Instruct on transfers, ADL training, ROM, and therapeutic exercises.    MISCELLANEOUS CARE:  Routine Skin for Bedridden Patients: Instruct patient/caregiver to apply moisture barrier cream to all skin folds and wet areas in perineal area daily and after baths and all bowel movements.    WOUND CARE ORDERS:  Assess Surgical Incision/DSRG each TX  Aquacel AG drsg applied post-op leave on 14 days post op. Call MD if any drainage reaches border to border of drsg horizontally, s/s of infection, temp >101, induration, swelling or redness.  If dressing is removed per MD order, then apply island dressing, change/teach caregiver to perform daily dressing change if island dressing present.    Medications: Review discharge medications with patient and family and provide education.      Current Discharge Medication List      START taking these medications    Details   docusate sodium 100 mg capsule Take 100 mg by mouth 2 (two) times daily.  Qty: 60 tablet, Refills: 0      ondansetron (ZOFRAN) 8 MG tablet Take 1 tablet (8 mg total) by mouth every 8 (eight) hours as needed for Nausea.  Qty: 60 tablet, Refills: 0      oxyCODONE-acetaminophen (PERCOCET)  mg per tablet Take 1 tablet by mouth every 4 (four) hours as needed for Pain.  Qty: 45 tablet, Refills: 0         CONTINUE these medications which have NOT CHANGED    Details   alendronate  (FOSAMAX) 70 MG tablet Take 1 tablet (70 mg total) by mouth every 7 days.  Qty: 12 tablet, Refills: 3    Associated Diagnoses: Osteoporosis, post-menopausal      pravastatin (PRAVACHOL) 20 MG tablet Take 20 mg by mouth once daily.      aspirin (ECOTRIN) 81 MG EC tablet Take 1 tablet (81 mg total) by mouth once daily.  Qty: 90 tablet, Refills: 3      ergocalciferol (VITAMIN D2) 50,000 unit Cap Take 1 capsule (50,000 Units total) by mouth every 7 days.  Qty: 4 capsule, Refills: 11    Associated Diagnoses: Vitamin D deficiency disease      multivitamin (ONE DAILY MULTIVITAMIN) per tablet Take 1 tablet by mouth once daily.         STOP taking these medications       HYDROcodone-acetaminophen (NORCO) 5-325 mg per tablet Comments:   Reason for Stopping:               I certify that this patient is confined to her home and needs intermittent skilled nursing care, physical therapy and occupational therapy.

## 2018-08-16 NOTE — BRIEF OP NOTE
BRIEF OP NOTE    Preop Dx: Left supracondylar distal humerus fracture with intercondylar extension    Postop Dx: Left supracondylar distal humerus fracture with intercondylar extension    Procedure: Open treatment of left supracondylar/intercondylar humerus fracture with open reduction and internal fixation - 71358    Left elbow ulnar nerve decompression - 64082    Surgeon: Sonu Carmona M.D.    Asst:  Erick Rene M.D    Anesthesia: GETA    EBL:  75cc     IVF:  1100cc crystalloid    Implants: Synthes    Specimens: None    Findings: Very distal facture.  Non displaced IA fracture line.  Good fixation    Dispo:  To PACU extubated/stable     Dict#  [id number]

## 2018-08-16 NOTE — OP NOTE
DATE OF PROCEDURE:  08/16/2018    PREOPERATIVE DIAGNOSIS:  Left supracondylar distal humerus fracture with   intercondylar extension.    POSTOPERATIVE DIAGNOSIS:  Left supracondylar distal humerus fracture with   intercondylar extension.    PROCEDURES PERFORMED:  1.  Open treatment of left supracondylar/intercondylar humerus fracture with   open reduction and internal fixation, 96358.  2.  Left ulnar nerve decompression at elbow, 14332.    SURGEON:  Sonu Carmona M.D.    ASSISTANT:  Erick Rene.    ANESTHESIA:  General endotracheal.    ESTIMATED BLOOD LOSS:  75 mL.    IV FLUIDS:  1100 mL of crystalloid.    IMPLANTS:  Synthes.    TOURNIQUET TIME:  120 minutes at 250 mmHg.    INDICATIONS FOR PROCEDURE:  The patient is an 85-year-old female who fell   sustaining a very distal left supracondylar humerus fracture with intercondylar   extension.  The patient is now going to the Operating Room for open reduction   and internal fixation.  The risks, benefits, and alternatives of the surgery   were discussed at length with the patient prior to going to the Operating Room.    Informed consent was obtained.    PROCEDURE IN DETAIL:  The patient was identified in the preoperative holding   area and the site was marked.  Regional analgesia was performed in the form of   an interscalene catheter.  The patient was wheeled into the Operating Room and   general endotracheal anesthesia was induced in the patient's hospital bed.  The   patient was then placed on the Operating Room table in a prone position with all   bony prominences well padded.  The left upper extremity draped over a   radiolucent bump.  Left upper extremity was prepped and draped in a sterile   fashion.  A timeout was undertaken to confirm the patient, site, surgery,   surgeon, and administration of preoperative antibiotics.  All agreed and we   proceeded.    I placed a sterile tourniquet, exsanguinated the arm, and raised the tourniquet.    I made a posterior  approach from mid humerus down over the tip of the   olecranon to the skin and subcutaneous tissues.  I dissected out medially and   laterally.  I dissected out the ulnar nerve up to the arcade of [place].  I   went back toward the elbow.  I released the ulnar nerve at the medial   epicondyle.  The patient had a good deal of early inflammatory tissue at the   base of this.  I released this down to its flexor carpi ulnaris branch and then   retracted the nerve around the medial epicondyle.  I removed any soft tissue   within the groove behind the medial epicondyle from this area.  I dissected   around onto the posterior aspect of the humerus at this point in time and   released part of the flexor pronator mass around the tip of the medial   epicondyle in order to make room for reduction and fixation.    Going on to the lateral side, I dissected between the anconeus and the triceps   around to the lateral column.  The patient had a very distal fracture.  At this   point, I took this up proximally with blunt dissection with a finger along the   lateral border in order to protect the radial nerve.  I then used electrocautery   to remove the fat-pad from the olecranon fossa.  At this point, I could see the   fracture on its apex medially and then at its very distal aspect laterally.  I   used a series of clamps to reduce this into place.  The patient had an   intra-articular split, which was nondisplaced at the trochlea.  After a series   of clamp positioning, I used several K-wires to hold the fracture into place.  I   placed a medial plate wrapping around the medial epicondyle.  I secured this   with a K-wire distally and then placed a single cortical screw in the slotted   hole proximally so that I could move the plate up and down.  I then placed an   axillary screw just above the apex of the fracture in order to act as an axilla   for the medial spike of the fracture.  I then placed a metaphyseal screw   distally  to go across the fracture line of the trochlea.  I got fairly good   purchase with this.  I then placed a free screw at the joint line from the   trochlea into the capitellum going almost bicortically.    I then switched out in the mid foot, put a lateral plate in position.  The   fracture was very distal here and my only chance of lateral fixation was to have   a 180-degree plating with screws grabbing the lateral and the medial portions   through the capitellum and the trochlea from the lateral side.  I fixed this   with K-wires in good position and then I placed a single cortical screw going   just underneath the olecranon fossa and all the way into the trochlea.  I then   placed a lock screw just beneath this.  I placed a cortical screw in the shaft   proximally and another short locked screw into the shaft proximally.  I had good   two-screw purchase through the capitellum into the trochlea with the lateral   plate.  I went back to the medial side and placed another cortical screw more   proximally and I placed a column screw going from around the tip of the medial   epicondyle up into the shaft of the bone.  I then placed another short screw   distally.    The K-wires were all removed.  I then took the elbow through range of motion and   got AP and lateral fluoroscopic views, showing good reduction and fixation.    The tourniquet was let down and hemostasis was obtained with electrocautery and   I then copiously irrigated the wounds.  I closed the fascia between the anconeus   and triceps and then I replaced the ulnar nerve in its groove behind the medial   epicondyle.  I performed a fascial sling in the area that I had decompressed   and it had plenty of room to move and I performed a fascial sling over the top   of this in 0 Vicryl suture.  I then took a tonsil and put this between the ulnar   nerve and the fascial sling to make sure that I could flex and extend the elbow   fully with the ulnar nerve being  free in this area, but not subluxing over the   medial epicondyle.  I then closed the deep fascia with 0 Vicryl suture, the   subcutaneous tissue with inverted 3-0 Vicryl sutures, and the skin with 3-0   nylon suture.  A sterile dressing was applied followed by a long arm posterior   splint.    All instrument and sponge counts were reported correct at the end of the case.    There were no complications.  The patient was returned to supine position,   extubated, awakened, and taken to the Recovery Room in a stable condition.    PLAN FOR THE PATIENT:  I will bring her back to clinic in one week and take her   out of the splint and check her incision.  At this point, I will place her into   a hinged elbow brace with about a 30 to 40 degree arc of motion, just to begin   some early range of motion as the scars are forming, take the sutures out in 2   weeks and then begin more range of motion, but we will go very gently because   this is a very distal fracture.      CHOLO  dd: 08/16/2018 18:27:37 (CDT)  td: 08/16/2018 18:53:00 (BRIGIDA)  Doc ID   #[id number]  Job ID #[id number]    CC:

## 2018-08-16 NOTE — PROGRESS NOTES
Ortho resident paged via  to notify that admission order needs to be placed in chart prior to surgery. Awaiting call back.

## 2018-08-16 NOTE — PROGRESS NOTES
Ortho resident Dr. Lemus returned page - spoke with nurse in OR 04. Notified that patient needs admission order.

## 2018-08-16 NOTE — ANESTHESIA PREPROCEDURE EVALUATION
08/16/2018  Lang DICKSON Valence is a 85 y.o., female.    Pre-operative evaluation for Procedure(s) (LRB):  ORIF, FRACTURE, HUMERUS, DISTAL, LEFT, prone, plexiglass, C- Arm, synthes (Left)    Lang DICKSON Valence is a 85 y.o. female     Patient Active Problem List   Diagnosis    Vitamin D deficiency disease    Osteoporosis, post-menopausal    Urge and stress incontinence    Atherosclerosis of aortic arch    Coronary artery disease involving native coronary artery of native heart without angina pectoris    Recurrent UTI    Hyperlipidemia    Closed supracondylar fracture of left humerus       Review of patient's allergies indicates:  No Known Allergies    No current facility-administered medications on file prior to encounter.      Current Outpatient Medications on File Prior to Encounter   Medication Sig Dispense Refill    alendronate (FOSAMAX) 70 MG tablet Take 1 tablet (70 mg total) by mouth every 7 days. 12 tablet 3    aspirin (ECOTRIN) 81 MG EC tablet Take 1 tablet (81 mg total) by mouth once daily. 90 tablet 3    ergocalciferol (VITAMIN D2) 50,000 unit Cap Take 1 capsule (50,000 Units total) by mouth every 7 days. 4 capsule 11    HYDROcodone-acetaminophen (NORCO) 5-325 mg per tablet Take 1 tablet by mouth every 4 (four) hours as needed for Pain. 18 tablet 0    multivitamin (ONE DAILY MULTIVITAMIN) per tablet Take 1 tablet by mouth once daily.         Past Surgical History:   Procedure Laterality Date    CATARACT EXTRACTION, BILATERAL      COSMETIC SURGERY         Social History     Socioeconomic History    Marital status:      Spouse name: Not on file    Number of children: 3    Years of education: Not on file    Highest education level: Not on file   Social Needs    Financial resource strain: Not on file    Food insecurity - worry: Not on file    Food insecurity - inability: Not on  file    Transportation needs - medical: Not on file    Transportation needs - non-medical: Not on file   Occupational History    Occupation: teacher - retired   Tobacco Use    Smoking status: Former Smoker     Packs/day: 0.50     Types: Cigarettes     Last attempt to quit: 1970     Years since quittin.2    Smokeless tobacco: Never Used   Substance and Sexual Activity    Alcohol use: Yes     Alcohol/week: 1.2 oz     Types: 2 Glasses of wine per week     Comment: once a week    Drug use: No    Sexual activity: No   Other Topics Concern    Not on file   Social History Narrative    Not on file         CBC: No results for input(s): WBC, RBC, HGB, HCT, PLT, MCV, MCH, MCHC in the last 72 hours.    CMP: No results for input(s): NA, K, CL, CO2, BUN, CREATININE, GLU, MG, PHOS, CALCIUM, ALBUMIN, PROT, ALKPHOS, ALT, AST, BILITOT in the last 72 hours.    INR  No results for input(s): PT, INR, PROTIME, APTT in the last 72 hours.        Diagnostic Studies:      EKG:    Normal sinus rhythm  Low voltage QRS  Borderline Abnormal ECG  When compared with ECG of 05-SEP-2017 14:20,  T wave inversion no longer evident in Lateral leads  Confirmed by TEODORA BRAGA MD ([id number] on 2018 11:50:11 AM    Referred By: RAFIQ   SELF           Confirmed By:TEODORA BRAGA MD    2D Echo:  Cardiology note: Nataly MPI 16 (on my pers rev there may be very mild apical ischemia)  The patient exercised for 3.0 minutes on a High Ramp protocol, corresponding to a functional capacity of 5 estimated METS, achieving a peak heart rate of 130 bpm, which is 98% of the age predicted maximum heart rate. -CP/EKG.  Nuclear Quantitative Functional Analysis:   LVEF: >= 70 %  Impression: NORMAL MYOCARDIAL PERFUSION  1. The perfusion scan is free of evidence for myocardial ischemia or injury.   2. Resting wall motion is physiologic.   3. Resting LV function is normal.   4. The ventricular volumes are normal at rest and stress.   5. The  extracardiac distribution of radioactivity is normal.             Anesthesia Evaluation    I have reviewed the Patient Summary Reports.     I have reviewed the Medications.     Review of Systems  Social:  Non-Smoker    Cardiovascular:   Denies MI. CAD    hyperlipidemia        Physical Exam  General:  Well nourished    Airway/Jaw/Neck:  Airway Findings: Mouth Opening: Normal General Airway Assessment: Adult  Mallampati: II  TM Distance: Normal, at least 6 cm  Jaw/Neck Findings:  Neck ROM: Normal ROM      Dental:  Dental Findings: In tact, Upper Dentures, Lower Dentures   Chest/Lungs:  Chest/Lungs Clear    Heart/Vascular:  Heart Findings: Normal       Mental Status:  Mental Status Findings:  Cooperative, Alert and Oriented         Anesthesia Plan  Type of Anesthesia, risks & benefits discussed:  Anesthesia Type:  general, regional  Patient's Preference:   Intra-op Monitoring Plan: standard ASA monitors  Intra-op Monitoring Plan Comments:   Post Op Pain Control Plan: per primary service following discharge from PACU, IV/PO Opioids PRN, multimodal analgesia and peripheral nerve block  Post Op Pain Control Plan Comments:   Induction:   IV  Beta Blocker:  Patient is not currently on a Beta-Blocker (No further documentation required).       Informed Consent: Patient understands risks and agrees with Anesthesia plan.  Questions answered. Anesthesia consent signed with patient.  ASA Score: 2     Day of Surgery Review of History & Physical:            Ready For Surgery From Anesthesia Perspective.

## 2018-08-16 NOTE — ANESTHESIA PROCEDURE NOTES
Supraclavicular Nerve Block Catheter    Patient location during procedure: pre-op   Block not for primary anesthetic.  Reason for block: at surgeon's request and post-op pain management   Post-op Pain Location: Left Humerus  Start time: 8/16/2018 11:22 AM  Timeout: 8/16/2018 11:18 AM   End time: 8/16/2018 11:48 AM  Staffing  Anesthesiologist: Erwin Mike MD  Other anesthesia staff: Pan Gilbert MD  Performed: other anesthesia staff and anesthesiologist   Preanesthetic Checklist  Completed: patient identified, site marked, surgical consent, pre-op evaluation, timeout performed, IV checked, risks and benefits discussed and monitors and equipment checked  Peripheral Block  Patient position: sitting  Prep: ChloraPrep and site prepped and draped  Patient monitoring: heart rate, cardiac monitor, continuous pulse ox, continuous capnometry and frequent blood pressure checks  Block type: supraclavicular  Laterality: left  Injection technique: single shot  Needle  Needle type: Tuohy   Needle gauge: 17 G  Needle length: 3.5 in  Needle localization: anatomical landmarks and ultrasound guidance  Catheter type: spring wound  Catheter size: 19 G  Test dose: lidocaine 1.5% with Epi 1-to-200,000 and negative   -ultrasound image captured on disc.  Assessment  Injection assessment: negative aspiration, negative parasthesia and local visualized surrounding nerve  Paresthesia pain: none  Heart rate change: no  Slow fractionated injection: yes  Additional Notes  VSS.  [facility] RN monitoring vitals throughout procedure.  Patient tolerated procedure well.

## 2018-08-16 NOTE — INTERVAL H&P NOTE
No change in H&P.  Left supracondylar distal humerus fracture.  To OR for ORIF.  The risks, benefits and alternatives to surgery were discussed with the patient at great length.  These include bleeding, infection, vessel/nerve damage, pain, numbness, tingling, complex regional pain syndrome, stiffness, hardware/surgical failure, need for further surgery, malunion, nonunion, DVT, PE, arthritis and death.  Patient states an understanding and wishes to proceed with surgery.   All questions were answered.  No guarantees were implied or stated.  Informed consent was obtained.    Sonu Carmona MD

## 2018-08-17 PROCEDURE — 25000003 PHARM REV CODE 250: Performed by: ANESTHESIOLOGY

## 2018-08-17 PROCEDURE — G8989 SELF CARE D/C STATUS: HCPCS | Mod: CK

## 2018-08-17 PROCEDURE — 25000003 PHARM REV CODE 250: Performed by: STUDENT IN AN ORGANIZED HEALTH CARE EDUCATION/TRAINING PROGRAM

## 2018-08-17 PROCEDURE — G8987 SELF CARE CURRENT STATUS: HCPCS | Mod: CK

## 2018-08-17 PROCEDURE — 97165 OT EVAL LOW COMPLEX 30 MIN: CPT

## 2018-08-17 PROCEDURE — 63600175 PHARM REV CODE 636 W HCPCS: Performed by: ANESTHESIOLOGY

## 2018-08-17 PROCEDURE — 99900035 HC TECH TIME PER 15 MIN (STAT)

## 2018-08-17 PROCEDURE — G8988 SELF CARE GOAL STATUS: HCPCS | Mod: CI

## 2018-08-17 PROCEDURE — 97530 THERAPEUTIC ACTIVITIES: CPT

## 2018-08-17 PROCEDURE — 99212 OFFICE O/P EST SF 10 MIN: CPT | Mod: ,,, | Performed by: ANESTHESIOLOGY

## 2018-08-17 PROCEDURE — 97535 SELF CARE MNGMENT TRAINING: CPT

## 2018-08-17 PROCEDURE — G0378 HOSPITAL OBSERVATION PER HR: HCPCS

## 2018-08-17 PROCEDURE — 63600175 PHARM REV CODE 636 W HCPCS: Performed by: STUDENT IN AN ORGANIZED HEALTH CARE EDUCATION/TRAINING PROGRAM

## 2018-08-17 RX ORDER — OXYCODONE HYDROCHLORIDE 5 MG/1
5 TABLET ORAL EVERY 4 HOURS PRN
Status: DISCONTINUED | OUTPATIENT
Start: 2018-08-17 | End: 2018-08-18 | Stop reason: HOSPADM

## 2018-08-17 RX ORDER — MORPHINE SULFATE 4 MG/ML
2 INJECTION, SOLUTION INTRAMUSCULAR; INTRAVENOUS
Status: DISCONTINUED | OUTPATIENT
Start: 2018-08-17 | End: 2018-08-18 | Stop reason: HOSPADM

## 2018-08-17 RX ORDER — MORPHINE SULFATE 2 MG/ML
2 INJECTION, SOLUTION INTRAMUSCULAR; INTRAVENOUS
Status: DISCONTINUED | OUTPATIENT
Start: 2018-08-17 | End: 2018-08-17

## 2018-08-17 RX ADMIN — SENNOSIDES AND DOCUSATE SODIUM 1 TABLET: 8.6; 5 TABLET ORAL at 09:08

## 2018-08-17 RX ADMIN — MUPIROCIN 1 G: 20 OINTMENT TOPICAL at 09:08

## 2018-08-17 RX ADMIN — OXYCODONE HYDROCHLORIDE 10 MG: 5 TABLET ORAL at 06:08

## 2018-08-17 RX ADMIN — ACETAMINOPHEN 1000 MG: 500 TABLET, FILM COATED ORAL at 05:08

## 2018-08-17 RX ADMIN — OXYCODONE HYDROCHLORIDE 5 MG: 5 TABLET ORAL at 10:08

## 2018-08-17 RX ADMIN — ACETAMINOPHEN 1000 MG: 500 TABLET, FILM COATED ORAL at 12:08

## 2018-08-17 RX ADMIN — ACETAMINOPHEN 1000 MG: 500 TABLET, FILM COATED ORAL at 06:08

## 2018-08-17 RX ADMIN — POLYETHYLENE GLYCOL 3350 17 G: 17 POWDER, FOR SOLUTION ORAL at 09:08

## 2018-08-17 RX ADMIN — PREGABALIN 75 MG: 75 CAPSULE ORAL at 09:08

## 2018-08-17 RX ADMIN — ROPIVACAINE HYDROCHLORIDE 6 ML/HR: 2 INJECTION, SOLUTION EPIDURAL; INFILTRATION at 08:08

## 2018-08-17 RX ADMIN — ROPIVACAINE HYDROCHLORIDE 8 ML/HR: 2 INJECTION, SOLUTION EPIDURAL; INFILTRATION at 07:08

## 2018-08-17 RX ADMIN — RAMELTEON 8 MG: 8 TABLET, FILM COATED ORAL at 10:08

## 2018-08-17 RX ADMIN — MUPIROCIN 1 G: 20 OINTMENT TOPICAL at 12:08

## 2018-08-17 NOTE — ADDENDUM NOTE
Addendum  created 08/17/18 1239 by Guerita Orr MD    Charge Capture section accepted, Sign clinical note

## 2018-08-17 NOTE — ANESTHESIA POSTPROCEDURE EVALUATION
"Anesthesia Post Evaluation    Patient: Lang Kumar    Procedure(s) Performed: Procedure(s) (LRB):  ORIF, FRACTURE, HUMERUS, DISTAL, LEFT (Left)    Final Anesthesia Type: general  Patient location during evaluation: PACU  Patient participation: Yes- Able to Participate  Level of consciousness: awake and alert and oriented  Post-procedure vital signs: reviewed and stable  Pain management: adequate  Airway patency: patent  PONV status at discharge: No PONV  Anesthetic complications: no      Cardiovascular status: hemodynamically stable  Respiratory status: unassisted, spontaneous ventilation and room air  Hydration status: euvolemic  Follow-up not needed.        Visit Vitals  BP (!) 137/59 (BP Location: Right arm, Patient Position: Lying)   Pulse 100   Temp 36.8 °C (98.3 °F) (Axillary)   Resp 16   Ht 5' 2" (1.575 m)   Wt 52.2 kg (115 lb)   SpO2 (!) 93%   Breastfeeding? No   BMI 21.03 kg/m²       Pain/Kanu Score: Pain Assessment Performed: Yes (8/16/2018  9:10 PM)  Presence of Pain: complains of pain/discomfort (8/16/2018  9:10 PM)  Pain Rating Prior to Med Admin: 4 (8/17/2018  8:35 AM)  Kanu Score: 10 (8/16/2018  8:00 PM)        "

## 2018-08-17 NOTE — PROGRESS NOTES
Report called to Margoth RN, pt made ready for transport to [facility] via stretcher per PCT, pt resting quietly,VSS per monitor,resp unlabored, AA&Ox3 with intermittent confusion, requires frequent reorientation, dtr at bedside,  states pts base line intermittently confused, left arm with cast and sling intact, wiggles fingers freely, warm to touch, cap refill<3 sec, perineural intact,stable at present.

## 2018-08-17 NOTE — PT/OT/SLP EVAL
Occupational Therapy   Evaluation    Name: Lang Kumar  MRN: [id number]  Admitting Diagnosis:  Closed bicondylar fracture of distal humerus 1 Day Post-Op    Recommendations:     Discharge Recommendations: home health OT  Discharge Equipment Recommendations:  none  Barriers to discharge:  None    History:     Occupational Profile:  Living Environment: Pt lives in a one story house c no PEGGY.  Has a walk-in shower.  Previous level of function: I PTA  Roles and Routines: N/A  Equipment Used at Home:  none  Assistance upon Discharge: Son, daughter, and sister    Past Medical History:   Diagnosis Date    Abnormal exam or test finding 12/2011    coronary calcium score 204 = moderate plaque burden and high coronary heart disease risk    Arthritis     Atherosclerosis of aortic arch     - noted on CXR 11/2016    Borderline hyperlipidemia     Coronary artery disease involving native coronary artery of native heart without angina pectoris 11/30/2016    Osteoporosis, post-menopausal     Urge and stress incontinence     uses pessary; followed by gynecology    Uterine prolapse     Vitamin D deficiency disease        Past Surgical History:   Procedure Laterality Date    CATARACT EXTRACTION, BILATERAL      COSMETIC SURGERY         Subjective     Chief Complaint: Pt is s/p ORIF L distal humerus which is being treated in splint.  Patient/Family Comments/goals: To get better.    Pain/Comfort:  · Pain Rating 1: 0/10    Patients cultural, spiritual, [employer] conflicts given the current situation: None    Objective:     Communicated with: RN prior to session.  Patient found all lines intact, call button in reach, RN notified and family present and perineural catheter upon OT entry to room.    General Precautions: Standard, fall   Orthopedic Precautions:LUE non weight bearing(Only cleared for finger AROM in L UE)   Braces: UE Sling(Splint L UE)     Occupational Performance:    Bed Mobility:    · Patient completed Supine to  "Sit with minimum assistance    Functional Mobility/Transfers:  · Patient completed Sit <> Stand Transfer with contact guard assistance  with  no assistive device   · Patient completed Bed <> Chair Transfer using Stand Pivot technique with contact guard assistance with no assistive device  · Patient completed Toilet Transfer Stand Pivot technique with contact guard assistance with  no AD  · Functional Mobility: Pt was able to walk to bathroom c CGA.    Activities of Daily Living:  · Upper Body Dressing: maximal assistance to don hospital gown.    Cognitive/Visual Perceptual:  Cognitive/Psychosocial Skills:     -       Oriented to: Person, Place, Time and Situation   -       Follows Commands/attention:Follows multistep  commands    Physical Exam:  Dominant hand:    -       RH  Upper Extremity Range of Motion:     -       Right Upper Extremity: WFL   Upper Extremity Strength:    -       Right Upper Extremity: WFL  -       Left Upper Extremity: NT     AMPAC 6 Click ADL:  AMPAC Total Score: 19    Treatment & Education:  Educated pt and family on sling management.  Education:    Patient left up in chair with all lines intact, call button in reach, RN notified and family present    Assessment:     Lang Kumar is a 85 y.o. female with a medical diagnosis of Closed bicondylar fracture of distal humerus.  She presents with the following performance deficits affecting function: weakness, impaired self care skills, impaired functional mobilty, impaired balance, orthopedic precautions, decreased upper extremity function, decreased ROM, impaired fine motor, impaired coordination.  Pt was able to perform supine/sit, sit/stand, toilet, and bed/chair T/F c CGA.  Able to perform UB dressing c max A.  Has 3-/5 L IP strength.    Rehab Prognosis: Good; patient would benefit from acute skilled OT services to address these deficits and reach maximum level of function.         Clinical Decision Makin.  OT Mod:  "Pt evaluation " "falls under moderate complexity for evaluation coding due to identification of 3-5 performance deficits noted as stated above. Eval required Min/Mod assistance to complete on this date and detailed assessment(s) were utilized. Moreover, an expanded review of history and occupational profile obtained with additional review of cognitive, physical and psychosocial hx."     Plan:     Patient to be seen 5 x/week to address the above listed problems via self-care/home management, therapeutic activities, therapeutic exercises  · Plan of Care Expires:    · Plan of Care Reviewed with: patient, family    This Plan of care has been discussed with the patient who was involved in its development and understands and is in agreement with the identified goals and treatment plan    GOALS:   Multidisciplinary Problems     Occupational Therapy Goals        Problem: Occupational Therapy Goal    Goal Priority Disciplines Outcome Interventions   Occupational Therapy Goal     OT, PT/OT     Description:  Goals to be met by: 8/24/18     Patient will increase functional independence with ADLs by performing:    UE Dressing with Modified [place].  LE Dressing with Modified [place].  Grooming while standing at sink with Modified [place].  Toileting from bedside commode with Modified [place] for hygiene and clothing management.   Bathing from  edge of bed with Modified [place].  Toilet transfer to bedside commode with Modified [place].  Increased functional strength to WFL for L hand.  Upper extremity exercise program x15 reps per handout, with assistance as needed.                      Time Tracking:     OT Date of Treatment: 08/17/18  OT Start Time: 0938  OT Stop Time: 1014  OT Total Time (min): 36 min    Billable Minutes:Evaluation 10  Self Care/Home Management 13  Therapeutic Activity 13    VIKTOR Ibarra  8/17/2018    "

## 2018-08-17 NOTE — PLAN OF CARE
Problem: Occupational Therapy Goal  Goal: Occupational Therapy Goal  Goals to be met by: 8/24/18     Patient will increase functional independence with ADLs by performing:    UE Dressing with Modified [place].  LE Dressing with Modified [place].  Grooming while standing at sink with Modified [place].  Toileting from bedside commode with Modified [place] for hygiene and clothing management.   Bathing from  edge of bed with Modified [place].  Toilet transfer to bedside commode with Modified [place].  Increased functional strength to WFL for L hand.  Upper extremity exercise program x15 reps per handout, with assistance as needed.    POC initiated.

## 2018-08-17 NOTE — ANESTHESIA POST-OP PAIN MANAGEMENT
Acute Pain Service Progress Note    HPI:  Lang Kumar is a 85 y.o., female with osteoporosis.    Interval history:  NAEON. Reports moderate to severe pain. PNC bolused and rate increased this morning.    Surgery:   ORIF, FRACTURE, HUMERUS, DISTAL, LEFT (Left Arm Upper)     Post Op Day #: 1    Catheter type: perineural  supraclavicular    Infusion type: Ropivacaine 0.2%  6 mL/hr basal    Problem List:    Active Hospital Problems    Diagnosis  POA    *Closed bicondylar fracture of distal humerus [S42.493A]  Yes      Resolved Hospital Problems   No resolved problems to display.       Subjective:     General appearance of alert, oriented, complaining of left shoulder pain   Pain with rest: 8    Numbers   Pain with movement: 10    Numbers   Side Effects    1. Pruritis No    2. Nausea No    3. Motor Blockade No    4. Sedation No, 1=awake and alert    Objective:     Catheter site clean, dry, intact         Vitals   Vitals:    08/17/18 0832   BP: (!) 137/59   Pulse: 100   Resp: 16   Temp: 36.8 °C (98.3 °F)        Labs    Admission on 08/16/2018   Component Date Value Ref Range Status    Sodium 08/16/2018 137  136 - 145 mmol/L Final    Potassium 08/16/2018 3.7  3.5 - 5.1 mmol/L Final    Chloride 08/16/2018 106  95 - 110 mmol/L Final    CO2 08/16/2018 19* 23 - 29 mmol/L Final    Glucose 08/16/2018 149* 70 - 110 mg/dL Final    BUN, Bld 08/16/2018 14  8 - 23 mg/dL Final    Creatinine 08/16/2018 0.7  0.5 - 1.4 mg/dL Final    Calcium 08/16/2018 8.4* 8.7 - 10.5 mg/dL Final    Total Protein 08/16/2018 6.8  6.0 - 8.4 g/dL Final    Albumin 08/16/2018 3.4* 3.5 - 5.2 g/dL Final    Total Bilirubin 08/16/2018 0.9  0.1 - 1.0 mg/dL Final    Alkaline Phosphatase 08/16/2018 59  55 - 135 U/L Final    AST 08/16/2018 22  10 - 40 U/L Final    ALT 08/16/2018 14  10 - 44 U/L Final    Anion Gap 08/16/2018 12  8 - 16 mmol/L Final    eGFR if African American 08/16/2018 >60.0  >60 mL/min/1.73 m^2 Final    eGFR if non African  American 08/16/2018 >60.0  >60 mL/min/1.73 m^2 Final    WBC 08/16/2018 14.84* 3.90 - 12.70 K/uL Final    RBC 08/16/2018 4.00  4.00 - 5.40 M/uL Final    Hemoglobin 08/16/2018 12.7  12.0 - 16.0 g/dL Final    Hematocrit 08/16/2018 40.5  37.0 - 48.5 % Final    MCV 08/16/2018 101* 82 - 98 fL Final    MCH 08/16/2018 31.8* 27.0 - 31.0 pg Final    MCHC 08/16/2018 31.4* 32.0 - 36.0 g/dL Final    RDW 08/16/2018 12.3  11.5 - 14.5 % Final    Platelets 08/16/2018 223  150 - 350 K/uL Final    MPV 08/16/2018 10.2  9.2 - 12.9 fL Final    Immature Granulocytes 08/16/2018 0.5  0.0 - 0.5 % Final    Gran # (ANC) 08/16/2018 13.6* 1.8 - 7.7 K/uL Final    Immature Grans (Abs) 08/16/2018 0.08* 0.00 - 0.04 K/uL Final    Lymph # 08/16/2018 0.6* 1.0 - 4.8 K/uL Final    Mono # 08/16/2018 0.6  0.3 - 1.0 K/uL Final    Eos # 08/16/2018 0.0  0.0 - 0.5 K/uL Final    Baso # 08/16/2018 0.04  0.00 - 0.20 K/uL Final    nRBC 08/16/2018 0  0 /100 WBC Final    Gran% 08/16/2018 91.4* 38.0 - 73.0 % Final    Lymph% 08/16/2018 3.8* 18.0 - 48.0 % Final    Mono% 08/16/2018 4.0  4.0 - 15.0 % Final    Eosinophil% 08/16/2018 0.0  0.0 - 8.0 % Final    Basophil% 08/16/2018 0.3  0.0 - 1.9 % Final    Differential Method 08/16/2018 Automated   Final        Meds   Current Facility-Administered Medications   Medication Dose Route Frequency Provider Last Rate Last Dose    0.9%  NaCl infusion   Intravenous Continuous Ghalia Al-Busayyes, MD   Stopped at 08/16/18 1733    acetaminophen tablet 1,000 mg  1,000 mg Oral Q6H Dennis Jaramillo MD   1,000 mg at 08/17/18 0628    morphine injection 2 mg  2 mg Intravenous Q2H PRN Sonu Carmona MD        mupirocin 2 % ointment 1 g  1 g Nasal BID Erick Rene MD   1 g at 08/16/18 2017    ondansetron disintegrating tablet 8 mg  8 mg Oral Q8H PRN Erick Rene MD        ondansetron injection 4 mg  4 mg Intravenous Q12H PRN Dennis Jaramillo MD        oxyCODONE immediate release tablet 5 mg   5 mg Oral Q4H PRN Nikhil Elise MD        polyethylene glycol packet 17 g  17 g Oral Daily Erick Rene MD   17 g at 08/17/18 0917    pregabalin capsule 75 mg  75 mg Oral QHS Dennis Jaramillo MD   75 mg at 08/16/18 2017    promethazine (PHENERGAN) 6.25 mg in dextrose 5 % 50 mL IVPB  6.25 mg Intravenous Q6H PRN Dennis Jaramillo MD        ramelteon tablet 8 mg  8 mg Oral Nightly PRN Erick Rene MD   8 mg at 08/16/18 2225    ropivacaine (PF) 2 mg/ml (0.2%) infusion  8 mL/hr Perineural Continuous Nikhil Elise MD 6 mL/hr at 08/17/18 0835 6 mL/hr at 08/17/18 0835    senna-docusate 8.6-50 mg per tablet 1 tablet  1 tablet Oral BID Erick Rene MD   1 tablet at 08/17/18 0917    sodium chloride 0.9% flush 3 mL  3 mL Intravenous PRN Chelsie Garay MD        sodium chloride 0.9% flush 3 mL  3 mL Intravenous PRN Erick Lynch MD        sodium chloride 0.9% flush 3 mL  3 mL Intravenous PRN Erick Rene MD        sodium chloride 0.9% flush 5 mL  5 mL Intravenous PRN Erick Rene MD            Anticoagulant dose none.    Assessment:    Pain control inadequate    Plan:    Modify present therapy to improve control of pain . PNC bolused today. Rate increased to 8 mL/hr. Multimodal pain regimen with acetaminophen, pregabalin, and PRN oxycodone.        Nikhil Elise MD  Anesthesiology, Acute Pain Service  [facility]  SpectraLink # [id number]    I have reviewed and concur with the resident's history, physical, assessment, and plan.  I have personally interviewed and examined the patient at bedside.  See below addendum for my evaluation and additional findings.    Patient having some pain this am - bolused PNC and will follow up this afternoon - family at bedside and states patient is confused (but she was confused pre-op at home as well) - she is an OnQ candidate but unsure of discharge time - continue PNC for now.

## 2018-08-17 NOTE — PLAN OF CARE
Patient requested [facility]. HH referral sent to [facility] via [facility].     [facility] added to patient's AVS.    Karen Herrera RN, CM [facility]  [phone]

## 2018-08-17 NOTE — PLAN OF CARE
POD 1 s/p ORIF left distal humerus fracture. PT/OT ordered to eval and treat. PT/OT recommended HH and DME. Patient's family is present at the bedside. Patient has good family support. CM completed discharge assessment and planning with patient and family. Patient and family verbalized understanding. All questions and concerns addressed. SW and CM will continue to follow for any additional needs. Plan A to discharge home with home health as soon as medically stable. Plan B to discharge home with family support and plans for outpatient rehab.    Patient requested [facility].    PCP: Moon Mccallum MD    Pharmacy:   [facility]/pharmacy #[id number] - [place], [place] - [address]  [address] [address]  Phone: [phone] Fax: [phone]    Payor: HU[employer] MANAGED MEDICARE / Plan: HU[employer] MEDICARE HMO / Product Type: Capitation /      08/17/18 1150   Discharge Assessment   Assessment Type Discharge Planning Assessment   Confirmed/corrected address and phone number on facesheet? Yes   Assessment information obtained from? Patient;Caregiver;Medical Record   Expected Length of Stay (days) 3   Communicated expected length of stay with patient/caregiver yes   Prior to hospitilization cognitive status: Alert/Oriented   Prior to hospitalization functional status: Independent   Current cognitive status: Alert/Oriented   Current Functional Status: Assistive Equipment   Able to Return to Prior Arrangements yes   Is patient able to care for self after discharge? Yes   Who are your caregiver(s) and their phone number(s)? daughter- Bharti Aguilera- [phone]; daughter- Bree Rodriguez [phone]   Patient's perception of discharge disposition home health   Readmission Within The Last 30 Days no previous admission in last 30 days   Patient currently being followed by outpatient case management? No   Patient currently receives any other outside agency services? No   Equipment Currently Used at Home none   Do  you have any problems affording any of your prescribed medications? No   Is the patient taking medications as prescribed? yes   Does the patient have transportation home? Yes   Transportation Available family or friend will provide   Does the patient receive services at [facility]? Yes   Discharge Plan A Home Health;Home with family   Discharge Plan B Home with family;Other  (outpatient rehab)   Patient/Family In Agreement With Plan yes

## 2018-08-17 NOTE — PLAN OF CARE
CM ordered a leno-walker and bedside commode from [facility] with [facility]. DME for bedside delivery.    CM notified patient does not qualify for a leno-walker (no paralysis on one side). Patient qualifies for a rolling walker. CM ordered a rolling walker for bedside delivery.    Karen Herrera RN, CM [facility]  [phone] -[phone]

## 2018-08-18 VITALS
DIASTOLIC BLOOD PRESSURE: 56 MMHG | HEIGHT: 62 IN | OXYGEN SATURATION: 97 % | WEIGHT: 115.06 LBS | SYSTOLIC BLOOD PRESSURE: 123 MMHG | TEMPERATURE: 99 F | RESPIRATION RATE: 18 BRPM | BODY MASS INDEX: 21.17 KG/M2 | HEART RATE: 85 BPM

## 2018-08-18 PROCEDURE — 25000003 PHARM REV CODE 250: Performed by: STUDENT IN AN ORGANIZED HEALTH CARE EDUCATION/TRAINING PROGRAM

## 2018-08-18 PROCEDURE — G0378 HOSPITAL OBSERVATION PER HR: HCPCS

## 2018-08-18 PROCEDURE — 63600175 PHARM REV CODE 636 W HCPCS: Performed by: STUDENT IN AN ORGANIZED HEALTH CARE EDUCATION/TRAINING PROGRAM

## 2018-08-18 PROCEDURE — G8989 SELF CARE D/C STATUS: HCPCS | Mod: CK

## 2018-08-18 PROCEDURE — 97535 SELF CARE MNGMENT TRAINING: CPT | Mod: 59

## 2018-08-18 PROCEDURE — G8987 SELF CARE CURRENT STATUS: HCPCS | Mod: CK

## 2018-08-18 PROCEDURE — 97530 THERAPEUTIC ACTIVITIES: CPT

## 2018-08-18 PROCEDURE — G8988 SELF CARE GOAL STATUS: HCPCS | Mod: CI

## 2018-08-18 PROCEDURE — 25000003 PHARM REV CODE 250: Performed by: ANESTHESIOLOGY

## 2018-08-18 RX ORDER — ACETAMINOPHEN 500 MG
1000 TABLET ORAL EVERY 6 HOURS
Status: DISCONTINUED | OUTPATIENT
Start: 2018-08-18 | End: 2018-08-18 | Stop reason: HOSPADM

## 2018-08-18 RX ADMIN — ACETAMINOPHEN 1000 MG: 500 TABLET, FILM COATED ORAL at 12:08

## 2018-08-18 RX ADMIN — ACETAMINOPHEN 1000 MG: 500 TABLET, FILM COATED ORAL at 06:08

## 2018-08-18 RX ADMIN — MUPIROCIN 1 G: 20 OINTMENT TOPICAL at 09:08

## 2018-08-18 RX ADMIN — POLYETHYLENE GLYCOL 3350 17 G: 17 POWDER, FOR SOLUTION ORAL at 09:08

## 2018-08-18 RX ADMIN — ROPIVACAINE HYDROCHLORIDE 8 ML/HR: 2 INJECTION, SOLUTION EPIDURAL; INFILTRATION at 06:08

## 2018-08-18 RX ADMIN — OXYCODONE HYDROCHLORIDE 5 MG: 5 TABLET ORAL at 06:08

## 2018-08-18 RX ADMIN — OXYCODONE HYDROCHLORIDE 5 MG: 5 TABLET ORAL at 03:08

## 2018-08-18 RX ADMIN — SENNOSIDES AND DOCUSATE SODIUM 1 TABLET: 8.6; 5 TABLET ORAL at 09:08

## 2018-08-18 NOTE — ASSESSMENT & PLAN NOTE
Lang Kumar is a 85 y.o. female s/p ORIF of L distal humerus on 8/16/18       - Weight bearing status: NWB LUE  - Pain control: Per APS  - Antibiotics: post op given  - DVT Prophylaxis: mechanical , SCD's at all times when not ambulating.  - PT/OT  - Dispo: home today

## 2018-08-18 NOTE — ANESTHESIA POST-OP PAIN MANAGEMENT
Acute Pain Service Progress Note    HPI:  Lang Kumar is a 85 y.o., female with osteoporosis.    Interval history:  Improved pain control s/p bolus yesterday. Still some discomfort at rest. States pain improves when she changes UE position.     Surgery:   ORIF, FRACTURE, HUMERUS, DISTAL, LEFT (Left Arm Upper)     Post Op Day #: 2    Catheter type: perineural  supraclavicular    Infusion type: Ropivacaine 0.2%  8 mL/hr basal    Problem List:    Active Hospital Problems    Diagnosis  POA    *Closed bicondylar fracture of distal humerus [S42.493A]  Yes      Resolved Hospital Problems   No resolved problems to display.       Subjective:     General appearance of alert, oriented, complaining of left shoulder pain   Pain with rest: 6    Numbers   Pain with movement: 5    Numbers   Side Effects    1. Pruritis No    2. Nausea No    3. Motor Blockade No    4. Sedation No, 1=awake and alert    Objective:     Catheter site clean, dry, intact         Vitals   Vitals:    08/18/18 0500   BP: (!) 148/58   Pulse: 83   Resp: 16   Temp: 36.7 °C (98 °F)        Labs    Admission on 08/16/2018   Component Date Value Ref Range Status    Sodium 08/16/2018 137  136 - 145 mmol/L Final    Potassium 08/16/2018 3.7  3.5 - 5.1 mmol/L Final    Chloride 08/16/2018 106  95 - 110 mmol/L Final    CO2 08/16/2018 19* 23 - 29 mmol/L Final    Glucose 08/16/2018 149* 70 - 110 mg/dL Final    BUN, Bld 08/16/2018 14  8 - 23 mg/dL Final    Creatinine 08/16/2018 0.7  0.5 - 1.4 mg/dL Final    Calcium 08/16/2018 8.4* 8.7 - 10.5 mg/dL Final    Total Protein 08/16/2018 6.8  6.0 - 8.4 g/dL Final    Albumin 08/16/2018 3.4* 3.5 - 5.2 g/dL Final    Total Bilirubin 08/16/2018 0.9  0.1 - 1.0 mg/dL Final    Alkaline Phosphatase 08/16/2018 59  55 - 135 U/L Final    AST 08/16/2018 22  10 - 40 U/L Final    ALT 08/16/2018 14  10 - 44 U/L Final    Anion Gap 08/16/2018 12  8 - 16 mmol/L Final    eGFR if African American 08/16/2018 >60.0  >60 mL/min/1.73  m^2 Final    eGFR if non African American 08/16/2018 >60.0  >60 mL/min/1.73 m^2 Final    WBC 08/16/2018 14.84* 3.90 - 12.70 K/uL Final    RBC 08/16/2018 4.00  4.00 - 5.40 M/uL Final    Hemoglobin 08/16/2018 12.7  12.0 - 16.0 g/dL Final    Hematocrit 08/16/2018 40.5  37.0 - 48.5 % Final    MCV 08/16/2018 101* 82 - 98 fL Final    MCH 08/16/2018 31.8* 27.0 - 31.0 pg Final    MCHC 08/16/2018 31.4* 32.0 - 36.0 g/dL Final    RDW 08/16/2018 12.3  11.5 - 14.5 % Final    Platelets 08/16/2018 223  150 - 350 K/uL Final    MPV 08/16/2018 10.2  9.2 - 12.9 fL Final    Immature Granulocytes 08/16/2018 0.5  0.0 - 0.5 % Final    Gran # (ANC) 08/16/2018 13.6* 1.8 - 7.7 K/uL Final    Immature Grans (Abs) 08/16/2018 0.08* 0.00 - 0.04 K/uL Final    Lymph # 08/16/2018 0.6* 1.0 - 4.8 K/uL Final    Mono # 08/16/2018 0.6  0.3 - 1.0 K/uL Final    Eos # 08/16/2018 0.0  0.0 - 0.5 K/uL Final    Baso # 08/16/2018 0.04  0.00 - 0.20 K/uL Final    nRBC 08/16/2018 0  0 /100 WBC Final    Gran% 08/16/2018 91.4* 38.0 - 73.0 % Final    Lymph% 08/16/2018 3.8* 18.0 - 48.0 % Final    Mono% 08/16/2018 4.0  4.0 - 15.0 % Final    Eosinophil% 08/16/2018 0.0  0.0 - 8.0 % Final    Basophil% 08/16/2018 0.3  0.0 - 1.9 % Final    Differential Method 08/16/2018 Automated   Final        Meds   Current Facility-Administered Medications   Medication Dose Route Frequency Provider Last Rate Last Dose    0.9%  NaCl infusion   Intravenous Continuous Chelsie Garay MD   Stopped at 08/16/18 1733    morphine injection 2 mg  2 mg Intravenous Q2H PRN Sonu Carmona MD        mupirocin 2 % ointment 1 g  1 g Nasal BID Erick Rene MD   1 g at 08/17/18 2103    ondansetron disintegrating tablet 8 mg  8 mg Oral Q8H PRN Erick Rene MD        ondansetron injection 4 mg  4 mg Intravenous Q12H PRN Dennis Jaramillo MD        oxyCODONE immediate release tablet 5 mg  5 mg Oral Q4H PRN Nikhil Elise MD   5 mg at 08/18/18 0606     polyethylene glycol packet 17 g  17 g Oral Daily Erick Rene MD   17 g at 08/17/18 0917    pregabalin capsule 75 mg  75 mg Oral QHS Dennis Jaramillo MD   75 mg at 08/17/18 2100    promethazine (PHENERGAN) 6.25 mg in dextrose 5 % 50 mL IVPB  6.25 mg Intravenous Q6H PRN Dennis Jaramillo MD        ramelteon tablet 8 mg  8 mg Oral Nightly PRN Erick Rene MD   8 mg at 08/17/18 2205    ropivacaine (PF) 2 mg/ml (0.2%) infusion  8 mL/hr Perineural Continuous Nikhil Elise MD 8 mL/hr at 08/18/18 0640 8 mL/hr at 08/18/18 0640    senna-docusate 8.6-50 mg per tablet 1 tablet  1 tablet Oral BID Erick Rene MD   1 tablet at 08/17/18 2100    sodium chloride 0.9% flush 3 mL  3 mL Intravenous PRN Chelsie Garay MD        sodium chloride 0.9% flush 3 mL  3 mL Intravenous PRN Erick Lynch MD        sodium chloride 0.9% flush 3 mL  3 mL Intravenous PRN Erick Rene MD        sodium chloride 0.9% flush 5 mL  5 mL Intravenous PRN Erick Rene MD            Anticoagulant dose none.    Assessment:    Pain control inadequate    Plan:    Modify present therapy to improve control of pain Clinician bolus of 7cc of 0.2% Ropivacaine from pump given at bedisde this AM. Will reassess.   - Multimodal pain regimen with acetaminophen, pregabalin, and PRN oxycodone.  - Possible discharge home today. Will d/c with ON-Q and PNC if so.    Dennis Jaramillo MD   Anesthesiology CA-3

## 2018-08-18 NOTE — PT/OT/SLP PROGRESS
Occupational Therapy   Treatment    Name: Lang Kumar  MRN: [id number]  Admitting Diagnosis:  Closed bicondylar fracture of distal humerus  2 Days Post-Op    Recommendations:     Discharge Recommendations: home health OT  Discharge Equipment Recommendations:  cane, quad  Barriers to discharge:  None    Subjective     Communicated with: RN prior to session.  Pain/Comfort:  · Pain Rating 1: 0/10  · Pain Rating Post-Intervention 1: 0/10    Patients cultural, spiritual, Quaker conflicts given the current situation: none stated     Objective:     Patient found with: perineural catheter    General Precautions: Standard, fall   Orthopedic Precautions:LUE non weight bearing(only cleared for finger AROM in L hand )   Braces: UE Sling(L UE splint)     Occupational Performance:    Bed Mobility:    · Patient completed Scooting/Bridging with stand by assistance  · Patient completed Supine to Sit with supervision     Functional Mobility/Transfers:  · Patient completed Sit <> Stand Transfer with contact guard assistance  with  hand-held assist   · Patient completed Bed <> Chair Transfer using Step Transfer technique with supervision with no assistive device  · Functional Mobility: Pt ambulated ~160ft w/ HHA and no AD. No signs of LOB or SOB noted.     Activities of Daily Living:  · Lower Body Dressing: minimum assistance to thread B feet into pant legs then required SBA to pull underwear BLEs and over hips     Patient left up in chair with all lines intact, call button in reach and daughters present    [facility] 6 Click: Self-care  [facility] Total Score: 19    Treatment & Education:  - OT POC  - Importance of OOB activity to maximize recovery   - Reviewed LUE NWB status   - Re-ed on AROM of L digits to prevent joint stiffness and decrease swelling  - Educated pt and daughters on UBD technique   - Educated pt and daughters on utilizing a cane for mobility to increase safe mobility   Education:    Assessment:     Lang Kumar is  a 85 y.o. female with a medical diagnosis of Closed bicondylar fracture of distal humerus.  She presents with the following.  performance deficits affecting function: weakness, impaired endurance, impaired self care skills, impaired balance, impaired functional mobilty, gait instability, decreased upper extremity function, decreased ROM, impaired skin, impaired coordination, orthopedic precautions, impaired fine motor.      Pt tolerated session well. Pt continues to progress with therapy towards achieving her functional goals. Pt demonstrated increase in self-care skills by completing LB dressing task unilaterally w/ SBA in standing. Pt continues to require HHA with mobility 2/2 slightly impaired balance, educated pt on benefits of utilizing a cane for safe mobility. Pt demonstrated and verbalized good understanding of importance to adhere to AROM exercises for L digits. Pt continues to benefit from skilled OT to address the above listed impairments.     Rehab Prognosis:  Good; patient would benefit from acute skilled OT services to address these deficits and reach maximum level of function.       Plan:     Patient to be seen 5 x/week to address the above listed problems via self-care/home management, therapeutic activities, therapeutic exercises  · Plan of Care Expires:    · Plan of Care Reviewed with: patient    This Plan of care has been discussed with the patient who was involved in its development and understands and is in agreement with the identified goals and treatment plan    GOALS:   Multidisciplinary Problems     Occupational Therapy Goals        Problem: Occupational Therapy Goal    Goal Priority Disciplines Outcome Interventions   Occupational Therapy Goal     OT, PT/OT Ongoing (interventions implemented as appropriate)    Description:  Goals to be met by: 8/24/18     Patient will increase functional independence with ADLs by performing:    UE Dressing with Modified [place].  LE Dressing with  Modified [place].  Grooming while standing at sink with Modified [place].  Toileting from bedside commode with Modified [place] for hygiene and clothing management.   Bathing from  edge of bed with Modified [place].  Toilet transfer to bedside commode with Modified [place].  Increased functional strength to WFL for L hand.  Upper extremity exercise program x15 reps per handout, with assistance as needed.                      Time Tracking:     OT Date of Treatment: 08/18/18  OT Start Time: 1036  OT Stop Time: 1103  OT Total Time (min): 27 min    Billable Minutes:Self Care/Home Management 17  Therapeutic Activity 10    Shawnee Hernandez, OT  8/18/2018

## 2018-08-18 NOTE — ADDENDUM NOTE
Addendum  created 08/18/18 1211 by Dennis Jaramillo MD    Order list changed, Order sets accessed

## 2018-08-18 NOTE — ANESTHESIA POST-OP PAIN MANAGEMENT
Patient with improved pain afer AM bolus. Plan for d/c today. On-Q connected at the bedside. On examination L hand significantly more swollen than R. Possibly progressed from earlier assessment. Discussed with family at the bedside and RN. Surgical eval prior to d/c.

## 2018-08-18 NOTE — SUBJECTIVE & OBJECTIVE
"Principal Problem:Closed bicondylar fracture of distal humerus    Principal Orthopedic Problem: Same    Interval History: Patient seen and examined at bedside.  No acute events overnight.  Pain controlled.  Did well with PT yesterday.  Patient is very ready to go home today.      Review of patient's allergies indicates:  No Known Allergies    Current Facility-Administered Medications   Medication    0.9%  NaCl infusion    acetaminophen tablet 1,000 mg    morphine injection 2 mg    mupirocin 2 % ointment 1 g    ondansetron disintegrating tablet 8 mg    ondansetron injection 4 mg    oxyCODONE immediate release tablet 5 mg    polyethylene glycol packet 17 g    pregabalin capsule 75 mg    promethazine (PHENERGAN) 6.25 mg in dextrose 5 % 50 mL IVPB    ramelteon tablet 8 mg    ropivacaine (PF) 2 mg/ml (0.2%) infusion    senna-docusate 8.6-50 mg per tablet 1 tablet    sodium chloride 0.9% flush 3 mL    sodium chloride 0.9% flush 3 mL    sodium chloride 0.9% flush 3 mL    sodium chloride 0.9% flush 5 mL     Objective:     Vital Signs (Most Recent):  Temp: 97.6 °F (36.4 °C) (08/18/18 0913)  Pulse: 92 (08/18/18 0913)  Resp: 16 (08/18/18 0913)  BP: 133/62 (08/18/18 0913)  SpO2: (!) 94 % (08/18/18 0913) Vital Signs (24h Range):  Temp:  [96.1 °F (35.6 °C)-99 °F (37.2 °C)] 97.6 °F (36.4 °C)  Pulse:  [] 92  Resp:  [16-20] 16  SpO2:  [94 %-98 %] 94 %  BP: (128-155)/(58-72) 133/62     Weight: 52.2 kg (115 lb)  Height: 5' 2" (157.5 cm)  Body mass index is 21.03 kg/m².      Intake/Output Summary (Last 24 hours) at 8/18/2018 1023  Last data filed at 8/17/2018 1800  Gross per 24 hour   Intake --   Output 350 ml   Net -350 ml       Ortho/SPM Exam    Physical Exam:  NAD, A/O x 3.  Wound c/d/i with clean dressing.  No focal motor or sensory deficits noted.      Significant Labs: All pertinent labs within the past 24 hours have been reviewed.    Significant Imaging: I have reviewed and interpreted all pertinent " imaging results/findings.

## 2018-08-18 NOTE — PROGRESS NOTES
"[facility]  Orthopedics  Progress Note    Patient Name: Lang Kumar  MRN: [id number]  Admission Date: 8/16/2018  Hospital Length of Stay: 1 days  Attending Provider: Sonu Carmona MD  Primary Care Provider: Moon Mccallum MD  Follow-up For: Procedure(s) (LRB):  ORIF, FRACTURE, HUMERUS, DISTAL, LEFT (Left)    Post-Operative Day: 2 Days Post-Op  Subjective:     Principal Problem:Closed bicondylar fracture of distal humerus    Principal Orthopedic Problem: Same    Interval History: Patient seen and examined at bedside.  No acute events overnight.  Pain controlled.  Did well with PT yesterday.  Patient is very ready to go home today.      Review of patient's allergies indicates:  No Known Allergies    Current Facility-Administered Medications   Medication    0.9%  NaCl infusion    acetaminophen tablet 1,000 mg    morphine injection 2 mg    mupirocin 2 % ointment 1 g    ondansetron disintegrating tablet 8 mg    ondansetron injection 4 mg    oxyCODONE immediate release tablet 5 mg    polyethylene glycol packet 17 g    pregabalin capsule 75 mg    promethazine (PHENERGAN) 6.25 mg in dextrose 5 % 50 mL IVPB    ramelteon tablet 8 mg    ropivacaine (PF) 2 mg/ml (0.2%) infusion    senna-docusate 8.6-50 mg per tablet 1 tablet    sodium chloride 0.9% flush 3 mL    sodium chloride 0.9% flush 3 mL    sodium chloride 0.9% flush 3 mL    sodium chloride 0.9% flush 5 mL     Objective:     Vital Signs (Most Recent):  Temp: 97.6 °F (36.4 °C) (08/18/18 0913)  Pulse: 92 (08/18/18 0913)  Resp: 16 (08/18/18 0913)  BP: 133/62 (08/18/18 0913)  SpO2: (!) 94 % (08/18/18 0913) Vital Signs (24h Range):  Temp:  [96.1 °F (35.6 °C)-99 °F (37.2 °C)] 97.6 °F (36.4 °C)  Pulse:  [] 92  Resp:  [16-20] 16  SpO2:  [94 %-98 %] 94 %  BP: (128-155)/(58-72) 133/62     Weight: 52.2 kg (115 lb)  Height: 5' 2" (157.5 cm)  Body mass index is 21.03 kg/m².      Intake/Output Summary (Last 24 hours) at 8/18/2018 " 1023  Last data filed at 8/17/2018 1800  Gross per 24 hour   Intake --   Output 350 ml   Net -350 ml       Ortho/SPM Exam    Physical Exam:  NAD, A/O x 3.  Wound c/d/i with clean dressing.  No focal motor or sensory deficits noted.      Significant Labs: All pertinent labs within the past 24 hours have been reviewed.    Significant Imaging: I have reviewed and interpreted all pertinent imaging results/findings.    Assessment/Plan:     * Closed bicondylar fracture of distal humerus    Lang Kmuar is a 85 y.o. female s/p ORIF of L distal humerus on 8/16/18       - Weight bearing status: NWB LUE  - Pain control: Per APS  - Antibiotics: post op given  - DVT Prophylaxis: mechanical , SCD's at all times when not ambulating.  - PT/OT  - Dispo: home today                    Erick Rene MD  Orthopedics  [facility]

## 2018-08-18 NOTE — PLAN OF CARE
Problem: Occupational Therapy Goal  Goal: Occupational Therapy Goal  Goals to be met by: 8/24/18     Patient will increase functional independence with ADLs by performing:    UE Dressing with Modified [place].  LE Dressing with Modified [place].  Grooming while standing at sink with Modified [place].  Toileting from bedside commode with Modified [place] for hygiene and clothing management.   Bathing from  edge of bed with Modified [place].  Toilet transfer to bedside commode with Modified [place].  Increased functional strength to WFL for L hand.  Upper extremity exercise program x15 reps per handout, with assistance as needed.     Outcome: Ongoing (interventions implemented as appropriate)  Pt continues to progress well with therapy.     Comments: Cont OT POC

## 2018-08-19 RX ORDER — RAMELTEON 8 MG/1
8 TABLET ORAL NIGHTLY
Qty: 10 TABLET | Refills: 0 | Status: SHIPPED | OUTPATIENT
Start: 2018-08-19 | End: 2018-08-27

## 2018-08-19 NOTE — DISCHARGE SUMMARY
"[facility]  Orthopedics  Discharge Summary      Patient Name: Lang Kumar  MRN: [id number]  Admission Date: 8/16/2018  Hospital Length of Stay: 1 days  Discharge Date and Time: 8/18/2018  6:20 PM  Attending Physician: No att. providers found   Discharging Provider: Erick Rene MD  Primary Care Provider: Moon Mccallum MD    HPI: Patient is a 85 y.o. female who presents after a fall onto her left arm with immediate pain in the left elbow. Denies numbness, tingling. No other injuries during the fall. Is overall in good health last ate 930 pm last night.         Procedure(s) (LRB):  ORIF, FRACTURE, HUMERUS, DISTAL, LEFT (Left)      Hospital Course: Patient presented for above procedure.  Tolerated it well and was discharged home POD2 after voiding, tolerating diet, ambulating, pain controlled.  Patient was informed about signs and symptoms of compartment syndrome and if any of these should present then he should immediately call us back and come to the ED.  Discharge instructions, follow-up appointment, and med rec are below.          Significant Diagnostic Studies: Labs: CBC No results for input(s): WBC, HGB, HCT, PLT in the last 48 hours.    Pending Diagnostic Studies:     None        Final Active Diagnoses:    Diagnosis Date Noted POA    PRINCIPAL PROBLEM:  Closed bicondylar fracture of distal humerus [S42.493A] 08/16/2018 Yes      Problems Resolved During this Admission:      Discharged Condition: good    Disposition: Home or Self Care    Follow Up:  Follow-up Information     Lisbet Saucedo PA-C In 1 week.    Specialty:  Orthopedic Surgery  Contact information:  [address] [place] [address] [id number] [phone]                 Patient Instructions:      WALKER FOR HOME USE     Order Specific Question Answer Comments   Type of Walker: Adult (5'4"-6'6")    With wheels? No    Height: 5' 2" (1.575 m)    Weight: 52.2 kg (115 lb)    Length of need (1-99 months): 99    Does patient " "have medical equipment at home? none    Please check all that apply: Patient is unable to safely ambulate without equipment.    Please check all that apply: Walker will be used for gait training.    Vendor: Other (use comments)    Expected Date of Delivery: 8/17/2018      COMMODE FOR HOME USE     Order Specific Question Answer Comments   Type: Standard    Height: 5' 2" (1.575 m)    Weight: 52.2 kg (115 lb)    Does patient have medical equipment at home? none    Length of need (1-99 months): 99    Vendor: [facility]    Expected Date of Delivery: 8/17/2018      WALKER FOR HOME USE     Order Specific Question Answer Comments   Type of Walker: Adult (5'4"-6'6")    With wheels? Yes    Height: 5' 2" (1.575 m)    Weight: 52.2 kg (115 lb)    Length of need (1-99 months): 99    Does patient have medical equipment at home? none    Please check all that apply: Patient is unable to safely ambulate without equipment.    Please check all that apply: Walker will be used for gait training.    Vendor: [facility]    Expected Date of Delivery: 8/17/2018      Call MD for:  temperature >100.4     Call MD for:  persistent nausea and vomiting or diarrhea     Call MD for:  severe uncontrolled pain     Call MD for:  redness, tenderness, or signs of infection (pain, swelling, redness, odor or green/yellow discharge around incision site)     Call MD for:  difficulty breathing or increased cough     Call MD for:  severe persistent headache     Call MD for:  worsening rash     Call MD for:  persistent dizziness, light-headedness, or visual disturbances     Call MD for:  increased confusion or weakness     Leave dressing on - Keep it clean, dry, and intact until clinic visit     Other restrictions (specify):   Scheduling Instructions: s/p L distal humerus ORIF.  NWB LUE.  Can do ROM of fingers     Medications:  Reconciled Home Medications:      Medication List      START taking these medications    docusate sodium 100 mg capsule  Take 100 mg " by mouth 2 (two) times daily.     ondansetron 8 MG tablet  Commonly known as:  ZOFRAN  Take 1 tablet (8 mg total) by mouth every 8 (eight) hours as needed for Nausea.     oxyCODONE-acetaminophen  mg per tablet  Commonly known as:  PERCOCET  Take 1 tablet by mouth every 4 (four) hours as needed for Pain.        CONTINUE taking these medications    alendronate 70 MG tablet  Commonly known as:  FOSAMAX  Take 1 tablet (70 mg total) by mouth every 7 days.     aspirin 81 MG EC tablet  Commonly known as:  ECOTRIN  Take 1 tablet (81 mg total) by mouth once daily.     ergocalciferol 50,000 unit Cap  Commonly known as:  VITAMIN D2  Take 1 capsule (50,000 Units total) by mouth every 7 days.     ONE DAILY MULTIVITAMIN per tablet  Generic drug:  multivitamin  Take 1 tablet by mouth once daily.     pravastatin 20 MG tablet  Commonly known as:  PRAVACHOL  Take 20 mg by mouth once daily.        STOP taking these medications    HYDROcodone-acetaminophen 5-325 mg per tablet  Commonly known as:  JEANINE Rene MD  Orthopedics  [facility]

## 2018-08-20 ENCOUNTER — TELEPHONE (OUTPATIENT)
Dept: ORTHOPEDICS | Facility: CLINIC | Age: 83
End: 2018-08-20

## 2018-08-20 NOTE — TELEPHONE ENCOUNTER
Spoke with pt's daughter, Digna.   Scheduled pt for po with Lisbet Saucedo PA-C 8/24 at 730 am for splint removal and begin gentle ROM per Dr Carmona

## 2018-08-20 NOTE — PT/OT/SLP DISCHARGE
Occupational Therapy Discharge Summary    Lang Kumar  MRN: [id number]   Principal Problem: Closed bicondylar fracture of distal humerus      Patient Discharged from acute Occupational Therapy on 8/18/2018.  Please refer to prior OT note dated 8/18/2018 for functional status.    Assessment:      Patient appropriate for care in another setting.    Objective:     GOALS:   Multidisciplinary Problems     Occupational Therapy Goals     Not on file          Multidisciplinary Problems (Resolved)        Problem: Occupational Therapy Goal    Goal Priority Disciplines Outcome Interventions   Occupational Therapy Goal   (Resolved)     OT, PT/OT Outcome(s) achieved    Description:  Goals to be met by: 8/24/18     Patient will increase functional independence with ADLs by performing:    UE Dressing with Modified [place].  LE Dressing with Modified [place].  Grooming while standing at sink with Modified [place].  Toileting from bedside commode with Modified [place] for hygiene and clothing management.   Bathing from  edge of bed with Modified [place].  Toilet transfer to bedside commode with Modified [place].  Increased functional strength to WFL for L hand.  Upper extremity exercise program x15 reps per handout, with assistance as needed.                      Reasons for Discontinuation of Therapy Services  Transfer to alternate level of care.      Plan:     Patient Discharged to: Home with Home Health Service    Shawnee Hernandez, OT  8/20/2018

## 2018-08-22 DIAGNOSIS — S42.412A CLOSED FRACTURE OF SUPRACONDYLAR HUMERUS, LEFT, INITIAL ENCOUNTER: Primary | ICD-10-CM

## 2018-08-24 ENCOUNTER — OFFICE VISIT (OUTPATIENT)
Dept: ORTHOPEDICS | Facility: CLINIC | Age: 83
End: 2018-08-24
Payer: MEDICARE

## 2018-08-24 VITALS
BODY MASS INDEX: 21.17 KG/M2 | HEART RATE: 93 BPM | RESPIRATION RATE: 16 BRPM | HEIGHT: 62 IN | SYSTOLIC BLOOD PRESSURE: 126 MMHG | DIASTOLIC BLOOD PRESSURE: 74 MMHG | TEMPERATURE: 98 F | WEIGHT: 115.06 LBS

## 2018-08-24 DIAGNOSIS — S42.412A CLOSED FRACTURE OF SUPRACONDYLAR HUMERUS, LEFT, INITIAL ENCOUNTER: Primary | ICD-10-CM

## 2018-08-24 PROCEDURE — 99024 POSTOP FOLLOW-UP VISIT: CPT | Mod: S$GLB,,, | Performed by: PHYSICIAN ASSISTANT

## 2018-08-24 PROCEDURE — 99999 PR PBB SHADOW E&M-EST. PATIENT-LVL IV: CPT | Mod: PBBFAC,,, | Performed by: PHYSICIAN ASSISTANT

## 2018-08-24 RX ORDER — OXYCODONE AND ACETAMINOPHEN 10; 325 MG/1; MG/1
1 TABLET ORAL EVERY 4 HOURS PRN
Qty: 45 TABLET | Refills: 0 | Status: SHIPPED | OUTPATIENT
Start: 2018-08-24 | End: 2018-08-30

## 2018-08-24 RX ORDER — OXYCODONE AND ACETAMINOPHEN 10; 325 MG/1; MG/1
1 TABLET ORAL EVERY 4 HOURS PRN
Qty: 45 TABLET | Refills: 0 | Status: SHIPPED | OUTPATIENT
Start: 2018-08-24 | End: 2018-08-24 | Stop reason: SDUPTHER

## 2018-08-27 ENCOUNTER — TELEPHONE (OUTPATIENT)
Dept: ORTHOPEDICS | Facility: CLINIC | Age: 83
End: 2018-08-27

## 2018-08-27 DIAGNOSIS — S42.412A CLOSED FRACTURE OF SUPRACONDYLAR HUMERUS, LEFT, INITIAL ENCOUNTER: Primary | ICD-10-CM

## 2018-08-27 RX ORDER — TRAZODONE HYDROCHLORIDE 50 MG/1
50 TABLET ORAL NIGHTLY
Qty: 30 TABLET | Refills: 0 | Status: SHIPPED | OUTPATIENT
Start: 2018-08-27 | End: 2018-09-06

## 2018-08-27 NOTE — TELEPHONE ENCOUNTER
----- Message from Miri Gibbons sent at 8/27/2018  9:37 AM CDT -----  Contact: Daughter(Bharti)  Daughter wants to know if there is a generic brand for sleep medicine that pt insurance will cover due to pt not sleeping at night.   Can be reached @ [phone]      We spoke  Informed she must call her insurance company to find that out    She voiced understanding

## 2018-08-27 NOTE — PROGRESS NOTES
Ms. Kumar is an 85-year-old female who fell   sustaining a very distal left supracondylar humerus fracture with intercondylar extension.  Treated with ORIF on 08/16/2018    Ms. Kumar is here today for a post-operative visit after a   1.  Open treatment of left supracondylar/intercondylar humerus fracture with   open reduction and internal fixation, 48602.  2.  Left ulnar nerve decompression at elbow, 92354  by Dr. Carmona on 08/16/2018.    IMPLANTS:  Synthes.    Interval History:    she reports that she is doing ok.  Pain is controlled.  she is taking pain medication.    she denies fever, chills, and sweats since the time of the surgery.     Physical exam:  Dressing taken down.  Incision is clean, dry and intact.  Sutures I place      RADS: none done today    Assessment:  Post-op visit ( 1 weeks)    Plan:  Current care, treatment plan, precautions, activity level/ modifications, limitations, rehabilitation exercises and proposed future treatment were discussed with the patient. We discussed the need to monitor for changes in symptoms and condition and report them to the physician.  Discussed importance of compliance with all appointments and follow up examinations.   - Keep area covered  - Brance placed range of motion 30 degree arc of motion  - pain medication:  refill needed,    Pain medication refill policy provided to patient for review.   - Patient is to return to clinic in 1 weeks  At time no x-ray of her  is needed  At time consider removal of sutures.      If there are any questions prior to scheduled follow up, the patient was instructed to contact the office

## 2018-08-27 NOTE — TELEPHONE ENCOUNTER
----- Message from Azra Adler sent at 8/27/2018 12:25 PM CDT -----  Contact: PTs Daughter  Daughter called regarding a medication list of all the sleeping medications that are covered under the insurance.   Daughter would like a call back to further supply information.     callback: [phone] - Bharti      spoke with Bharti   Patients  insurance will cover Trazodone sleeping pill.... she would like that called in , melatonin did not work  I gave the message to Mrs Lewis GOMEZ

## 2018-08-30 ENCOUNTER — OFFICE VISIT (OUTPATIENT)
Dept: ORTHOPEDICS | Facility: CLINIC | Age: 83
End: 2018-08-30
Payer: MEDICARE

## 2018-08-30 ENCOUNTER — TELEPHONE (OUTPATIENT)
Dept: ADMINISTRATIVE | Facility: CLINIC | Age: 83
End: 2018-08-30

## 2018-08-30 VITALS
HEART RATE: 82 BPM | SYSTOLIC BLOOD PRESSURE: 112 MMHG | BODY MASS INDEX: 21.17 KG/M2 | DIASTOLIC BLOOD PRESSURE: 63 MMHG | RESPIRATION RATE: 16 BRPM | WEIGHT: 115.06 LBS | HEIGHT: 62 IN | TEMPERATURE: 98 F

## 2018-08-30 DIAGNOSIS — Z98.890 S/P ORIF (OPEN REDUCTION INTERNAL FIXATION) FRACTURE: ICD-10-CM

## 2018-08-30 DIAGNOSIS — S42.412A CLOSED FRACTURE OF SUPRACONDYLAR HUMERUS, LEFT, INITIAL ENCOUNTER: Primary | ICD-10-CM

## 2018-08-30 DIAGNOSIS — Z87.81 S/P ORIF (OPEN REDUCTION INTERNAL FIXATION) FRACTURE: ICD-10-CM

## 2018-08-30 PROCEDURE — 99024 POSTOP FOLLOW-UP VISIT: CPT | Mod: S$GLB,,, | Performed by: PHYSICIAN ASSISTANT

## 2018-08-30 PROCEDURE — 99999 PR PBB SHADOW E&M-EST. PATIENT-LVL IV: CPT | Mod: PBBFAC,,, | Performed by: PHYSICIAN ASSISTANT

## 2018-08-30 RX ORDER — HYDROCODONE BITARTRATE AND ACETAMINOPHEN 10; 325 MG/1; MG/1
1 TABLET ORAL
Qty: 60 TABLET | Refills: 0 | Status: SHIPPED | OUTPATIENT
Start: 2018-08-30 | End: 2018-09-10 | Stop reason: SDUPTHER

## 2018-08-30 NOTE — TELEPHONE ENCOUNTER
Home Health SOC 08/20/2018 to 10/18/2018 with [facility]-, Dr. Sonu Carmona. SN,PT,OT  services.

## 2018-08-31 NOTE — PROGRESS NOTES
Ms. Kumar is an 85-year-old female who fell   sustaining a very distal left supracondylar humerus fracture with intercondylar extension.  Treated with ORIF on 08/16/2018    Ms. Kumar is here today for a post-operative visit after a   1.  Open treatment of left supracondylar/intercondylar humerus fracture with   open reduction and internal fixation, 39633.  2.  Left ulnar nerve decompression at elbow, 44048  by Dr. Carmona on 08/16/2018.    IMPLANTS:  Synthes.    Interval History:    she reports that she is doing ok.  Pain is controlled.  she is taking pain medication.    Main complaint form family is her lack of sleeping due to urinary problems as well as pain. She has tried melatonin, Tylenol PM, antihistamine=mine as well as trazodone without help.   she denies fever, chills, and sweats since the time of the surgery.     Physical exam:  Arrived to clinic with daughter Dressing taken down.  Incision is clean, dry and intact.  Sutures  Place removed without difficulty      RADS: none done today    Assessment:  Post-op visit ( 2 weeks)    Plan:  Current care, treatment plan, precautions, activity level/ modifications, limitations, rehabilitation exercises and proposed future treatment were discussed with the patient. We discussed the need to monitor for changes in symptoms and condition and report them to the physician.  Discussed importance of compliance with all appointments and follow up examinations.   - The patient was advised to keep the incision clean and dry for the next 24 hours after which she may wash the area with antibacterial soap in the shower. Will not submerge until the incision is completely healed  -Patient was advised to monitor wound closely and multiple times daily for any problems. Call clinic immediately or report to ED for immediate medical attention for any complications including reopening of wound, drainage, purulence, redness, streaking, odor, pain out of proportion, fever, chills,  "etc.   - Keep area covered if brace is rubbing  - is to avoid any "trauma" to the elbow including resting it in a way that creates pressure to the wound.  - Brance placed range of motion 30 degree arc of motion, gently range of motion with OT home health they may advance brace as her range of motion advances.   - pain medication:  refill needed, no   Pain medication refill policy provided to patient for review.   - Patient is to return to clinic in 4 weeks  At time  x-ray of her elbow is needed  At time consider removal d/c brace      If there are any questions prior to scheduled follow up, the patient was instructed to contact the office      "

## 2018-09-05 ENCOUNTER — TELEPHONE (OUTPATIENT)
Dept: ORTHOPEDICS | Facility: CLINIC | Age: 83
End: 2018-09-05

## 2018-09-05 NOTE — TELEPHONE ENCOUNTER
I spoke with patients daughter. She stated that her mother is not sleeping more than 1-2 hours at a time. She stated that she has tried multiple thing to help. She stated that the insurance will only cover trazodone and belsoma as a sleep aid. She stated that the trazadone has not helped. She request a prescription for belsoma . She will take half a pill qhs. If this helps she will need to get any refill form PCP.     ----- Message from Susan Renner MA sent at 9/5/2018 11:59 AM CDT -----  Contact: Bharti, Daughter  Pt still not sleeping. Getting up every two hours. The name of the medication Belsomra. Can pt try that medication. Thanks    ----- Message -----  From: Lucy Kline  Sent: 9/5/2018  11:34 AM  To: Lewis Frausto Staff    Ms. Hay is calling because she called yesterday, but has not received a returned call regarding a change in sleeping medication.  She is requesting a returned call.    She can be reached at [phone].    Thank you.

## 2018-09-07 ENCOUNTER — TELEPHONE (OUTPATIENT)
Dept: ORTHOPEDICS | Facility: CLINIC | Age: 83
End: 2018-09-07

## 2018-09-07 NOTE — TELEPHONE ENCOUNTER
Patient daughter calling because she is having pain in her knees. She stated that due to this she is having to take pain medication every 4 hours to help.    ----- Message from Susan Renner MA sent at 9/7/2018 11:24 AM CDT -----  Contact: daughter Bharti      ----- Message -----  From: Miri Gibbons  Sent: 9/7/2018  11:16 AM  To: Lewis Frausto Staff    Bharti stating that it seems Pt, is going back wards pt seems to be in pain a lot,   Daughter has several questions, can be reached @[phone]

## 2018-09-10 ENCOUNTER — TELEPHONE (OUTPATIENT)
Dept: ORTHOPEDICS | Facility: CLINIC | Age: 83
End: 2018-09-10

## 2018-09-10 DIAGNOSIS — Z98.890 S/P ORIF (OPEN REDUCTION INTERNAL FIXATION) FRACTURE: ICD-10-CM

## 2018-09-10 DIAGNOSIS — S42.412A CLOSED FRACTURE OF SUPRACONDYLAR HUMERUS, LEFT, INITIAL ENCOUNTER: Primary | ICD-10-CM

## 2018-09-10 DIAGNOSIS — Z87.81 S/P ORIF (OPEN REDUCTION INTERNAL FIXATION) FRACTURE: ICD-10-CM

## 2018-09-10 DIAGNOSIS — G89.18 POST-OPERATIVE PAIN: ICD-10-CM

## 2018-09-10 RX ORDER — HYDROCODONE BITARTRATE AND ACETAMINOPHEN 10; 325 MG/1; MG/1
1 TABLET ORAL
Qty: 60 TABLET | Refills: 0 | Status: SHIPPED | OUTPATIENT
Start: 2018-09-10 | End: 2018-09-10

## 2018-09-10 RX ORDER — HYDROCODONE BITARTRATE AND ACETAMINOPHEN 10; 325 MG/1; MG/1
1 TABLET ORAL
Qty: 60 TABLET | Refills: 0 | Status: SHIPPED | OUTPATIENT
Start: 2018-09-10 | End: 2018-09-19 | Stop reason: SDUPTHER

## 2018-09-10 NOTE — TELEPHONE ENCOUNTER
Informed pt that her RX NORCO  mg sent to [facility] will be cx [phone]. Pt would like a new Rx NORCO  mg sent to [facility]. Done.

## 2018-09-18 ENCOUNTER — TELEPHONE (OUTPATIENT)
Dept: ORTHOPEDICS | Facility: CLINIC | Age: 83
End: 2018-09-18

## 2018-09-18 NOTE — TELEPHONE ENCOUNTER
----- Message from Lashaun Conteh sent at 9/18/2018  9:34 AM CDT -----  Contact: pt daughter/Bharti  Please call pt daughter at [phone] if any questions. Refill request needed for Hydrocodone  mg and Belsomra 5 mg (new Rx needed for taking a whole tab now) called into [facility] [address] [place] [address] [place] AT [facility] [phone] (Phone)  [phone] (Fax)    Thank you

## 2018-09-19 ENCOUNTER — TELEPHONE (OUTPATIENT)
Dept: ORTHOPEDICS | Facility: CLINIC | Age: 83
End: 2018-09-19

## 2018-09-19 RX ORDER — HYDROCODONE BITARTRATE AND ACETAMINOPHEN 10; 325 MG/1; MG/1
1 TABLET ORAL
Qty: 60 TABLET | Refills: 0 | Status: SHIPPED | OUTPATIENT
Start: 2018-09-19 | End: 2018-09-28 | Stop reason: SDUPTHER

## 2018-09-28 ENCOUNTER — OFFICE VISIT (OUTPATIENT)
Dept: ORTHOPEDICS | Facility: CLINIC | Age: 83
End: 2018-09-28
Payer: MEDICARE

## 2018-09-28 ENCOUNTER — PES CALL (OUTPATIENT)
Dept: ADMINISTRATIVE | Facility: CLINIC | Age: 83
End: 2018-09-28

## 2018-09-28 ENCOUNTER — HOSPITAL ENCOUNTER (OUTPATIENT)
Dept: RADIOLOGY | Facility: HOSPITAL | Age: 83
Discharge: HOME | End: 2018-09-28
Attending: PHYSICIAN ASSISTANT
Payer: MEDICARE

## 2018-09-28 DIAGNOSIS — Z87.81 S/P ORIF (OPEN REDUCTION INTERNAL FIXATION) FRACTURE: ICD-10-CM

## 2018-09-28 DIAGNOSIS — Z98.890 S/P ORIF (OPEN REDUCTION INTERNAL FIXATION) FRACTURE: ICD-10-CM

## 2018-09-28 DIAGNOSIS — S42.412A CLOSED FRACTURE OF SUPRACONDYLAR HUMERUS, LEFT, INITIAL ENCOUNTER: ICD-10-CM

## 2018-09-28 DIAGNOSIS — S42.412A CLOSED FRACTURE OF SUPRACONDYLAR HUMERUS, LEFT, INITIAL ENCOUNTER: Primary | ICD-10-CM

## 2018-09-28 DIAGNOSIS — R26.89 BALANCE DISORDER: ICD-10-CM

## 2018-09-28 PROCEDURE — 99213 OFFICE O/P EST LOW 20 MIN: CPT | Mod: PBBFAC,25 | Performed by: PHYSICIAN ASSISTANT

## 2018-09-28 PROCEDURE — 99999 PR PBB SHADOW E&M-EST. PATIENT-LVL III: CPT | Mod: PBBFAC,,, | Performed by: PHYSICIAN ASSISTANT

## 2018-09-28 PROCEDURE — 73080 X-RAY EXAM OF ELBOW: CPT | Mod: 26,LT,, | Performed by: RADIOLOGY

## 2018-09-28 PROCEDURE — 99024 POSTOP FOLLOW-UP VISIT: CPT | Mod: ,,, | Performed by: PHYSICIAN ASSISTANT

## 2018-09-28 PROCEDURE — 73080 X-RAY EXAM OF ELBOW: CPT | Mod: TC,LT

## 2018-09-28 RX ORDER — HYDROCODONE BITARTRATE AND ACETAMINOPHEN 10; 325 MG/1; MG/1
1 TABLET ORAL EVERY 6 HOURS PRN
Qty: 60 TABLET | Refills: 0 | Status: SHIPPED | OUTPATIENT
Start: 2018-09-28 | End: 2018-10-12 | Stop reason: SDUPTHER

## 2018-09-28 NOTE — PROGRESS NOTES
Ms. Kumar is an 85-year-old female who fell   sustaining a very distal left supracondylar humerus fracture with intercondylar extension.  Treated with ORIF on 08/16/2018    Ms. Kumar is here today for a post-operative visit after a   1.  Open treatment of left supracondylar/intercondylar humerus fracture with   open reduction and internal fixation, 37843.  2.  Left ulnar nerve decompression at elbow, 74598  by Dr. Carmona on 08/16/2018.    IMPLANTS:  Synthes.    Interval History:    she reports that she is doing ok.  Pain is controlled.  she is taking pain medication.  Daughter stated that this is helping and reports her pain is decreasing. They are working on weaning.   She was recieving home health but was stopped due to meeting all goals.   she denies fever, chills, and sweats since the time of the surgery.     Physical exam:  Arrived to clinic with daughter Dressing taken down.  Incision is clean, dry and intact. Well healed, range of motion     RADS: Postoperative changes of internal fixation of the distal humerus identified.  The position alignment is satisfactory and unchanged as compared to the previous study    Assessment:  Post-op visit ( 6 weeks)    Plan:  Current care, treatment plan, precautions, activity level/ modifications, limitations, rehabilitation exercises and proposed future treatment were discussed with the patient. We discussed the need to monitor for changes in symptoms and condition and report them to the physician.  Discussed importance of compliance with all appointments and follow up examinations.   -  she may wash the area with antibacterial soap in the shower. Will not submerge until the incision is completely healed  -Patient was advised to monitor wound closely and multiple times daily for any problems. Call clinic immediately or report to ED for immediate medical attention for any complications including reopening of wound, drainage, purulence, redness, streaking, odor,  "pain out of proportion, fever, chills, etc.   - is to avoid any "trauma" to the elbow including resting it in a way that creates pressure to the wound.  -PT/OT, moustapha tyson, Patient is responsible to establish and continue care   -range of motion as tolerated, may juma form brace   - NWB  weeks pending healing   - pain medication:  refill needed, no   Pain medication refill policy provided to patient for review.   - Patient is to return to clinic in 6 weeks  At time  x-ray of her elbow is needed     If there are any questions prior to scheduled follow up, the patient was instructed to contact the office    "

## 2018-10-05 ENCOUNTER — TELEPHONE (OUTPATIENT)
Dept: NEUROLOGY | Facility: CLINIC | Age: 83
End: 2018-10-05

## 2018-10-05 NOTE — TELEPHONE ENCOUNTER
Spoke to daughter about appt on Monday.    ----- Message from Tristan Kee sent at 10/5/2018  9:29 AM CDT -----  Needs Advice    Reason for call: Bharti is asking to speak w/ the nurse to give a synopsis of pt's condition before appt on 10/08        Communication Preference: Bharti (daughter) @ [phone]    Additional Information:

## 2018-10-08 ENCOUNTER — OFFICE VISIT (OUTPATIENT)
Dept: NEUROLOGY | Facility: CLINIC | Age: 83
End: 2018-10-08
Payer: MEDICARE

## 2018-10-08 VITALS
DIASTOLIC BLOOD PRESSURE: 63 MMHG | BODY MASS INDEX: 20.77 KG/M2 | RESPIRATION RATE: 18 BRPM | HEART RATE: 80 BPM | SYSTOLIC BLOOD PRESSURE: 138 MMHG | WEIGHT: 112.88 LBS | HEIGHT: 62 IN

## 2018-10-08 DIAGNOSIS — R41.3 MEMORY LOSS: Primary | ICD-10-CM

## 2018-10-08 PROCEDURE — 1100F PTFALLS ASSESS-DOCD GE2>/YR: CPT | Mod: CPTII,,, | Performed by: PSYCHIATRY & NEUROLOGY

## 2018-10-08 PROCEDURE — 99999 PR PBB SHADOW E&M-EST. PATIENT-LVL III: CPT | Mod: PBBFAC,,, | Performed by: PSYCHIATRY & NEUROLOGY

## 2018-10-08 PROCEDURE — 3288F FALL RISK ASSESSMENT DOCD: CPT | Mod: CPTII,,, | Performed by: PSYCHIATRY & NEUROLOGY

## 2018-10-08 PROCEDURE — 99213 OFFICE O/P EST LOW 20 MIN: CPT | Mod: PBBFAC | Performed by: PSYCHIATRY & NEUROLOGY

## 2018-10-08 PROCEDURE — 99204 OFFICE O/P NEW MOD 45 MIN: CPT | Mod: S$PBB,,, | Performed by: PSYCHIATRY & NEUROLOGY
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
## 2018-10-08 NOTE — LETTER
October 8, 2018      Elly Reyes, FNP-C  [address] [facility] [address]           [facility]  [address] [facility] [address]  [facility] [address]  Phone: [phone]  Fax: [phone]          Patient: Lang Kumar   MR Number: [id number]   YOB: 1932   Date of Visit: 10/8/2018       Dear Elly Reyes:    Thank you for referring Lang Kumar to me for evaluation. Attached you will find relevant portions of my assessment and plan of care.    If you have questions, please do not hesitate to call me. I look forward to following Lang Kumar along with you.    Sincerely,    Izzy Enriquez, DO    Enclosure  CC:  No Recipients    If you would like to receive this communication electronically, please contact [email or web address] or [phone] to request more information on [facility] Link access.    For providers and/or their staff who would like to refer a patient to [facility], please contact us through our one-stop-shop provider referral line, [facility], at [phone].    If you feel you have received this communication in error or would no longer like to receive these types of communications, please e-mail [email or web address]

## 2018-10-12 ENCOUNTER — TELEPHONE (OUTPATIENT)
Dept: ORTHOPEDICS | Facility: CLINIC | Age: 83
End: 2018-10-12

## 2018-10-12 DIAGNOSIS — S42.412A CLOSED FRACTURE OF SUPRACONDYLAR HUMERUS, LEFT, INITIAL ENCOUNTER: Primary | ICD-10-CM

## 2018-10-12 DIAGNOSIS — Z87.81 S/P ORIF (OPEN REDUCTION INTERNAL FIXATION) FRACTURE: ICD-10-CM

## 2018-10-12 DIAGNOSIS — Z98.890 S/P ORIF (OPEN REDUCTION INTERNAL FIXATION) FRACTURE: ICD-10-CM

## 2018-10-12 RX ORDER — HYDROCODONE BITARTRATE AND ACETAMINOPHEN 10; 325 MG/1; MG/1
1 TABLET ORAL EVERY 6 HOURS PRN
Qty: 60 TABLET | Refills: 0 | Status: SHIPPED | OUTPATIENT
Start: 2018-10-12 | End: 2018-10-22 | Stop reason: SDUPTHER

## 2018-10-12 NOTE — TELEPHONE ENCOUNTER
----- Message from Lisbet Saucedo PA-C sent at 10/12/2018  6:17 AM CDT -----  Contact: Navjot Giron/ [phone]  Done    ----- Message -----  From: Susan Renner MA  Sent: 10/11/2018  11:17 AM  To: Lisbet Saucedo PA-C        ----- Message -----  From: Erin Sharp  Sent: 10/11/2018  11:09 AM  To: [facility] Staff    Patient daughter would like a refill on the patient sleep medication and also her HYDROcodone-acetaminophen (NORCO)  mg per tablet. Patient daughter would like it sent over to [facility] [address] [address] phone number [phone]. Patient daughter would also like a phone call when the patient medication is sent over to the pharmacy.

## 2018-10-12 NOTE — TELEPHONE ENCOUNTER
Notified pt Daughter Bree/ [phone]. Rx sent over. Patient Daughter Bree states verbal understanding and has no further questions.

## 2018-10-16 ENCOUNTER — HOSPITAL ENCOUNTER (OUTPATIENT)
Dept: RADIOLOGY | Facility: HOSPITAL | Age: 83
Discharge: HOME | End: 2018-10-16
Attending: PSYCHIATRY & NEUROLOGY
Payer: MEDICARE

## 2018-10-16 DIAGNOSIS — R41.3 MEMORY LOSS: ICD-10-CM

## 2018-10-16 PROCEDURE — 70551 MRI BRAIN STEM W/O DYE: CPT | Mod: 26,,, | Performed by: RADIOLOGY

## 2018-10-16 PROCEDURE — 70551 MRI BRAIN STEM W/O DYE: CPT | Mod: TC

## 2018-10-17 ENCOUNTER — TELEPHONE (OUTPATIENT)
Dept: ORTHOPEDICS | Facility: CLINIC | Age: 83
End: 2018-10-17

## 2018-10-17 NOTE — TELEPHONE ENCOUNTER
Spoke to Bharti and informed her that the order was sent on 09/28 @1247 initially. Bharti stated that the facility said they never received orders. I informed Bharti that I would re-fax the orders. Bharti requested a confirmation that orders was sent. I faxed orders to facility @ [phone] and confirmed with Bharti that per confirmation sheet faxed was successfully sent at 1003. Bharti verbalized understanding.               ----- Message from Erin Sharp sent at 10/17/2018  9:11 AM CDT -----  Contact: Daughter/ [phone]  Patient daughter would like a call back to ask questions about the patient physical therapy.

## 2018-10-22 ENCOUNTER — TELEPHONE (OUTPATIENT)
Dept: ORTHOPEDICS | Facility: CLINIC | Age: 83
End: 2018-10-22

## 2018-10-22 RX ORDER — HYDROCODONE BITARTRATE AND ACETAMINOPHEN 10; 325 MG/1; MG/1
1 TABLET ORAL EVERY 6 HOURS PRN
Qty: 42 TABLET | Refills: 0 | Status: SHIPPED | OUTPATIENT
Start: 2018-10-22 | End: 2018-11-12 | Stop reason: SDUPTHER

## 2018-10-22 NOTE — TELEPHONE ENCOUNTER
Patient is mainly taking 3 a day, they are working on weaning.      ----- Message from Susan Renner MA sent at 10/22/2018 12:37 PM CDT -----  Contact: Bree Morfin      ----- Message -----  From: Apple Douglas  Sent: 10/22/2018  12:19 PM  To: [facility] Staff    Rx Refill/Request      Daughter     Is this a Refill or New Rx: refill  Rx Name and Strength: HYDROcodone-acetaminophen (NORCO)  mg per tablet  Preferred Pharmacy with phone number: [facility] [address] [place] [address] [place] AT [facility],[phone]  Communication Preference: [phone],cell  Additional Information: Please contact daughter once the script has been sent over

## 2018-11-01 ENCOUNTER — OFFICE VISIT (OUTPATIENT)
Dept: NEUROLOGY | Facility: CLINIC | Age: 83
End: 2018-11-01
Payer: MEDICARE

## 2018-11-01 VITALS
WEIGHT: 115.31 LBS | SYSTOLIC BLOOD PRESSURE: 153 MMHG | BODY MASS INDEX: 21.22 KG/M2 | DIASTOLIC BLOOD PRESSURE: 68 MMHG | HEIGHT: 62 IN | HEART RATE: 71 BPM

## 2018-11-01 DIAGNOSIS — F03.90 DEMENTIA WITHOUT BEHAVIORAL DISTURBANCE, UNSPECIFIED DEMENTIA TYPE: Primary | ICD-10-CM

## 2018-11-01 PROCEDURE — 99213 OFFICE O/P EST LOW 20 MIN: CPT | Mod: PBBFAC | Performed by: PSYCHIATRY & NEUROLOGY

## 2018-11-01 PROCEDURE — 99499 UNLISTED E&M SERVICE: CPT | Mod: HCNC,S$GLB,, | Performed by: PSYCHIATRY & NEUROLOGY

## 2018-11-01 PROCEDURE — 99213 OFFICE O/P EST LOW 20 MIN: CPT | Mod: S$GLB,,, | Performed by: PSYCHIATRY & NEUROLOGY

## 2018-11-01 PROCEDURE — 1100F PTFALLS ASSESS-DOCD GE2>/YR: CPT | Mod: CPTII,S$GLB,, | Performed by: PSYCHIATRY & NEUROLOGY

## 2018-11-01 PROCEDURE — 99999 PR PBB SHADOW E&M-EST. PATIENT-LVL III: CPT | Mod: PBBFAC,,, | Performed by: PSYCHIATRY & NEUROLOGY

## 2018-11-01 PROCEDURE — 3288F FALL RISK ASSESSMENT DOCD: CPT | Mod: CPTII,S$GLB,, | Performed by: PSYCHIATRY & NEUROLOGY

## 2018-11-01 RX ORDER — DONEPEZIL HYDROCHLORIDE 10 MG/1
10 TABLET, FILM COATED ORAL NIGHTLY
Qty: 30 TABLET | Refills: 3 | Status: SHIPPED | OUTPATIENT
Start: 2018-12-01 | End: 2019-02-05

## 2018-11-01 RX ORDER — DONEPEZIL HYDROCHLORIDE 5 MG/1
5 TABLET, FILM COATED ORAL NIGHTLY
Qty: 30 TABLET | Refills: 0 | Status: SHIPPED | OUTPATIENT
Start: 2018-11-01 | End: 2019-02-05

## 2018-11-01 RX ORDER — DONEPEZIL HYDROCHLORIDE 10 MG/1
10 TABLET, FILM COATED ORAL NIGHTLY
Qty: 30 TABLET | Refills: 3 | Status: SHIPPED | OUTPATIENT
Start: 2018-12-01 | End: 2018-11-01

## 2018-11-01 RX ORDER — DONEPEZIL HYDROCHLORIDE 5 MG/1
5 TABLET, FILM COATED ORAL NIGHTLY
Qty: 30 TABLET | Refills: 0 | Status: SHIPPED | OUTPATIENT
Start: 2018-11-01 | End: 2018-11-01

## 2018-11-01 RX ORDER — DONEPEZIL HYDROCHLORIDE 5 MG/1
5 TABLET, FILM COATED ORAL NIGHTLY
Qty: 30 TABLET | Refills: 3 | Status: SHIPPED | OUTPATIENT
Start: 2018-11-01 | End: 2018-11-01
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
## 2018-11-12 ENCOUNTER — OFFICE VISIT (OUTPATIENT)
Dept: ORTHOPEDICS | Facility: CLINIC | Age: 83
End: 2018-11-12
Payer: MEDICARE

## 2018-11-12 ENCOUNTER — HOSPITAL ENCOUNTER (OUTPATIENT)
Dept: RADIOLOGY | Facility: HOSPITAL | Age: 83
Discharge: HOME | End: 2018-11-12
Attending: PHYSICIAN ASSISTANT
Payer: MEDICARE

## 2018-11-12 VITALS
DIASTOLIC BLOOD PRESSURE: 70 MMHG | RESPIRATION RATE: 16 BRPM | HEART RATE: 76 BPM | SYSTOLIC BLOOD PRESSURE: 138 MMHG | BODY MASS INDEX: 21.22 KG/M2 | WEIGHT: 115.31 LBS | TEMPERATURE: 98 F | HEIGHT: 62 IN

## 2018-11-12 DIAGNOSIS — S42.412A CLOSED FRACTURE OF SUPRACONDYLAR HUMERUS, LEFT, INITIAL ENCOUNTER: Primary | ICD-10-CM

## 2018-11-12 DIAGNOSIS — Z87.81 S/P ORIF (OPEN REDUCTION INTERNAL FIXATION) FRACTURE: ICD-10-CM

## 2018-11-12 DIAGNOSIS — Z98.890 S/P ORIF (OPEN REDUCTION INTERNAL FIXATION) FRACTURE: ICD-10-CM

## 2018-11-12 DIAGNOSIS — S42.412A CLOSED FRACTURE OF SUPRACONDYLAR HUMERUS, LEFT, INITIAL ENCOUNTER: ICD-10-CM

## 2018-11-12 PROCEDURE — 73080 X-RAY EXAM OF ELBOW: CPT | Mod: TC,HCNC,LT

## 2018-11-12 PROCEDURE — 73080 X-RAY EXAM OF ELBOW: CPT | Mod: 26,HCNC,LT, | Performed by: RADIOLOGY

## 2018-11-12 PROCEDURE — 99024 POSTOP FOLLOW-UP VISIT: CPT | Mod: HCNC,S$GLB,, | Performed by: PHYSICIAN ASSISTANT

## 2018-11-12 PROCEDURE — 99999 PR PBB SHADOW E&M-EST. PATIENT-LVL IV: CPT | Mod: PBBFAC,HCNC,, | Performed by: PHYSICIAN ASSISTANT

## 2018-11-12 RX ORDER — HYDROCODONE BITARTRATE AND ACETAMINOPHEN 10; 325 MG/1; MG/1
1 TABLET ORAL EVERY 6 HOURS PRN
Qty: 42 TABLET | Refills: 0 | Status: SHIPPED | OUTPATIENT
Start: 2018-11-12 | End: 2019-02-18

## 2018-11-12 NOTE — PROGRESS NOTES
Ms. Kumar is an 85-year-old female who fell   sustaining a very distal left supracondylar humerus fracture with intercondylar extension.  Treated with ORIF on 08/16/2018    Ms. Kumar is here today for a post-operative visit after a   1.  Open treatment of left supracondylar/intercondylar humerus fracture with   open reduction and internal fixation, 79658.  2.  Left ulnar nerve decompression at elbow, 22585  by Dr. Carmona on 08/16/2018.    IMPLANTS:  Synthes.    Interval History:    she reports that she is doing ok.  Pain is controlled.  she is taking pain medication.  Daughter stated that this is helping and reports her pain is decreasing. They are working on weaning.   She was recieving home health but was stopped due to meeting all goals.   she denies fever, chills, and sweats since the time of the surgery.     Physical exam:  Arrived to clinic with daughter Dressing taken down.  Incision is clean, dry and intact. Well healed, range of motion , tender to palpation to bicep tendon     RADS: Postoperative changes of internal fixation of the distal humerus identified.  The position alignment is satisfactory and unchanged as compared to the previous study    Assessment:  Post-op visit ( 12 weeks)    Plan:  Current care, treatment plan, precautions, activity level/ modifications, limitations, rehabilitation exercises and proposed future treatment were discussed with the patient. We discussed the need to monitor for changes in symptoms and condition and report them to the physician.  Discussed importance of compliance with all appointments and follow up examinations.   -  she may wash the area with antibacterial soap in the shower. Will not submerge until the incision is completely healed  -Patient was advised to monitor wound closely and multiple times daily for any problems. Call clinic immediately or report to ED for immediate medical attention for any complications including reopening of wound, drainage,  "purulence, redness, streaking, odor, pain out of proportion, fever, chills, etc.   - is to avoid any "trauma" to the elbow including resting it in a way that creates pressure to the wound.  -PT/OT, moustapha tyson, Patient is responsible to establish and continue care   -range of motion as tolerated,    - slowly advance weight bearing   - pain medication:  refill needed, yes   Pain medication refill policy provided to patient for review. Appointment made with PMR she will also speak with PCP.   - Patient is to return to clinic in 12 weeks  At time  x-ray of her elbow is needed     If there are any questions prior to scheduled follow up, the patient was instructed to contact the office    "

## 2018-11-13 ENCOUNTER — TELEPHONE (OUTPATIENT)
Dept: FAMILY MEDICINE | Facility: CLINIC | Age: 83
End: 2018-11-13

## 2018-11-13 ENCOUNTER — PES CALL (OUTPATIENT)
Dept: ADMINISTRATIVE | Facility: CLINIC | Age: 83
End: 2018-11-13

## 2018-11-13 NOTE — TELEPHONE ENCOUNTER
Spoke with pt daughter requesting a handicap license tag. States she walks extremely slow and its hard for her to walk long distances.   Please ad

## 2018-11-20 ENCOUNTER — OFFICE VISIT (OUTPATIENT)
Dept: FAMILY MEDICINE | Facility: CLINIC | Age: 83
End: 2018-11-20
Payer: MEDICARE

## 2018-11-20 VITALS
BODY MASS INDEX: 21.67 KG/M2 | HEART RATE: 65 BPM | WEIGHT: 117.75 LBS | HEIGHT: 62 IN | DIASTOLIC BLOOD PRESSURE: 84 MMHG | SYSTOLIC BLOOD PRESSURE: 132 MMHG | OXYGEN SATURATION: 97 %

## 2018-11-20 DIAGNOSIS — R41.3 MEMORY LOSS: ICD-10-CM

## 2018-11-20 DIAGNOSIS — Z23 NEED FOR VACCINATION: ICD-10-CM

## 2018-11-20 DIAGNOSIS — N39.46 URGE AND STRESS INCONTINENCE: ICD-10-CM

## 2018-11-20 DIAGNOSIS — I70.0 ATHEROSCLEROSIS OF AORTIC ARCH: ICD-10-CM

## 2018-11-20 DIAGNOSIS — M81.0 OSTEOPOROSIS, POST-MENOPAUSAL: ICD-10-CM

## 2018-11-20 DIAGNOSIS — Z00.00 ENCOUNTER FOR PREVENTIVE HEALTH EXAMINATION: Primary | ICD-10-CM

## 2018-11-20 DIAGNOSIS — E78.5 HYPERLIPIDEMIA, UNSPECIFIED HYPERLIPIDEMIA TYPE: ICD-10-CM

## 2018-11-20 PROBLEM — N39.0 RECURRENT UTI: Status: RESOLVED | Noted: 2017-01-16 | Resolved: 2018-11-20

## 2018-11-20 PROCEDURE — 99999 PR PBB SHADOW E&M-EST. PATIENT-LVL IV: CPT | Mod: PBBFAC,HCNC,, | Performed by: NURSE PRACTITIONER

## 2018-11-20 PROCEDURE — G0008 ADMIN INFLUENZA VIRUS VAC: HCPCS | Mod: HCNC,S$GLB,, | Performed by: NURSE PRACTITIONER

## 2018-11-20 PROCEDURE — 90662 IIV NO PRSV INCREASED AG IM: CPT | Mod: HCNC,S$GLB,, | Performed by: NURSE PRACTITIONER

## 2018-11-20 PROCEDURE — G0439 PPPS, SUBSEQ VISIT: HCPCS | Mod: HCNC,S$GLB,, | Performed by: NURSE PRACTITIONER

## 2018-11-20 RX ORDER — PRAVASTATIN SODIUM 20 MG/1
20 TABLET ORAL DAILY
Qty: 30 TABLET | Refills: 3 | Status: SHIPPED | OUTPATIENT
Start: 2018-11-20 | End: 2019-04-03 | Stop reason: SDUPTHER

## 2018-11-20 NOTE — PATIENT INSTRUCTIONS
Counseling and Referral of Other Preventative  (Italic type indicates deductible and co-insurance are waived)    Patient Name: Lang Kumar  Today's Date: 11/20/2018    Health Maintenance       Date Due Completion Date    Sign Pain Contract 09/21/1950 ---    Complete Opioid Risk Tool 09/21/1950 ---    TETANUS VACCINE 09/13/2015 9/13/2005, Patient aware of recommendation for tetanus vaccine.     Influenza Vaccine 08/01/2018 11/8/2017, Received 11/20/2018    DEXA SCAN 11/21/2018 11/21/2016    Override on 12/5/2011: Done    Lipid Panel 08/30/2022 8/30/2017        No orders of the defined types were placed in this encounter.    The following information is provided to all patients.  This information is to help you find resources for any of the problems found today that may be affecting your health:                Living healthy guide: www.[facility].[place].gov      Understanding Diabetes: www.diabetes.org      Eating healthy: www.cdc.gov/healthyweight      CDC home safety checklist: www.cdc.gov/steadi/patient.html      Agency on Aging: www.goea.[place].[facility]      Alcoholics anonymous (AA): www.aa.org      Physical Activity: www.ilan.nih.gov/ws1ritm      Tobacco use: www.quitwithusla.org

## 2018-11-20 NOTE — PROGRESS NOTES
"Lang Valence presented for a  Medicare AWV and comprehensive Health Risk Assessment today. The following components were reviewed and updated:    · Medical history  · Family History  · Social history  · Allergies and Current Medications  · Health Risk Assessment  · Health Maintenance  · Care Team     ** See Completed Assessments for Annual Wellness Visit within the encounter summary.**       The following assessments were completed:  · Living Situation  · CAGE  · Depression Screening  · Timed Get Up and Go  · Whisper Test  · Cognitive Function Screening  ·   ·   · Nutrition Screening  · ADL Screening  · PAQ Screening    Vitals:    11/20/18 1504   BP: 132/84   BP Location: Left arm   Patient Position: Sitting   BP Method: Medium (Manual)   Pulse: 65   SpO2: 97%   Weight: 53.4 kg (117 lb 11.6 oz)   Height: 5' 2" (1.575 m)     Body mass index is 21.53 kg/m².  Physical Exam   Cardiovascular: Normal rate.   Pulmonary/Chest: Effort normal.   Neurological: She is alert.   Skin: Skin is warm. Capillary refill takes less than 2 seconds.   Psychiatric: She has a normal mood and affect. Her behavior is normal. Thought content normal.   Vitals reviewed.        Diagnoses and health risks identified today and associated recommendations/orders:    1. Encounter for preventive health examination  Education provided about preventive health examinations and procedures; addressed and discussed patient's health concerns. Additionally, reviewed medical record for risk factors and documented the results during this encounter.    2. Atherosclerosis of aortic arch  Stable, asymptomatic; monitor.     3. Hyperlipidemia, unspecified hyperlipidemia type  - pravastatin (PRAVACHOL) 20 MG tablet; Take 1 tablet (20 mg total) by mouth once daily.  Dispense: 30 tablet; Refill: 3    4. Urge and stress incontinence  Patient uses incontinence pads; also she was evaluated by [facility] urology dept; continue as advised.     5. Osteoporosis, " post-menopausal  Stable and controlled. Continue current treatment plan as previously prescribed with your PCP.   Bone Density Scan     6. Need for vaccination  - Influenza - High Dose (65+) (PF) (IM)    7. Memory loss   Stable, patient evaluated/monitored by [facility] neurology  dept; continue as advised.       Reviewed health maintenance, educated about recommended examinations, procedures (labs & images), and immunizations. Declined tetanus vaccine at today's visit.     Provided Leverda with a 5-10 year written screening schedule and personal prevention plan. Recommendations were developed using the USPSTF age appropriate recommendations. Education, counseling, and referrals were provided as needed. After Visit Summary printed and given to patient which includes a list of additional screenings\tests needed.    Follow-up in about 1 year (around 11/20/2019) for assessment .    Ti Parikh Jr, NP

## 2018-11-21 ENCOUNTER — TELEPHONE (OUTPATIENT)
Dept: FAMILY MEDICINE | Facility: CLINIC | Age: 83
End: 2018-11-21

## 2018-11-21 PROBLEM — R41.3 MEMORY LOSS: Status: ACTIVE | Noted: 2018-11-21

## 2018-11-21 NOTE — TELEPHONE ENCOUNTER
"----- Message from RAMY Mayfield sent at 11/21/2018  3:18 PM CST -----  Contact: genesis "daughter" [phone]      ----- Message -----  From: Moon Mccallum MD  Sent: 11/21/2018   3:00 PM  To: RAMY Mayfield    This will need to come from ortho if they feel she still needs opioids. I recommend she switch to tylenol.  ----- Message -----  From: RAMY Mayfield  Sent: 11/21/2018  12:05 PM  To: Moon Mccallum MD        ----- Message -----  From: Pauline Bustamante  Sent: 11/21/2018  11:24 AM  To: RAMY Mayfield    Requesting a refill on HYDROcodone-acetaminophen (NORCO)  mg per tablet  .  [facility] [address] [place] [address] [place] [facility]      "

## 2018-11-21 NOTE — TELEPHONE ENCOUNTER
Daughter, Bree, notified of Dr. Mccallum's message below and verbalized understanding but states that ortho informed the patient that since she is >90 days post-op, she must contact her PCP for refills of this medication.  Daughter reports that the patient is currently in therapy for her leg and her arm, for 2 hours, 3 times weekly and that the patient has tried Tylenol but it does not help her.  Bree reports the patient also has difficulty sleeping due to her pain and is frustrated that her mother is unable to obtain a refill from anyone to help alleviate her pain, especially due to her age and since she is still in therapy.  She asks if it is possible for the patient to obtain a refill to help her at least complete her therapy sessions.  Please advise.

## 2018-11-23 NOTE — TELEPHONE ENCOUNTER
Opioid pain medication should be used sparingly in the acute setting of surgery only. This far out from surgery we should use otc medication only. Since her kidney function is ok she can also try advil or aleve.

## 2018-12-11 ENCOUNTER — TELEPHONE (OUTPATIENT)
Dept: NEUROLOGY | Facility: CLINIC | Age: 83
End: 2018-12-11

## 2018-12-11 NOTE — TELEPHONE ENCOUNTER
----- Message from Clara Wilkinson sent at 12/11/2018  2:02 PM CST -----  Contact: Filemonmarv daughter  Pt would like to be called back regarding to medications and dosage     Pt daughter Cristy can be reached at [phone]    Told her daughter to give her 2 of the 5mg and call us when she gets low & we will call in a new prescription with a dose change.

## 2019-02-05 ENCOUNTER — TELEPHONE (OUTPATIENT)
Dept: NEUROLOGY | Facility: CLINIC | Age: 84
End: 2019-02-05

## 2019-02-05 DIAGNOSIS — F03.90 DEMENTIA WITHOUT BEHAVIORAL DISTURBANCE, UNSPECIFIED DEMENTIA TYPE: Primary | ICD-10-CM

## 2019-02-05 RX ORDER — DONEPEZIL HYDROCHLORIDE 10 MG/1
10 TABLET, FILM COATED ORAL NIGHTLY
Qty: 30 TABLET | Refills: 3 | Status: SHIPPED | OUTPATIENT
Start: 2019-02-05 | End: 2019-03-08 | Stop reason: SDUPTHER

## 2019-02-05 NOTE — TELEPHONE ENCOUNTER
Notified daughter.      ----- Message from Izzy Enriquez DO sent at 2/5/2019  4:06 PM CST -----  Contact: Daughter Bharti: [phone]  Put in script for aricept 10mg at bedtime. Can you let patient know? Thanks.   ----- Message -----  From: Cirilo Butcher MA  Sent: 2/5/2019   1:32 PM  To: Izzy Enriquez DO        ----- Message -----  From: Bing Dawson  Sent: 2/5/2019   1:23 PM  To: Yobany Magana Staff    Pt calling to increase dosage of medication from 5mg to 10mg (donepezil (ARICEPT) 5 MG tablet)  Please call with an update @ [phone]    [facility] [address] [place] [address] [place] AT [facility]

## 2019-02-18 ENCOUNTER — OFFICE VISIT (OUTPATIENT)
Dept: ORTHOPEDICS | Facility: CLINIC | Age: 84
End: 2019-02-18
Payer: MEDICARE

## 2019-02-18 ENCOUNTER — HOSPITAL ENCOUNTER (OUTPATIENT)
Dept: RADIOLOGY | Facility: HOSPITAL | Age: 84
Discharge: HOME | End: 2019-02-18
Attending: PHYSICIAN ASSISTANT
Payer: MEDICARE

## 2019-02-18 VITALS — WEIGHT: 117.75 LBS | HEIGHT: 62 IN | BODY MASS INDEX: 21.67 KG/M2

## 2019-02-18 DIAGNOSIS — Z98.890 S/P ORIF (OPEN REDUCTION INTERNAL FIXATION) FRACTURE: ICD-10-CM

## 2019-02-18 DIAGNOSIS — Z87.81 S/P ORIF (OPEN REDUCTION INTERNAL FIXATION) FRACTURE: ICD-10-CM

## 2019-02-18 DIAGNOSIS — S42.412A CLOSED FRACTURE OF SUPRACONDYLAR HUMERUS, LEFT, INITIAL ENCOUNTER: Primary | ICD-10-CM

## 2019-02-18 DIAGNOSIS — S42.412A CLOSED FRACTURE OF SUPRACONDYLAR HUMERUS, LEFT, INITIAL ENCOUNTER: ICD-10-CM

## 2019-02-18 DIAGNOSIS — M17.0 OSTEOARTHRITIS OF BOTH KNEES, UNSPECIFIED OSTEOARTHRITIS TYPE: ICD-10-CM

## 2019-02-18 PROCEDURE — 99213 PR OFFICE/OUTPT VISIT, EST, LEVL III, 20-29 MIN: ICD-10-PCS | Mod: HCNC,S$GLB,, | Performed by: PHYSICIAN ASSISTANT

## 2019-02-18 PROCEDURE — 99999 PR PBB SHADOW E&M-EST. PATIENT-LVL III: CPT | Mod: PBBFAC,HCNC,, | Performed by: PHYSICIAN ASSISTANT

## 2019-02-18 PROCEDURE — 73080 XR ELBOW COMPLETE 3 VIEW LEFT: ICD-10-PCS | Mod: 26,HCNC,LT, | Performed by: RADIOLOGY

## 2019-02-18 PROCEDURE — 1101F PR PT FALLS ASSESS DOC 0-1 FALLS W/OUT INJ PAST YR: ICD-10-PCS | Mod: HCNC,CPTII,S$GLB, | Performed by: PHYSICIAN ASSISTANT

## 2019-02-18 PROCEDURE — 99999 PR PBB SHADOW E&M-EST. PATIENT-LVL III: ICD-10-PCS | Mod: PBBFAC,HCNC,, | Performed by: PHYSICIAN ASSISTANT

## 2019-02-18 PROCEDURE — 99213 OFFICE O/P EST LOW 20 MIN: CPT | Mod: HCNC,S$GLB,, | Performed by: PHYSICIAN ASSISTANT

## 2019-02-18 PROCEDURE — 73080 X-RAY EXAM OF ELBOW: CPT | Mod: TC,HCNC,LT

## 2019-02-18 PROCEDURE — 73080 X-RAY EXAM OF ELBOW: CPT | Mod: 26,HCNC,LT, | Performed by: RADIOLOGY

## 2019-02-18 PROCEDURE — 1101F PT FALLS ASSESS-DOCD LE1/YR: CPT | Mod: HCNC,CPTII,S$GLB, | Performed by: PHYSICIAN ASSISTANT

## 2019-02-18 RX ORDER — MELOXICAM 15 MG/1
15 TABLET ORAL DAILY
Qty: 30 TABLET | Refills: 0 | Status: SHIPPED | OUTPATIENT
Start: 2019-02-18 | End: 2019-03-21 | Stop reason: SDUPTHER

## 2019-02-18 NOTE — PROGRESS NOTES
Ms. uKmar is an 85-year-old female who fell   sustaining a very distal left supracondylar humerus fracture with intercondylar extension.  Treated with ORIF on 08/16/2018    Ms. Kumar is here today for a post-operative visit after a   1.  Open treatment of left supracondylar/intercondylar humerus fracture with   open reduction and internal fixation, 12670.  2.  Left ulnar nerve decompression at elbow, 85765  by Dr. Carmona on 08/16/2018.    IMPLANTS:  Synthes.    Interval History:    she reports that she is doing ok.  Pain is controlled.  she is taking pain medication.    She reports that she is doing very well.  she denies fever, chills, and sweats since the time of the surgery.     She stated that her elbow does not bother her her knees do. She has prior diagnoses of arthritis. She has been treating with NSAID'S, but stated that they are not helping as musch as they use to. Pain is mainly medial. Pain is increased with standing and walking.     Physical exam:  Arrived to clinic with daughter .  Incision is clean, dry and intact. Well healed, range of motion , no TTP.   Knee: full range of motion TTP medial joint line bilaterally.     RADS: No new fracture dislocation or joint effusion.study    Assessment:  Post-op visit ( 6 months)    Plan:  Current care, treatment plan, precautions, activity level/ modifications, limitations, rehabilitation exercises and proposed future treatment were discussed with the patient. We discussed the need to monitor for changes in symptoms and condition and report them to the physician.  Discussed importance of compliance with all appointments and follow up examinations.   -  she may wash the area with antibacterial soap in the shower. Will not submerge until the incision is completely healed  -Patient was advised to monitor wound closely and multiple times daily for any problems. Call clinic immediately or report to ED for immediate medical attention for any complications  including reopening of wound, drainage, purulence, redness, streaking, odor, pain out of proportion, fever, chills, etc.   -PT/OT,continue exercises as taught to her, Patient is responsible to continue care   -range of motion as tolerated,    - WBAT  - pain medication:  refill needed, no   Pain medication refill policy provided to patient for review.   - Patient is to return to clinic at 1 year joselin if pain  At time  x-ray of her elbow is needed    At this time discussed treatment options for DJD knee. She will use NSAID. If continued pain consider injection.      If there are any questions prior to scheduled follow up, the patient was instructed to contact the office

## 2019-02-27 ENCOUNTER — TELEPHONE (OUTPATIENT)
Dept: ORTHOPEDICS | Facility: CLINIC | Age: 84
End: 2019-02-27

## 2019-02-27 NOTE — TELEPHONE ENCOUNTER
----- Message from David Huertas sent at 2/27/2019  2:28 PM CST -----  Contact: Daughter - Bharti Chun  Reason for call: Fax form on 2/19 to fax number:  to be completed by Lisbet Saucedo however have not received the completed form as of yet        Communication Preference: Phone     Additional Information: N / A

## 2019-02-27 NOTE — TELEPHONE ENCOUNTER
Notified pt Daughter - Bharti Chun. Regarding  FMLA form. Form is being reviewed; may be fax no later than Friday 2/28/19. Patient  Daughter - Bharti Chun states verbal understanding and has no further questions.

## 2019-03-08 ENCOUNTER — OFFICE VISIT (OUTPATIENT)
Dept: NEUROLOGY | Facility: CLINIC | Age: 84
End: 2019-03-08
Payer: MEDICARE

## 2019-03-08 VITALS
HEIGHT: 62 IN | WEIGHT: 119.06 LBS | DIASTOLIC BLOOD PRESSURE: 64 MMHG | HEART RATE: 65 BPM | SYSTOLIC BLOOD PRESSURE: 149 MMHG | BODY MASS INDEX: 21.91 KG/M2

## 2019-03-08 DIAGNOSIS — F03.90 DEMENTIA WITHOUT BEHAVIORAL DISTURBANCE, UNSPECIFIED DEMENTIA TYPE: Primary | ICD-10-CM

## 2019-03-08 PROCEDURE — 99499 RISK ADDL DX/OHS AUDIT: ICD-10-PCS | Mod: HCNC,S$GLB,, | Performed by: PSYCHIATRY & NEUROLOGY

## 2019-03-08 PROCEDURE — 99999 PR PBB SHADOW E&M-EST. PATIENT-LVL III: ICD-10-PCS | Mod: PBBFAC,,, | Performed by: PSYCHIATRY & NEUROLOGY

## 2019-03-08 PROCEDURE — 99499 UNLISTED E&M SERVICE: CPT | Mod: HCNC,S$GLB,, | Performed by: PSYCHIATRY & NEUROLOGY

## 2019-03-08 PROCEDURE — 99213 PR OFFICE/OUTPT VISIT, EST, LEVL III, 20-29 MIN: ICD-10-PCS | Mod: S$GLB,,, | Performed by: PSYCHIATRY & NEUROLOGY

## 2019-03-08 PROCEDURE — 1101F PT FALLS ASSESS-DOCD LE1/YR: CPT | Mod: CPTII,S$GLB,, | Performed by: PSYCHIATRY & NEUROLOGY

## 2019-03-08 PROCEDURE — 99999 PR PBB SHADOW E&M-EST. PATIENT-LVL III: CPT | Mod: PBBFAC,,, | Performed by: PSYCHIATRY & NEUROLOGY

## 2019-03-08 PROCEDURE — 1101F PR PT FALLS ASSESS DOC 0-1 FALLS W/OUT INJ PAST YR: ICD-10-PCS | Mod: CPTII,S$GLB,, | Performed by: PSYCHIATRY & NEUROLOGY

## 2019-03-08 PROCEDURE — 99213 OFFICE O/P EST LOW 20 MIN: CPT | Mod: S$GLB,,, | Performed by: PSYCHIATRY & NEUROLOGY

## 2019-03-08 RX ORDER — DONEPEZIL HYDROCHLORIDE 10 MG/1
10 TABLET, FILM COATED ORAL NIGHTLY
Qty: 30 TABLET | Refills: 6 | Status: SHIPPED | OUTPATIENT
Start: 2019-03-08 | End: 2019-09-26 | Stop reason: SDUPTHER
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
## 2019-03-21 RX ORDER — MELOXICAM 15 MG/1
TABLET ORAL
Qty: 30 TABLET | Refills: 0 | Status: SHIPPED | OUTPATIENT
Start: 2019-03-21 | End: 2019-05-06 | Stop reason: SDUPTHER

## 2019-04-03 DIAGNOSIS — E78.5 HYPERLIPIDEMIA, UNSPECIFIED HYPERLIPIDEMIA TYPE: ICD-10-CM

## 2019-04-03 RX ORDER — PRAVASTATIN SODIUM 20 MG/1
TABLET ORAL
Qty: 30 TABLET | Refills: 0 | Status: SHIPPED | OUTPATIENT
Start: 2019-04-03 | End: 2019-05-16 | Stop reason: SDUPTHER

## 2019-05-06 ENCOUNTER — TELEPHONE (OUTPATIENT)
Dept: ORTHOPEDICS | Facility: CLINIC | Age: 84
End: 2019-05-06

## 2019-05-06 RX ORDER — MELOXICAM 15 MG/1
TABLET ORAL
Qty: 30 TABLET | Refills: 0 | Status: SHIPPED | OUTPATIENT
Start: 2019-05-06 | End: 2019-08-01

## 2019-05-06 NOTE — TELEPHONE ENCOUNTER
----- Message from Lisbet Saucedo PA-C sent at 5/6/2019  6:13 AM CDT -----  Contact: Daughter, (bharti)  Done    ----- Message -----  From: Susan Renner MA  Sent: 5/3/2019  10:23 AM  To: Lisbet Saucedo PA-C    Notified pt daughter Lauren, who is aware the EDMUNDO Saucedo PA-C is not in the office today 5/3/19. She return back on Monday 5/6/19. Pt daughter stated that the medication is helping her mother. Request refill. Patient daughter Lauren states verbal understanding and has no further questions.    ----- Message -----  From: Miri Gibbons  Sent: 5/3/2019   9:41 AM  To: Lewis Frausto Staff    Bharti Is needing a call back regarding a refill on meloxicam (MOBIC) 15 MG tablet  bharti also has questions   Bharti can be reached @ [phone]

## 2019-05-06 NOTE — TELEPHONE ENCOUNTER
Notified pt daughter Bharti that her mother Rx sent to the pharmacy. Patient daughter Bharti states verbal understanding and has no further questions.

## 2019-05-16 ENCOUNTER — TELEPHONE (OUTPATIENT)
Dept: FAMILY MEDICINE | Facility: CLINIC | Age: 84
End: 2019-05-16

## 2019-05-16 DIAGNOSIS — E78.5 HYPERLIPIDEMIA, UNSPECIFIED HYPERLIPIDEMIA TYPE: ICD-10-CM

## 2019-05-16 RX ORDER — PRAVASTATIN SODIUM 20 MG/1
TABLET ORAL
Qty: 30 TABLET | Refills: 0 | Status: SHIPPED | OUTPATIENT
Start: 2019-05-16 | End: 2019-07-05 | Stop reason: SDUPTHER

## 2019-05-16 NOTE — TELEPHONE ENCOUNTER
----- Message from Ti Parikh Jr. NP sent at 5/16/2019  7:55 AM CDT -----  Regarding: Scheduling an appointment   Please assist patient in scheduling an appointment.  Her cholesterol medication has been refilled for 30 days.  She's due for her annual with Dr. Mccallum.     JAYLYN Parikh

## 2019-06-06 ENCOUNTER — INITIAL CONSULT (OUTPATIENT)
Dept: NEUROLOGY | Facility: CLINIC | Age: 84
End: 2019-06-06
Payer: MEDICARE

## 2019-06-06 DIAGNOSIS — F03.90 DEMENTIA WITHOUT BEHAVIORAL DISTURBANCE, UNSPECIFIED DEMENTIA TYPE: ICD-10-CM

## 2019-06-06 DIAGNOSIS — F02.80 MAJOR NEUROCOGNITIVE DISORDER DUE TO MULTIPLE ETIOLOGIES: ICD-10-CM

## 2019-06-06 PROCEDURE — 96139 PSYCL/NRPSYC TST TECH EA: CPT | Mod: HCNC,S$GLB,, | Performed by: CLINICAL NEUROPSYCHOLOGIST

## 2019-06-06 PROCEDURE — 96132 PR NEUROPSYCHOLOGIC TEST EVAL SVCS, 1ST HR: ICD-10-PCS | Mod: HCNC,S$GLB,, | Performed by: CLINICAL NEUROPSYCHOLOGIST

## 2019-06-06 PROCEDURE — 96139 PR PSYCH/NEUROPSYCH TEST ADMIN/SCORING, BY TECH, 2+ TESTS, EA ADDTL 30 MIN: ICD-10-PCS | Mod: HCNC,S$GLB,, | Performed by: CLINICAL NEUROPSYCHOLOGIST

## 2019-06-06 PROCEDURE — 99499 UNLISTED E&M SERVICE: CPT | Mod: HCNC,S$GLB,, | Performed by: CLINICAL NEUROPSYCHOLOGIST

## 2019-06-06 PROCEDURE — 96132 NRPSYC TST EVAL PHYS/QHP 1ST: CPT | Mod: HCNC,S$GLB,, | Performed by: CLINICAL NEUROPSYCHOLOGIST

## 2019-06-06 PROCEDURE — 90791 PSYCH DIAGNOSTIC EVALUATION: CPT | Mod: HCNC,S$GLB,, | Performed by: CLINICAL NEUROPSYCHOLOGIST

## 2019-06-06 PROCEDURE — 99499 NO LOS: ICD-10-PCS | Mod: HCNC,S$GLB,, | Performed by: CLINICAL NEUROPSYCHOLOGIST

## 2019-06-06 PROCEDURE — 96133 NRPSYC TST EVAL PHYS/QHP EA: CPT | Mod: HCNC,S$GLB,, | Performed by: CLINICAL NEUROPSYCHOLOGIST

## 2019-06-06 PROCEDURE — 96138 PR PSYCH/NEUROPSYCH TEST ADMIN/SCORING, BY TECH, 2+ TESTS, 1ST 30 MIN: ICD-10-PCS | Mod: HCNC,S$GLB,, | Performed by: CLINICAL NEUROPSYCHOLOGIST

## 2019-06-06 PROCEDURE — 96138 PSYCL/NRPSYC TECH 1ST: CPT | Mod: HCNC,S$GLB,, | Performed by: CLINICAL NEUROPSYCHOLOGIST

## 2019-06-06 PROCEDURE — 96133 PR NEUROPSYCHOLOGIC TEST EVAL SVCS, EA ADDTL HR: ICD-10-PCS | Mod: HCNC,S$GLB,, | Performed by: CLINICAL NEUROPSYCHOLOGIST

## 2019-06-06 PROCEDURE — 90791 PR PSYCHIATRIC DIAGNOSTIC EVALUATION: ICD-10-PCS | Mod: HCNC,S$GLB,, | Performed by: CLINICAL NEUROPSYCHOLOGIST

## 2019-06-06 NOTE — PROGRESS NOTES
Outpatient Neuropsychological Evaluation    Referral Information  Name: Lang Kumar  MRN: [id number]  LYON: 2019  : 1932  Age: 86 y.o.  Handedness: Right  Race: White  Gender: female  Referring Provider: Izzy Menchaca Do  [address]  [address]  [place] [address]  Referral Reason/Medical Necessity: Cognitive difficulties with neuropsychological evaluation ordered by Neurology to characterize cognitive status, differential diagnosis, and updated treatment recommendations.   Billing: See at the end of the report as billing and coding has changed in 2019.  Consent: The patient expressed an understanding of the purpose of the evaluation and consented to all procedures.    HPI/Cognitive symptoms  [Onset/Course]: Ms. Kumar's daughter reported insidious onset of short-term memory loss about 3-years ago with no known precipitant. Initial symptoms were mild consistent of general forgetfulness, misplacing things, and repeating a little more than usual. Course has been a progressive worsening with a more noticeable decline over the past 18-months. As a result, Dr. Haywood evaluated her for memory loss and functional changes in 2018. Her MMSE at that time was 26/30 and MoCA was 16/30. Reversible labs were unremarkable and imaging was not revealing of any acute process. Currently, daughter reports that patient is very forgetful (needs reminders constantly and cannot manage many IADLs due to forgetfulness) and has significant executive dysfunction characterized by deferring most decisions to her daughter.     Neuropsychiatric Sxs:  · Mood:   · Depression: None  · Anxiety: None  · Other:    · Neurovegetative:  · Sleep: Generally okay, but 1-2x weekly with trouble falling and asleep.   · Appetite: Unremarkable  · Energy: Adequate  · Behavioral Concerns: None  · Delusions/Hallucinations: None  · SI/HI: None    Physical Sxs:  · Motor: slower and uses a cane.   · Sensory: Slightly hard of  hearing  · Pain: None    Current Functioning (I/ADLs):  · ADLs: Independent  · IADLs: Needs more help  · Finances: Daughter has been handling for the past year  · Medication Mgmt:  Daughters needs to help  · Driving: No longer driving for the past 6-months  · Household Mgmt: Living with daughter now and daughter is cooking meals.       Family History   Problem Relation Age of Onset    Lung cancer Sister         smoker    Coronary artery disease Father     Stroke Mother     Fibromyalgia Sister     Arthritis Sister         back and knee     Breast cancer Neg Hx     Ovarian cancer Neg Hx     Diabetes Neg Hx     Colon cancer Neg Hx      Family Neurologic History: Dementia (late 80s)  Family Psychiatric History: Negative for heritable risk factors    Developmental/Academic Hx:  Developmental: No gestational or later developmental concerns.  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    MEDICAL HISTORY  Patient Active Problem List   Diagnosis    Vitamin D deficiency disease    Osteoporosis, post-menopausal    Urge and stress incontinence    Atherosclerosis of aortic arch    Coronary artery disease involving native coronary artery of native heart without angina pectoris    Hyperlipidemia    Closed supracondylar fracture of left humerus    Closed bicondylar fracture of distal humerus    Major neurocognitive disorder due to multiple etiologies     Past Medical History:   Diagnosis Date    Abnormal exam  or test finding 12/2011    coronary calcium score 204 = moderate plaque burden and high coronary heart disease risk    Arthritis     Atherosclerosis of aortic arch     - noted on CXR 11/2016    Borderline hyperlipidemia     Coronary artery disease involving native coronary artery of native heart without angina pectoris 11/30/2016    Osteoporosis, post-menopausal     Urge and stress incontinence     uses pessary; followed by gynecology    Uterine prolapse     Vitamin D deficiency disease      Past Surgical History:   Procedure Laterality Date    CATARACT EXTRACTION, BILATERAL      COLPOCLEISIS-LEFORTE N/A 8/19/2014    Performed [facility] at [facility] 2ND FLR    COSMETIC SURGERY      CYSTOSCOPY N/A 8/19/2014    Performed by [facility] at [facility]    ORIF, FRACTURE, HUMERUS, DISTAL, LEFT Left 8/16/2018    [facility]       Neurologic History  · TBI: None  · Seizures: None  · Stroke: None  · Movement Disorder: None      Lab Results   Component Value Date    YGLYPBAO00 301 01/15/2018     Lab Results   Component Value Date    RPR Non-reactive 01/15/2018     No results found for: FOLATE  Lab Results   Component Value Date    TSH 3.467 01/15/2018     Lab Results   Component Value Date    HGBA1C 5.4 11/21/2016     No results found for: HIV1X2, OGR33PSDQ    Neurodiagnostics    MRI on 10/08/18  FINDINGS:  There are no regions of restricted diffusion.  There is no extra-axial fluid collection, midline shift, mass or mass effect.  There are no regions of acute intracranial hemorrhage.  The periventricular white matter shows T2 hyperintensity.  There is evidence of cerebral atrophy with proportional ventricular dilatation.      Impression       There is no evidence acute intracranial process.  There is cerebral atrophy with proportional ventricular dilatation and periventricular white matter changes.      Electronically signed by: Shanelle Zambrano  "MD  Date: 10/17/2018       Current Outpatient Medications:     aspirin (ECOTRIN) 81 MG EC tablet, Take 1 tablet (81 mg total) by mouth once daily., Disp: 90 tablet, Rfl: 3    donepezil (ARICEPT) 10 MG tablet, Take 1 tablet (10 mg total) by mouth every evening., Disp: 30 tablet, Rfl: 6    ergocalciferol (VITAMIN D2) 50,000 unit Cap, Take 1 capsule (50,000 Units total) by mouth every 7 days., Disp: 4 capsule, Rfl: 11    meloxicam (MOBIC) 15 MG tablet, TAKE 1 TABLET(15 MG) BY MOUTH EVERY DAY, Disp: 30 tablet, Rfl: 0    multivitamin (ONE DAILY MULTIVITAMIN) per tablet, Take 1 tablet by mouth once daily., Disp: , Rfl:     pravastatin (PRAVACHOL) 20 MG tablet, TAKE 1 TABLET BY MOUTH EVERY DAY, Disp: 30 tablet, Rfl: 0    Psychiatric Hx:  · Childhood: None  · Adult: None  · Substance Use: None     Social History     Tobacco Use    Smoking status: Former Smoker     Packs/day: 0.50     Types: Cigarettes     Last attempt to quit: 1970     Years since quittin.0    Smokeless tobacco: Never Used   Substance and Sexual Activity    Alcohol use: Yes     Alcohol/week: 1.2 oz     Types: 2 Glasses of wine per week     Comment: once a week    Drug use: No    Sexual activity: Never       MENTAL STATUS AND OBSERVATIONS:  APPEARANCE: Casually dressed and adequate grooming/hygiene.   ALERTNESS/ORIENTATION: Attentive and alert. See below for orientation  GAIT: Unobserved as in wheelchair, but uses a cane at home  MOTOR MOVEMENTS/MANNERISMS: Unremarkable  SPEECH/LANGUAGE: Normal in rate, rhythm, tone, and volume. No significant word finding difficulty noted. Expressive and receptive language was intact for basic information. Reduced spontaneous communication and symptom report was vague and non-descriptive when prompted with questions.   STATED MOOD/AFFECT: The patients stated mood was "fine." Affect was congruent with stated mood.   INTERPERSONAL BEHAVIOR: Rapport was quickly and easily established "   SUICIDALITY/HOMICIDALITY: Denied  HALLUCINATIONS/DELUSIONS: None evidenced or endorsed  THOUGHT PROCESSES: Thoughts seemed logical and goal-directed.   TEST TAKING BEHAVIOR and VALIDITY: Freestanding and embedded performance validity measures and observation of effort were suggestive of adequate engagement. The current results, therefore, are likely a valid reflection of the patient's current functioning.     PROCEDURES/TESTS ADMINISTERED:  In addition to performing a review of pertinent medical records, reviewing limits to confidentiality, conducting a clinical interview, and explaining procedures, the following measures were administered:Mini-Mental State Examination (MMSE; Montezuma et al., 1993 norms);   Wide Range Achievement Test - Fourth Edition (WRAT-IV; Green Form) Reading Test;   Frontal Assessment Battery (JULEE);   Wechsler Adult Intelligence Scale-IV (Digit Span;   Trail Making Test, parts A and B (João et al., 2004 norms);   NAB Naming Test   Category and Letter-cued verbal fluency (animal naming/FAS; João et al., 2004 norms);   Repeatable Battery for the Assessment of Neuropsychological Status (RBANS: A: Update);   Geriatric Depression Scale (GDS-30); AREN-7    Manual norms were used unless otherwise indicated.  Review data Appendix Below for scores and percentiles.     TEST RESULTS  Pre-Morbid Functioning: Average    Mental Status: Impaired with MMSE=19/30 and disoriented to time    Attention/Working Memory:  Normal basic attention/working memory and more trouble with complex working memory    Processing or Mental Speed: Extremely slow across all measures and slow observationally    Language: Expressive and receptive language was intact for basic information. Reduced spontaneous communication and symptom report was vague and non-descriptive when prompted with questions. Naming was low average. Phonemic fluency (T=27) was impaired and semantic fluency (T=20) was similarly impaired.       Visuospatial/Construction: Construction of a complex figure was impaired, but due to inattention to detail rather than any perceptual problems. Visual discrimination was normal.     Learning and Memory: List learning was borderline range with no benefit from repetitions (flat learning curve). Story learning was very impaired. Recall was similarly low for both word list and story. Recognition cues only modestly improved recall. Visual recall was borderline range.     Executive or Frontal-lobe Functions: Impaired globally on all measures administered.     Psychiatric or Behavioral Symptoms: No major sxs.     OVERALL SUMMARY  Overall, Ms. Kumar has a Major Neurocognitive Disorder or dementia (MMSE=19/30). Differential diagnosis includes the following  --Alzheimer's Disease: Neuropsychological profile (disorientation to time, memory impairment), onset/course of cognitive/functional decline, and base rates suggest this is the most likely primary cause.   --Vascular Disease: Her vascular health issues and aspects of neuropsychological testing (very slow mental speed, slightly better than expected recognition memory (albeit still poor), worse phonemic fluency) also suggest a vascular component.  --Ongoing follow-up and below recommendations to monitor/manage her cognitive/functional decline.     Referral Dx:  Memory Loss    Consult Dx:  Major neurocognitive disorder due to multiple etiologies        EDMUNDO Murray II, Ph.D., ABPP-CN  Board Certified Clinical Neuropsychologist  Co-Director, Cognitive Disorders and Brain Health Program  Department of Neurology and Neurosciences  [facility]    RECOMMENDATIONS/TREATMENT PLAN  Follow Up Recommendations:  · Neurology Follow-up: Continued Neurology follow-up as recommended by Ms. Amador neurologist.   · Medical Follow-up: Continued follow-up as recommended by Ms. Amador treatment providers. Specific areas of concern include: Will discuss more monitoring of  her medications and vascular health by daughter during feedback  · Audiology Evaluation: Consultation with audiology is recommended to document current hearing abilities and provide accommodation/treatment, as needed.  · Neuropsychology: Neuropsychological reevaluation is recommended in 12-months following implementation of recommendations.    Recommendations for Ms. Kumar and Caregivers/Family:   · Dementia Recommendations: Will provide our lengthy handout detailing strategies to manage various aspects of dementia, communication, functional living needs, legal issues, and so forth. .   · Practice good cognitive hygiene:  · Engage in regular exercise, which increases alertness and arousal and can improve attention and focus.  Consider lower impact exercises, such as yoga or light walking.  · Get a good nights sleep, as this can enhance alertness and cognition.  · Eat healthy foods and balanced meals. It is notable that research indicates certain nutrients may aid in brain function, such as B vitamins (especially B6, B12, and folic acid), antioxidants (such as vitamins C and E, and beta carotene), and Omega-3 fatty acids. Talk with your physician or nutritionist about whats right for you.   · Keep your brain active. Find activities to stay mentally active, such as reading, games (cards, checkers), puzzles (crosswords, Sudoku, jig saw), crafts (models, woodworking), gardening, or participating in activities in the community.  · Stay socially engaged. Continue staying active with your family and friends.  · Prepare for the future: Ms. Kumar and caregivers should consider formal arrangements to allow a designated person to make medical and financial decisions for Ms. Kumar, should she become unable to do so.  Options to consider include designating a healthcare proxy, medical and/or financial power of , and completing advanced directives for healthcare decisions and estate planning (e.g., finalizing a  will).  If cost is prohibitive, [facility] [employer] (https://[facility].org/) provides free  for individuals with low income.  · Resources: Consider resources for support through the [facility] Office of Elderly Affairs (http://goea.[place].[facility]/), [place] Chapter of the Alzheimers Association (www.alz.org/[place]/), the Family Caregiver [employer] (www.caregiver.org), and the American Psychological Association (http://www.apa.org/pi/about/publications/caregivers/consumers/index.aspxconsumers/index.aspx).    BILLING  Service Description CPT Code Minutes Units   Psychiatric diagnostic evaluation by physician [id number] [phone]   Neurobehavioral status exam by physician [id number]  0   Each additional hour by physician [id number]  0   Test Evaluation Services --  --   Neuropsychological testing evaluation services by physician [id number] 1   Each additional hour by physician [id number] 1   Test Administration and Scoring --  --   Psychological or neuropsychological test administration and scoring by physician [id number]  0   Each additional 30 minutes by physician [id number]  0   Psychological or neuropsychological test administration and scoring by technician [id number] 1   Each additional 30 minutes by technician [id number] [phone]     DATA APPENDIX  PERFORMANCE VALIDITY  RDS...................................7 /       Percentile Interpretation:        </=3rd......................................Abnormal        4th-9th.....................................Borderline Abnormal        10th-24th...................................Low Average        25th-74th...................................Average        75th-90th...................................High Average        91st-97th...................................Superior        >/=97th.....................................Very Superior        SCREENING OF GENERAL COGNITIVE FUNCTIONING:    MMSE (total score/%ile):     Total Score (max = 30)...............19 / Impaired  Orientation to  time..................1 / 5  Orientation to place.................4 / 5    JULEE (total score/descriptor):........10 / Impaired    RBANS-A :  SUBTEST SCORES (raw score/percentile):   List Learning............................18 / 9th  Story Memory.............................3 / <1st  Figure Copy..............................12 / 1st  Line Orientation.........................15 / 26-50th  Picture Naming...........................10 / >75th  Semantic Fluency.........................10 / 2nd  Digit Span...............................8 / 37th  Coding...................................14 / <1st  List Recall..............................1 / 10-16th  List Recognition.........................16 / 3-9th  Story Recall.............................1 / 1st  Figure Recall............................5 / 9th     INDEX SCORES (standard score/percentile)  Immediate Memory.........................65 / 1st  Visuospatial/Constructional..............75 / 5th  Language.................................86 / 18th  Attention................................72 / 3rd  Delayed Memory...........................60 / <1st  Full Score...............................64 / 1st       ESTIMATED FSIQ and READING LEVEL:      WRAT-4 (Raw/SS/%ile)..................50 / 99 / 47        AUDITORY ATTENTION AND WORKING MEMORY    [id number] Span        Forward raw..........................7 / 16th      Forward span.........................5 /       Backward raw.........................6 / 37th      Backward span........................3 /       Sequencing raw.......................2 / 9th      Sequencing span......................2 /       Overall (SS/percentile)..............6 / 9th                MOTOR AND ORAL PROCESSING SPEED     Trail Making Test (sec. to completion/percentile):        Part A .....................................102 / <1st          Errors..................................1 /           LANGUAGE FUNCTIONING    WORD PRODUCTIVITY    Verbal Fluency  Tests (raw/percentiles):        FAS.........................................15 / 1st      Animals.....................................6 / <1st      CONFRONTATION NAMING    NAB Naming Test (raw/percentile)        Total Spontaneous (max. = 31)...............27/ 14th                  EXECUTIVE FUNCTIONING          Trail Making Test (sec. to completion/%ile):        Part B ......................................D/C /           Errors................................... /       SELF-REPORTED MOOD  AREN-7...........................................0 /   GDS.............................................9 /

## 2019-06-07 PROBLEM — F02.80 MAJOR NEUROCOGNITIVE DISORDER DUE TO MULTIPLE ETIOLOGIES: Status: ACTIVE | Noted: 2018-11-21

## 2019-06-07 NOTE — ASSESSMENT & PLAN NOTE
-2019 Neuropsych (MMSE=19/30). Differential diagnosis includes the following  --Alzheimer's Disease: Neuropsychological profile (disorientation to time, memory impairment), onset/course of cognitive/functional decline, and base rates suggest this is the most likely primary cause.   --Vascular Disease: Her vascular health issues and aspects of neuropsychological testing (very slow mental speed, slightly better than expected recognition memory (albeit still poor), worse phonemic fluency) also suggest a vascular component.  --Ongoing follow-up and below recommendations to monitor/manage her cognitive/functional decline

## 2019-06-11 ENCOUNTER — TELEPHONE (OUTPATIENT)
Dept: PRIMARY CARE CLINIC | Facility: CLINIC | Age: 84
End: 2019-06-11

## 2019-06-11 NOTE — TELEPHONE ENCOUNTER
----- Message from Kathryn Alvarez sent at 6/11/2019  8:55 AM CDT -----  Contact: Pt daughter Bharti Hay is requesting a callback from office says she would like to get a new doctor for her mother says doctor is highly recommended and would like to schedule a visit to establish care with doctor.     Bharti can be reached at [phone]

## 2019-06-13 ENCOUNTER — OFFICE VISIT (OUTPATIENT)
Dept: NEUROLOGY | Facility: CLINIC | Age: 84
End: 2019-06-13
Payer: MEDICARE

## 2019-06-13 DIAGNOSIS — F03.90 MAJOR NEUROCOGNITIVE DISORDER: Primary | ICD-10-CM

## 2019-06-13 PROCEDURE — 99499 NO LOS: ICD-10-PCS | Mod: HCNC,S$GLB,, | Performed by: CLINICAL NEUROPSYCHOLOGIST

## 2019-06-13 PROCEDURE — 99499 UNLISTED E&M SERVICE: CPT | Mod: HCNC,S$GLB,, | Performed by: CLINICAL NEUROPSYCHOLOGIST

## 2019-06-13 NOTE — PROGRESS NOTES
Neuropsychology Feedback Note    Date of Session: 06/13/2019  Session Content: Results and recommendations were discussed for 40-minutes. Review Neuropsychology Consult dated 6/6/19 for details. No further neuropsychology feedback needed.

## 2019-07-05 DIAGNOSIS — E78.5 HYPERLIPIDEMIA, UNSPECIFIED HYPERLIPIDEMIA TYPE: ICD-10-CM

## 2019-07-06 RX ORDER — PRAVASTATIN SODIUM 20 MG/1
TABLET ORAL
Qty: 30 TABLET | Refills: 0 | Status: SHIPPED | OUTPATIENT
Start: 2019-07-06 | End: 2019-08-01 | Stop reason: SDUPTHER

## 2019-08-01 ENCOUNTER — OFFICE VISIT (OUTPATIENT)
Dept: INTERNAL MEDICINE | Facility: CLINIC | Age: 84
End: 2019-08-01
Payer: MEDICARE

## 2019-08-01 VITALS
OXYGEN SATURATION: 97 % | WEIGHT: 117.06 LBS | DIASTOLIC BLOOD PRESSURE: 72 MMHG | HEIGHT: 62 IN | SYSTOLIC BLOOD PRESSURE: 102 MMHG | HEART RATE: 72 BPM | BODY MASS INDEX: 21.54 KG/M2

## 2019-08-01 DIAGNOSIS — Z79.1 NSAID LONG-TERM USE: ICD-10-CM

## 2019-08-01 DIAGNOSIS — F02.80 LATE ONSET ALZHEIMER'S DISEASE WITHOUT BEHAVIORAL DISTURBANCE: ICD-10-CM

## 2019-08-01 DIAGNOSIS — E55.9 VITAMIN D DEFICIENCY DISEASE: ICD-10-CM

## 2019-08-01 DIAGNOSIS — M81.0 OSTEOPOROSIS, POST-MENOPAUSAL: Primary | ICD-10-CM

## 2019-08-01 DIAGNOSIS — E78.5 HYPERLIPIDEMIA, UNSPECIFIED HYPERLIPIDEMIA TYPE: ICD-10-CM

## 2019-08-01 DIAGNOSIS — G30.1 LATE ONSET ALZHEIMER'S DISEASE WITHOUT BEHAVIORAL DISTURBANCE: ICD-10-CM

## 2019-08-01 DIAGNOSIS — I70.0 ATHEROSCLEROSIS OF AORTIC ARCH: ICD-10-CM

## 2019-08-01 PROCEDURE — 99214 PR OFFICE/OUTPT VISIT, EST, LEVL IV, 30-39 MIN: ICD-10-PCS | Mod: HCNC,S$GLB,, | Performed by: INTERNAL MEDICINE

## 2019-08-01 PROCEDURE — 99214 OFFICE O/P EST MOD 30 MIN: CPT | Mod: HCNC,S$GLB,, | Performed by: INTERNAL MEDICINE

## 2019-08-01 PROCEDURE — 1101F PR PT FALLS ASSESS DOC 0-1 FALLS W/OUT INJ PAST YR: ICD-10-PCS | Mod: HCNC,CPTII,S$GLB, | Performed by: INTERNAL MEDICINE

## 2019-08-01 PROCEDURE — 99999 PR PBB SHADOW E&M-EST. PATIENT-LVL III: ICD-10-PCS | Mod: PBBFAC,HCNC,, | Performed by: INTERNAL MEDICINE

## 2019-08-01 PROCEDURE — 99999 PR PBB SHADOW E&M-EST. PATIENT-LVL III: CPT | Mod: PBBFAC,HCNC,, | Performed by: INTERNAL MEDICINE

## 2019-08-01 PROCEDURE — 1101F PT FALLS ASSESS-DOCD LE1/YR: CPT | Mod: HCNC,CPTII,S$GLB, | Performed by: INTERNAL MEDICINE

## 2019-08-01 RX ORDER — PRAVASTATIN SODIUM 20 MG/1
20 TABLET ORAL DAILY
Qty: 90 TABLET | Refills: 3 | Status: SHIPPED | OUTPATIENT
Start: 2019-08-01 | End: 2020-08-28

## 2019-08-01 NOTE — PROGRESS NOTES
Subjective:       Patient ID: Lang Kumar is a 86 y.o. female.    Chief Complaint: Establish Care    HPI   New pt to me.   Daughter is Bree Rodriguez..        Broke arm.        C/o occas knee pain.  Not bad enough for knee injection.    Newly dx alzheimers Dz.  Lives at home.  Kids provide 24 h supervision.  Uses cane.      Has osteoporosis.  Took fosamax in past. Unclear for how long.      Daughter is requesting meloxicam refill.    Daughter however is giving pt ibuprofen 400 mg few times a week.    She atherosclerosis of aorta, taking pravastatin and asa.  Review of Systems   Constitutional: Negative for fever and unexpected weight change.   HENT: Negative for congestion and postnasal drip.    Eyes: Negative for pain, discharge and visual disturbance.   Respiratory: Negative for cough, chest tightness, shortness of breath and wheezing.    Cardiovascular: Negative for chest pain and leg swelling.   Gastrointestinal: Negative for abdominal pain, constipation, diarrhea and nausea.   Genitourinary: Negative for difficulty urinating, dysuria and hematuria.   Skin: Negative for rash.   Neurological: Negative for headaches.   Psychiatric/Behavioral: Negative for dysphoric mood and sleep disturbance. The patient is not nervous/anxious.        Objective:      Physical Exam   Constitutional: She is oriented to person, place, and time. She appears well-developed and well-nourished. No distress.   Eyes: No scleral icterus.   Neck: No JVD present. No thyromegaly present.   Cardiovascular: Normal rate, regular rhythm and normal heart sounds.   Pulmonary/Chest: Effort normal and breath sounds normal. No respiratory distress. She has no wheezes. She has no rales.   Abdominal: Soft. She exhibits no mass. There is no tenderness.   Musculoskeletal: She exhibits no edema.   Neurological: She is alert and oriented to person, place, and time.   Psychiatric: She has a normal mood and affect. Her behavior is normal.   Oriented to  place, year, month.       Assessment:       1. Osteoporosis, post-menopausal    2. Atherosclerosis of aortic arch    3. Vitamin D deficiency disease    4. Hyperlipidemia, unspecified hyperlipidemia type    5. NSAID long-term use    6. Late onset Alzheimer's disease without behavioral disturbance        Plan:       Lang was seen today for establish care.    Diagnoses and all orders for this visit:    Osteoporosis, post-menopausal  -     Comprehensive metabolic panel; Future  -     DXA Bone Density Spine And Hip; Future    Atherosclerosis of aortic arch    Vitamin D deficiency disease  -     Vitamin D; Future    Hyperlipidemia, unspecified hyperlipidemia type  -     Lipid panel; Future    NSAID long-term use  -     CBC auto differential; Future    Late onset Alzheimer's disease without behavioral disturbance       Do not restart meloxicam.    Use tylenol preferentially.  Ibuprofen occasionally , if labs ok.  Warned of potential side effects.

## 2019-08-02 ENCOUNTER — PATIENT OUTREACH (OUTPATIENT)
Dept: ADMINISTRATIVE | Facility: OTHER | Age: 84
End: 2019-08-02

## 2019-08-05 ENCOUNTER — OFFICE VISIT (OUTPATIENT)
Dept: NEUROLOGY | Facility: CLINIC | Age: 84
End: 2019-08-05
Payer: MEDICARE

## 2019-08-05 VITALS
HEART RATE: 66 BPM | DIASTOLIC BLOOD PRESSURE: 60 MMHG | BODY MASS INDEX: 21.16 KG/M2 | SYSTOLIC BLOOD PRESSURE: 126 MMHG | HEIGHT: 62 IN | WEIGHT: 115 LBS

## 2019-08-05 DIAGNOSIS — G30.9 ALZHEIMER'S DEMENTIA WITHOUT BEHAVIORAL DISTURBANCE, UNSPECIFIED TIMING OF DEMENTIA ONSET: Primary | ICD-10-CM

## 2019-08-05 DIAGNOSIS — F02.80 ALZHEIMER'S DEMENTIA WITHOUT BEHAVIORAL DISTURBANCE, UNSPECIFIED TIMING OF DEMENTIA ONSET: Primary | ICD-10-CM

## 2019-08-05 PROCEDURE — 99999 PR PBB SHADOW E&M-EST. PATIENT-LVL III: CPT | Mod: PBBFAC,HCNC,, | Performed by: PSYCHIATRY & NEUROLOGY

## 2019-08-05 PROCEDURE — 1101F PT FALLS ASSESS-DOCD LE1/YR: CPT | Mod: HCNC,CPTII,S$GLB, | Performed by: PSYCHIATRY & NEUROLOGY

## 2019-08-05 PROCEDURE — 99999 PR PBB SHADOW E&M-EST. PATIENT-LVL III: ICD-10-PCS | Mod: PBBFAC,HCNC,, | Performed by: PSYCHIATRY & NEUROLOGY

## 2019-08-05 PROCEDURE — 99214 PR OFFICE/OUTPT VISIT, EST, LEVL IV, 30-39 MIN: ICD-10-PCS | Mod: HCNC,S$GLB,, | Performed by: PSYCHIATRY & NEUROLOGY

## 2019-08-05 PROCEDURE — 1101F PR PT FALLS ASSESS DOC 0-1 FALLS W/OUT INJ PAST YR: ICD-10-PCS | Mod: HCNC,CPTII,S$GLB, | Performed by: PSYCHIATRY & NEUROLOGY

## 2019-08-05 PROCEDURE — 99214 OFFICE O/P EST MOD 30 MIN: CPT | Mod: HCNC,S$GLB,, | Performed by: PSYCHIATRY & NEUROLOGY

## 2019-08-05 RX ORDER — MEMANTINE HYDROCHLORIDE 5 MG-10 MG
KIT ORAL
Qty: 1 TABLET | Refills: 0 | Status: SHIPPED | OUTPATIENT
Start: 2019-08-05 | End: 2019-09-08

## 2019-08-05 NOTE — PATIENT INSTRUCTIONS
Please titrate namenda as follows:    5mg/day for 1 wk, 5mg twice daily for 1 wk, 15mg/day given in 5mg and 10mg  doses for 1 wk, 10mg twice daily and stay at this dose    If you experience side effects to this medication please call [phone] and let us know
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
## 2019-08-19 ENCOUNTER — HOSPITAL ENCOUNTER (OUTPATIENT)
Dept: RADIOLOGY | Facility: CLINIC | Age: 84
Discharge: HOME | End: 2019-08-19
Attending: INTERNAL MEDICINE
Payer: MEDICARE

## 2019-08-19 DIAGNOSIS — M81.0 OSTEOPOROSIS, POST-MENOPAUSAL: ICD-10-CM

## 2019-08-19 PROCEDURE — 77080 DEXA BONE DENSITY SPINE HIP: ICD-10-PCS | Mod: 26,HCNC,, | Performed by: INTERNAL MEDICINE

## 2019-08-19 PROCEDURE — 77080 DXA BONE DENSITY AXIAL: CPT | Mod: TC,HCNC,PO

## 2019-08-19 PROCEDURE — 77080 DXA BONE DENSITY AXIAL: CPT | Mod: 26,HCNC,, | Performed by: INTERNAL MEDICINE

## 2019-08-20 DIAGNOSIS — E78.5 HYPERLIPIDEMIA, UNSPECIFIED HYPERLIPIDEMIA TYPE: ICD-10-CM

## 2019-08-20 RX ORDER — PRAVASTATIN SODIUM 20 MG/1
TABLET ORAL
Qty: 30 TABLET | Refills: 0 | OUTPATIENT
Start: 2019-08-20

## 2019-08-22 DIAGNOSIS — E78.5 HYPERLIPIDEMIA, UNSPECIFIED HYPERLIPIDEMIA TYPE: ICD-10-CM

## 2019-08-24 RX ORDER — PRAVASTATIN SODIUM 20 MG/1
TABLET ORAL
Qty: 30 TABLET | Refills: 0 | Status: SHIPPED | OUTPATIENT
Start: 2019-08-24 | End: 2020-05-07

## 2019-08-25 RX ORDER — ALENDRONATE SODIUM 70 MG/1
70 TABLET ORAL
Qty: 4 TABLET | Refills: 11 | Status: SHIPPED | OUTPATIENT
Start: 2019-08-25 | End: 2019-08-27 | Stop reason: SDUPTHER

## 2019-08-27 ENCOUNTER — TELEPHONE (OUTPATIENT)
Dept: PRIMARY CARE CLINIC | Facility: CLINIC | Age: 84
End: 2019-08-27

## 2019-08-27 RX ORDER — ALENDRONATE SODIUM 70 MG/1
TABLET ORAL
Qty: 4 TABLET | Refills: 11 | Status: SHIPPED | OUTPATIENT
Start: 2019-08-27 | End: 2020-05-07

## 2019-08-27 NOTE — TELEPHONE ENCOUNTER
Notified Bharti (pt's daughter) of the following message from PCP-----     Message from Ursula Connelly MD sent at 8/25/2019  9:47 PM CDT -----  Pls call daughter:  Ms Kumar does have significant osteoporosis, for which I'd recommend restarting fosamax as long as 1) she doesn't have difficulty swallowing pills .                         2) teeth are healthy and upcoming major dental work, like implants, is not anticipated.     Take Fosamax on an empty stomach once a week with 8 oz water.  Do not eat and remain upright for 30 minutes.            She also should take  Calcium 600 mg twice a day.  Continue prescription vit d (ergocalciferol).       Labs were fine      NE

## 2019-09-08 DIAGNOSIS — G30.9 ALZHEIMER'S DEMENTIA WITHOUT BEHAVIORAL DISTURBANCE, UNSPECIFIED TIMING OF DEMENTIA ONSET: ICD-10-CM

## 2019-09-08 DIAGNOSIS — F02.80 ALZHEIMER'S DEMENTIA WITHOUT BEHAVIORAL DISTURBANCE, UNSPECIFIED TIMING OF DEMENTIA ONSET: ICD-10-CM

## 2019-09-08 RX ORDER — MEMANTINE HYDROCHLORIDE 10 MG/1
10 TABLET ORAL 2 TIMES DAILY
Qty: 180 TABLET | Refills: 3 | Status: SHIPPED | OUTPATIENT
Start: 2019-09-08 | End: 2020-06-04

## 2019-09-08 RX ORDER — MEMANTINE HYDROCHLORIDE 5 MG-10 MG
KIT ORAL
Qty: 49 TABLET | Refills: 0 | OUTPATIENT
Start: 2019-09-08

## 2019-09-08 NOTE — TELEPHONE ENCOUNTER
pls call daughter - once she finishes namenda titration pack (which she should have done) she should take namenda 10 mg twice a day.     pls ask pharmacist to cx titration pack refill.

## 2019-09-23 ENCOUNTER — PATIENT OUTREACH (OUTPATIENT)
Dept: ADMINISTRATIVE | Facility: OTHER | Age: 84
End: 2019-09-23

## 2019-09-26 ENCOUNTER — OFFICE VISIT (OUTPATIENT)
Dept: NEUROLOGY | Facility: CLINIC | Age: 84
End: 2019-09-26
Payer: MEDICARE

## 2019-09-26 VITALS
SYSTOLIC BLOOD PRESSURE: 132 MMHG | WEIGHT: 115.06 LBS | DIASTOLIC BLOOD PRESSURE: 60 MMHG | BODY MASS INDEX: 21.17 KG/M2 | HEIGHT: 62 IN | HEART RATE: 69 BPM

## 2019-09-26 DIAGNOSIS — F03.90 DEMENTIA WITHOUT BEHAVIORAL DISTURBANCE, UNSPECIFIED DEMENTIA TYPE: ICD-10-CM

## 2019-09-26 PROCEDURE — 1101F PT FALLS ASSESS-DOCD LE1/YR: CPT | Mod: HCNC,CPTII,S$GLB, | Performed by: PSYCHIATRY & NEUROLOGY

## 2019-09-26 PROCEDURE — 99214 OFFICE O/P EST MOD 30 MIN: CPT | Mod: HCNC,S$GLB,, | Performed by: PSYCHIATRY & NEUROLOGY

## 2019-09-26 PROCEDURE — 99999 PR PBB SHADOW E&M-EST. PATIENT-LVL III: ICD-10-PCS | Mod: PBBFAC,HCNC,, | Performed by: PSYCHIATRY & NEUROLOGY

## 2019-09-26 PROCEDURE — 99214 PR OFFICE/OUTPT VISIT, EST, LEVL IV, 30-39 MIN: ICD-10-PCS | Mod: HCNC,S$GLB,, | Performed by: PSYCHIATRY & NEUROLOGY

## 2019-09-26 PROCEDURE — 99999 PR PBB SHADOW E&M-EST. PATIENT-LVL III: CPT | Mod: PBBFAC,HCNC,, | Performed by: PSYCHIATRY & NEUROLOGY

## 2019-09-26 PROCEDURE — 1101F PR PT FALLS ASSESS DOC 0-1 FALLS W/OUT INJ PAST YR: ICD-10-PCS | Mod: HCNC,CPTII,S$GLB, | Performed by: PSYCHIATRY & NEUROLOGY

## 2019-09-26 RX ORDER — DONEPEZIL HYDROCHLORIDE 10 MG/1
10 TABLET, FILM COATED ORAL NIGHTLY
Qty: 30 TABLET | Refills: 6 | Status: SHIPPED | OUTPATIENT
Start: 2019-09-26 | End: 2020-05-05
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
## 2020-05-05 ENCOUNTER — TELEPHONE (OUTPATIENT)
Dept: INTERNAL MEDICINE | Facility: CLINIC | Age: 85
End: 2020-05-05

## 2020-05-05 DIAGNOSIS — F03.90 DEMENTIA WITHOUT BEHAVIORAL DISTURBANCE, UNSPECIFIED DEMENTIA TYPE: ICD-10-CM

## 2020-05-05 DIAGNOSIS — N81.10 BLADDER PROLAPSE, FEMALE, ACQUIRED: ICD-10-CM

## 2020-05-05 DIAGNOSIS — R35.0 URINARY FREQUENCY: Primary | ICD-10-CM

## 2020-05-05 RX ORDER — DONEPEZIL HYDROCHLORIDE 10 MG/1
TABLET, FILM COATED ORAL
Qty: 90 TABLET | Refills: 0 | Status: SHIPPED | OUTPATIENT
Start: 2020-05-05 | End: 2020-08-13 | Stop reason: SDUPTHER

## 2020-05-05 RX ORDER — DONEPEZIL HYDROCHLORIDE 10 MG/1
TABLET, FILM COATED ORAL
Qty: 30 TABLET | Refills: 1 | Status: SHIPPED | OUTPATIENT
Start: 2020-05-05 | End: 2020-05-05

## 2020-05-05 NOTE — TELEPHONE ENCOUNTER
Denies pain or burning upon urination. No c/o odor or changes in sensorium. Pt has a hx of a prolapsed bladder 2018. Her daughter says the frequency is the worst. Nobody's getting any rest and Mom is going thru the Depends so fast.

## 2020-05-05 NOTE — TELEPHONE ENCOUNTER
----- Message from Jolynn Gandhi sent at 5/5/2020 10:46 AM CDT -----  Contact: [phone] Daughter Bharti  Would like to get medical advice.  Symptoms (please be specific):  Frequently urination, more than normal (no burning, no pain)  How long has patient had these symptoms: couple of weeks  Pharmacy name and phone #:  [id number] [address] [facility] [phone] (Phone)  [phone] (Fax)  Any drug allergies (copy from chart):      Would the patient rather a call back or a response via [facility]?:  Call back  Comments:

## 2020-05-05 NOTE — TELEPHONE ENCOUNTER
Called to schedule pt but they were unable to. Says Urology staff schedules their appointments. They will reach out to pt and get back with us.

## 2020-05-07 ENCOUNTER — TELEPHONE (OUTPATIENT)
Dept: INTERNAL MEDICINE | Facility: CLINIC | Age: 85
End: 2020-05-07

## 2020-05-07 ENCOUNTER — OFFICE VISIT (OUTPATIENT)
Dept: INTERNAL MEDICINE | Facility: CLINIC | Age: 85
End: 2020-05-07
Payer: MEDICARE

## 2020-05-07 DIAGNOSIS — F03.90 DEMENTIA WITHOUT BEHAVIORAL DISTURBANCE, UNSPECIFIED DEMENTIA TYPE: ICD-10-CM

## 2020-05-07 DIAGNOSIS — R35.0 URINARY FREQUENCY: Primary | ICD-10-CM

## 2020-05-07 PROCEDURE — 99441 PR PHYSICIAN TELEPHONE EVALUATION 5-10 MIN: CPT | Mod: HCNC,95,, | Performed by: INTERNAL MEDICINE

## 2020-05-07 PROCEDURE — 99441 PR PHYSICIAN TELEPHONE EVALUATION 5-10 MIN: ICD-10-PCS | Mod: HCNC,95,, | Performed by: INTERNAL MEDICINE

## 2020-05-07 RX ORDER — OXYBUTYNIN CHLORIDE 5 MG/1
TABLET, EXTENDED RELEASE ORAL
Qty: 60 TABLET | Refills: 2 | Status: SHIPPED | OUTPATIENT
Start: 2020-05-07 | End: 2020-06-05 | Stop reason: ALTCHOICE

## 2020-05-07 RX ORDER — ALENDRONATE SODIUM 70 MG/1
TABLET ORAL
Qty: 4 TABLET | Refills: 11
Start: 2020-05-07 | End: 2021-01-01

## 2020-05-07 NOTE — TELEPHONE ENCOUNTER
I tried to call the daughter back just now with no answer. Do you want me to just schedule a audio call for you to call her??    * spoke to pt daughter audio visit is scheduled for 1:40 today

## 2020-05-07 NOTE — TELEPHONE ENCOUNTER
Pt daughter calling in regards to mom urinating more thank normal going through 7-8 pampers in one night. This is making it difficult to get a full nights sleep. pts mom has an appointment with urologist on May 21st. Pts daughter wants to know if there is something she can take to help with the urination in the mean time of waiting the 2 weeks.

## 2020-05-07 NOTE — PROGRESS NOTES
Established Patient - Audio Only Telehealth Visit     The patient location is: home  The chief complaint leading to consultation is: urinary frequency  Visit type: Virtual visit with audio only (telephone)  Total time spent with patient: 10 minutes       The reason for the audio only service rather than synchronous audio and video virtual visit was related to technical difficulties or patient preference/necessity.     Each patient to whom I provide medical services by telemedicine is:  (1) informed of the relationship between the physician and patient and the respective role of any other health care provider with respect to management of the patient; and (2) notified that they may decline to receive medical services by telemedicine and may withdraw from such care at any time. Patient verbally consented to receive this service via voice-only telephone call.     204  Subjective:       Patient ID: Lang Kumar is a 87 y.o. female.    Chief Complaint: urinary frequency  HPI daughter gave hx.  Daughter C/o constant urination.  5-6 times at night x 1 week..  No dysuria. Daughter believes urine does not smell.   S/p bladder surgery in 2014, as tx for ur frequency: [place] Colpocleisis, perineorrhaphy, posterior repair.  No fever.     She is taking namenda and aricept.   Daughter feels that dementia is progressing and is uncertain about benefit of medication.    Review of Systems   Constitutional: Negative for fever and unexpected weight change.   HENT: Negative for congestion and postnasal drip.    Eyes: Negative for pain, discharge and visual disturbance.   Respiratory: Negative for cough, chest tightness, shortness of breath and wheezing.    Cardiovascular: Negative for chest pain and leg swelling.   Gastrointestinal: Negative for abdominal pain, constipation, diarrhea and nausea.   Genitourinary: Positive for frequency. Negative for difficulty urinating, dysuria and hematuria.   Skin: Negative for rash.    Neurological: Negative for headaches.   Psychiatric/Behavioral: Negative for dysphoric mood and sleep disturbance. The patient is not nervous/anxious.        Objective:      Physical Exam   Constitutional: She is oriented to person, place, and time. She appears well-developed and well-nourished. No distress.   Neurological: She is alert and oriented to person, place, and time.   Psychiatric: She has a normal mood and affect. Her behavior is normal.       Assessment:       1. Urinary frequency    2. Dementia without behavioral disturbance, unspecified dementia type        Plan:       Diagnoses and all orders for this visit:    Urinary frequency    Dementia without behavioral disturbance, unspecified dementia type    Other orders  -     alendronate (FOSAMAX) 70 MG tablet; Take on an empty stomach once a week with 8 oz water.  Do not eat and remain upright for 30 minutes.  -     oxybutynin (DITROPAN-XL) 5 MG TR24; 1-2 tabs po q day for bladder            We discussed potential of oxybutynin negating benefit of dementia drugs, but due to severity of symptoms she would like to proceed.       Urology appt upcoming.  Urine will be tested then.                                                  This service was not originating from a related E/M service provided within the previous 7 days nor will  to an E/M service or procedure within the next 24 hours or my soonest available appointment.  Prevailing standard of care was able to be met in this audio-only visit.

## 2020-05-07 NOTE — TELEPHONE ENCOUNTER
----- Message from Leonela Hall sent at 5/7/2020 11:36 AM CDT -----  Contact: Bharti  [phone]  Type: Returning a call    Who left a message? Darby    When did the practice call? today    Comments: Please call Bharti back regarding earlier conversation, she's waiting for your call back    thanks

## 2020-05-07 NOTE — TELEPHONE ENCOUNTER
Pt daughter wants to know if her mom has to be present in time of the appointment before scheduling. Pt daughter states that pt has dementia and doesn't really know what's going on.

## 2020-05-07 NOTE — TELEPHONE ENCOUNTER
----- Message from Laly Kramer sent at 5/7/2020  9:02 AM CDT -----  Contact: Bharti/daughter/[phone]  Bharti called in regards needing to talk with someone in the office in regards her mom (did not want to specify). Please call and advise. Thank you

## 2020-05-19 ENCOUNTER — PATIENT OUTREACH (OUTPATIENT)
Dept: ADMINISTRATIVE | Facility: OTHER | Age: 85
End: 2020-05-19

## 2020-05-21 ENCOUNTER — OFFICE VISIT (OUTPATIENT)
Dept: UROLOGY | Facility: CLINIC | Age: 85
End: 2020-05-21
Payer: MEDICARE

## 2020-05-21 VITALS
HEART RATE: 64 BPM | DIASTOLIC BLOOD PRESSURE: 66 MMHG | BODY MASS INDEX: 23.09 KG/M2 | HEIGHT: 63 IN | SYSTOLIC BLOOD PRESSURE: 145 MMHG | WEIGHT: 130.31 LBS

## 2020-05-21 DIAGNOSIS — Z98.890 STATUS POST UROLOGICAL SURGERY: ICD-10-CM

## 2020-05-21 DIAGNOSIS — R35.0 URINARY FREQUENCY: ICD-10-CM

## 2020-05-21 DIAGNOSIS — N30.90 BLADDER INFECTION: Primary | ICD-10-CM

## 2020-05-21 PROCEDURE — 1159F PR MEDICATION LIST DOCUMENTED IN MEDICAL RECORD: ICD-10-PCS | Mod: HCNC,S$GLB,, | Performed by: UROLOGY

## 2020-05-21 PROCEDURE — 87077 CULTURE AEROBIC IDENTIFY: CPT | Mod: HCNC

## 2020-05-21 PROCEDURE — 87088 URINE BACTERIA CULTURE: CPT | Mod: HCNC

## 2020-05-21 PROCEDURE — 1126F PR PAIN SEVERITY QUANTIFIED, NO PAIN PRESENT: ICD-10-PCS | Mod: HCNC,S$GLB,, | Performed by: UROLOGY

## 2020-05-21 PROCEDURE — 1101F PT FALLS ASSESS-DOCD LE1/YR: CPT | Mod: HCNC,CPTII,S$GLB, | Performed by: UROLOGY

## 2020-05-21 PROCEDURE — 81002 PR URINALYSIS NONAUTO W/O SCOPE: ICD-10-PCS | Mod: HCNC,S$GLB,, | Performed by: UROLOGY

## 2020-05-21 PROCEDURE — 1101F PR PT FALLS ASSESS DOC 0-1 FALLS W/OUT INJ PAST YR: ICD-10-PCS | Mod: HCNC,CPTII,S$GLB, | Performed by: UROLOGY

## 2020-05-21 PROCEDURE — 87186 SC STD MICRODIL/AGAR DIL: CPT | Mod: HCNC

## 2020-05-21 PROCEDURE — 1159F MED LIST DOCD IN RCRD: CPT | Mod: HCNC,S$GLB,, | Performed by: UROLOGY

## 2020-05-21 PROCEDURE — 99205 PR OFFICE/OUTPT VISIT, NEW, LEVL V, 60-74 MIN: ICD-10-PCS | Mod: 25,HCNC,S$GLB, | Performed by: UROLOGY

## 2020-05-21 PROCEDURE — 51701 INSERT BLADDER CATHETER: CPT | Mod: HCNC,S$GLB,, | Performed by: UROLOGY

## 2020-05-21 PROCEDURE — 99999 PR PBB SHADOW E&M-EST. PATIENT-LVL III: ICD-10-PCS | Mod: PBBFAC,HCNC,, | Performed by: UROLOGY

## 2020-05-21 PROCEDURE — 99205 OFFICE O/P NEW HI 60 MIN: CPT | Mod: 25,HCNC,S$GLB, | Performed by: UROLOGY

## 2020-05-21 PROCEDURE — 81002 URINALYSIS NONAUTO W/O SCOPE: CPT | Mod: HCNC,S$GLB,, | Performed by: UROLOGY

## 2020-05-21 PROCEDURE — 1126F AMNT PAIN NOTED NONE PRSNT: CPT | Mod: HCNC,S$GLB,, | Performed by: UROLOGY

## 2020-05-21 PROCEDURE — 99999 PR PBB SHADOW E&M-EST. PATIENT-LVL III: CPT | Mod: PBBFAC,HCNC,, | Performed by: UROLOGY

## 2020-05-21 PROCEDURE — 87086 URINE CULTURE/COLONY COUNT: CPT | Mod: HCNC

## 2020-05-21 PROCEDURE — 51701 PR INSERTION OF NON-INDWELLING BLADDER CATHETERIZATION FOR RESIDUAL UR: ICD-10-PCS | Mod: HCNC,S$GLB,, | Performed by: UROLOGY

## 2020-05-21 RX ORDER — DOXYCYCLINE 100 MG/1
100 CAPSULE ORAL 2 TIMES DAILY
Qty: 14 CAPSULE | Refills: 0 | Status: SHIPPED | OUTPATIENT
Start: 2020-05-21 | End: 2020-05-28

## 2020-05-21 NOTE — PROGRESS NOTES
CHIEF COMPLAINT:    Mrs. Kumar is a 87 y.o. female presenting for a consultation at the request of Dr. Ursula Connelly. Patient presents with urgency, frequency.    PRESENTING ILLNESS:    Lang Kumar is a 87 y.o. female who I last saw in December of 2014.  She was status post a Le Forte Colpocleisis, perineorrhaphy and posterior repair at that time.  The patient previously had recurrent UTI's.  In the past 4 weeks, she has developed frequency, urgency and nocturia.  She also has dementia.  She goes through many pull ups (can use a package of 44 in 2 days) but many times they are dry.  Prior to 1 month ago, the frequency was reasonable.  She also has nocturia x 5-6.  Again, she was able to sleep before.  She lives with her daughters.  When asked, the patient denies any dysuria.  She has not had any fevers or chills.  Dementia has not worsened during this time.  The history is as per the patient's daughter.     She is here with her daughter who lives in [facility].  She states that she gives her mother melatonin but the patient does not stay asleep.  She does get outside for walks and sits on the patio when the weather is nice.     REVIEW OF SYSTEMS:    Review of Systems   Constitutional: Negative.    HENT: Negative.    Eyes: Negative.    Respiratory: Negative.    Cardiovascular: Negative.    Gastrointestinal: Negative.    Genitourinary: Positive for frequency and urgency.   Skin: Negative.    Neurological: Negative.    Endo/Heme/Allergies: Negative.    Psychiatric/Behavioral: Positive for memory loss.       PATIENT HISTORY:    Past Medical History:   Diagnosis Date    Abnormal exam or test finding 12/2011    coronary calcium score 204 = moderate plaque burden and high coronary heart disease risk    Arthritis     Atherosclerosis of aortic arch     - noted on CXR 11/2016    Borderline hyperlipidemia     Coronary artery disease involving native coronary artery of native heart without angina pectoris 11/30/2016     Major neurocognitive disorder due to multiple etiologies     Osteoporosis, post-menopausal     Urge and stress incontinence     uses pessary; followed by gynecology    Uterine prolapse     Vitamin D deficiency disease        Past Surgical History:   Procedure Laterality Date    CATARACT EXTRACTION, BILATERAL      COSMETIC SURGERY      OPEN REDUCTION AND INTERNAL FIXATION (ORIF) OF FRACTURE OF DISTAL HUMERUS Left 2018    Procedure: ORIF, FRACTURE, HUMERUS, DISTAL, LEFT;  Surgeon: Sonu Carmona MD;  Location: [facility] OR [address] [place] [address];  Service: Orthopedics;  Laterality: Left;       Family History   Problem Relation Age of Onset    Lung cancer Sister         smoker    Coronary artery disease Father     Stroke Mother     Fibromyalgia Sister     Arthritis Sister         back and knee      Socioeconomic History    Marital status:     Number of children: 3   Occupational History    Occupation: teacher - retired   Tobacco Use    Smoking status: Former Smoker     Packs/day: 0.50     Types: Cigarettes     Last attempt to quit: 1970     Years since quittin.0    Smokeless tobacco: Never Used   Substance and Sexual Activity    Alcohol use: Not Currently     Comment: once a week    Drug use: No    Sexual activity: Never       Allergies:  Patient has no known allergies.    Medications:  Outpatient Encounter Medications as of 2020   Medication Sig Dispense Refill    alendronate (FOSAMAX) 70 MG tablet Take on an empty stomach once a week with 8 oz water.  Do not eat and remain upright for 30 minutes. 4 tablet 11    aspirin (ECOTRIN) 81 MG EC tablet Take 1 tablet (81 mg total) by mouth once daily. 90 tablet 3    donepeziL (ARICEPT) 10 MG tablet TAKE 1 TABLET(10 MG) BY MOUTH EVERY EVENING 90 tablet 0    ergocalciferol (VITAMIN D2) 50,000 unit Cap Take 1 capsule (50,000 Units total) by mouth every 7 days. 4 capsule 11    memantine (NAMENDA) 10 MG Tab Take 1 tablet (10 mg total) by  mouth 2 (two) times daily. 180 tablet 3    multivitamin (ONE DAILY MULTIVITAMIN) per tablet Take 1 tablet by mouth once daily.      oxybutynin (DITROPAN-XL) 5 MG TR24 1-2 tabs po q day for bladder 60 tablet 2    pravastatin (PRAVACHOL) 20 MG tablet Take 1 tablet (20 mg total) by mouth once daily. 90 tablet 3    doxycycline (MONODOX) 100 MG capsule Take 1 capsule (100 mg total) by mouth 2 (two) times daily. Use sunscreen or cover and continue 1 week after completed. for 7 days 14 capsule 0     No facility-administered encounter medications on file as of 5/21/2020.          PHYSICAL EXAMINATION:    The patient generally appears in good health, is in no apparent distress.    Skin: No lesions.    Mental: Cooperative with normal affect.    Neuro: Grossly intact.    HEENT: Normal. No evidence of lymphadenopathy.    Chest:  normal inspiratory effort.    Abdomen:  Soft, non-tender. No masses or organomegaly. Bladder is not palpable. No evidence of flank discomfort. No evidence of inguinal hernia.    Extremities: No clubbing, cyanosis, or edema    Normal external female genitalia  Vaginal atrophy  Introitus is narrowed, consistent with history of colpocleisis and perineiorrhaphy  Urethral meatus is normal  Amount in bladder was 60 ml    LABS:    Lab Results   Component Value Date    BUN 14 08/19/2019    CREATININE 0.7 08/19/2019     UA 1.015, pH 6, tr leuk, + nitrite, tr blood, otherwise, negative    IMPRESSION:    Encounter Diagnoses   Name Primary?    Bladder infection Yes    Urinary frequency     Status post urological surgery        PLAN:    1.  The catheterized specimen was sent for culture  2.  Doxycycline 100 mg bid sent empirically  3.  Hold the oxybutynin for now to monitor response to the antibiotic.  If this is secondary to the bladder infection, no additional antimuscarinic will be needed.  Discussed it was reasonable to prescribe until the patient could come to this appointment.     Copy to: Dr. Vergara  Godwin

## 2020-05-21 NOTE — LETTER
May 21, 2020      Ursula Connelly MD  [address]  [place] [address]           [facility] - Urology 4th Floor  [address]  [facility] [address]  Phone: [phone]          Patient: Lang Kumar   MR Number: [id number]   YOB: 1932   Date of Visit: 5/21/2020       Dear Dr. Ursula Connelly:    Thank you for referring Lang Kumar to me for evaluation. Attached you will find relevant portions of my assessment and plan of care.    If you have questions, please do not hesitate to call me. I look forward to following Lang Kumar along with you.    Sincerely,    Abbey Brice MD    Enclosure  CC:  No Recipients    If you would like to receive this communication electronically, please contact [email or web address] or [phone] to request more information on [facility] Link access.    For providers and/or their staff who would like to refer a patient to [facility], please contact us through our one-stop-shop provider referral line, [facility] , at [phone].    If you feel you have received this communication in error or would no longer like to receive these types of communications, please e-mail [email or web address]

## 2020-05-23 LAB — BACTERIA UR CULT: ABNORMAL

## 2020-05-25 ENCOUNTER — TELEPHONE (OUTPATIENT)
Dept: UROLOGY | Facility: CLINIC | Age: 85
End: 2020-05-25

## 2020-05-25 NOTE — TELEPHONE ENCOUNTER
----- Message from Abbey Brice MD sent at 5/25/2020  8:50 AM CDT -----  Please let her know that it was sensitive to doxycycline.  Have her monitor her response to therapy (see if the urgency/frequency symptoms get better)  Thanks.

## 2020-06-01 ENCOUNTER — TELEPHONE (OUTPATIENT)
Dept: UROLOGY | Facility: CLINIC | Age: 85
End: 2020-06-01

## 2020-06-01 NOTE — TELEPHONE ENCOUNTER
----- Message from Jolynn Meredith sent at 6/1/2020  3:21 PM CDT -----  Type:  Needs Medical Advice    Who Called: Bharti/daughter  Symptoms (please be specific): uti--overactive bladder  How long has patient had these symptoms: 1 month  Pharmacy name and phone #:[facility] [phone]  Would the patient rather a call back or a response via [facility]? call  Best Call Back Number: [phone]  Additional Information: no

## 2020-06-01 NOTE — CONSULTS
Consult Note  Orthopaedics    SUBJECTIVE:     History of Present Illness:  Patient is a 85 y.o. female who presents after a fall onto her left arm with immediate pain in the left elbow. Denies numbness, tingling. No other injuries during the fall. Is overall in good health last ate 930 pm last night.     Scheduled Meds:  Continuous Infusions:  PRN Meds:    Review of patient's allergies indicates:  No Known Allergies    Past Medical History:   Diagnosis Date    Abnormal exam or test finding 2011    coronary calcium score 204 = moderate plaque burden and high coronary heart disease risk    Arthritis     Atherosclerosis of aortic arch     - noted on CXR 2016    Borderline hyperlipidemia     Coronary artery disease involving native coronary artery of native heart without angina pectoris 2016    Osteoporosis, post-menopausal     Urge and stress incontinence     uses pessary; followed by gynecology    Uterine prolapse     Vitamin D deficiency disease      Past Surgical History:   Procedure Laterality Date    CATARACT EXTRACTION, BILATERAL      COSMETIC SURGERY       Family History   Problem Relation Age of Onset    Lung cancer Sister         smoker    Coronary artery disease Father     Stroke Mother     Fibromyalgia Sister     Arthritis Sister         back and knee     Breast cancer Neg Hx     Ovarian cancer Neg Hx     Diabetes Neg Hx     Colon cancer Neg Hx      Social History     Tobacco Use    Smoking status: Former Smoker     Packs/day: 0.50     Types: Cigarettes     Last attempt to quit: 1970     Years since quittin.2    Smokeless tobacco: Never Used   Substance Use Topics    Alcohol use: Yes     Alcohol/week: 1.2 oz     Types: 2 Glasses of wine per week     Comment: once a week    Drug use: No        Review of Systems:  Constitutional: negative for fevers  Eyes: no visual changes  ENT: negative for hearing loss  Respiratory: negative for dyspnea  Cardiovascular: negative  SPoke with pt's mother Nannette and provided update. Discussed pt phoning
Nannette over the weekend. Nannette stated that she was thrilled that pt called
her but that pt still is not quite herself. Nannette believes that pt is still
manic. for chest pain  Gastrointestinal: negative for abdominal pain  Genitourinary: negative for dysuria  Neurological: negative for headaches  Behavioral/Psych: negative for hallucinations  Endocrine: negative for temperature intolerance      OBJECTIVE:     Vital Signs (Most Recent)  Temp: 98 °F (36.7 °C) (08/13/18 0303)  Pulse: 70 (08/13/18 0303)  Resp: 18 (08/13/18 0303)  BP: (!) 149/65 (08/13/18 0303)  SpO2: 97 % (08/13/18 0303)    Physical Exam:  Gen:  No acute distress  CV:  Peripherally well-perfused.  Pulses 2+ bilaterally.  Lungs:  Normal respiratory effort.  Abdomen:  Soft, non-tender, non-distended  Head/Neck:  Normocephalic.  Atraumatic. No TTP, AROM and PROM intact without pain  Neuro:  CN intact without deficit, SILT throughout B/L Upper & Lower Extremities  Pelvis: No TTP, Stable to direct anterior pressure over ASIS.    LEFT UPPER EXTREMITY:     INSPECTION  - Skin is intact  PALPATION  - TTP over posterior aspect of elbow  RANGE OF MOTION  - AROM and PROM intact at the wrist  NEUROVASCULAR  - AIN/PIN/Radial/Median/Ulnar Nerves assessed in isolation without deficit  - SILT throughout  - Radial & Ulnar arteries palpated 2+  - Capillary Refill <3s  Laboratory:  No results found for this or any previous visit (from the past 72 hour(s)).    Diagnostic Results:  X-Ray: Reviewed  Left supracondylar humerus fracture.   ASSESSMENT/PLAN:     A/P: Lang DICKSON Valence is a 85 y.o. with left supracondylar humerus fracture, closed and NVI.      Plan:  - Traction view done in ED prior to placing long arm splint. CT scan for operative planning. Consented for ORIF left supracondylar humerus. NPO, NWB SONA. Possible operative fixation today if UA is clean.      Brice Lynch M.D. PGY2  Orthopedic Surgery

## 2020-06-04 ENCOUNTER — PATIENT OUTREACH (OUTPATIENT)
Dept: ADMINISTRATIVE | Facility: OTHER | Age: 85
End: 2020-06-04

## 2020-06-04 RX ORDER — MEMANTINE HYDROCHLORIDE 10 MG/1
TABLET ORAL
Qty: 180 TABLET | Refills: 3 | Status: SHIPPED | OUTPATIENT
Start: 2020-06-04 | End: 2020-10-02 | Stop reason: SDUPTHER

## 2020-06-04 NOTE — PROGRESS NOTES
Refill Routing Note     Medication(s) are not appropriate for processing by [facility]:    Medication Outside of Protocol    Appointments  past 12m or future 3m with PCP    Date Provider   Last Visit   5/7/2020 Ursula Connelly MD   Next Visit   Visit date not found Ursula Connelly MD           Automatic Epic Protocol Generated Data:    Requested Prescriptions   Pending Prescriptions Disp Refills    memantine (NAMENDA) 10 MG Tab [Pharmacy Med Name: MEMANTINE 10MG TABLETS] 180 tablet 3     Sig: TAKE 1 TABLET BY MOUTH TWICE DAILY       There is no refill protocol information for this order           Note composed:6:55 AM 06/04/2020

## 2020-06-05 ENCOUNTER — OFFICE VISIT (OUTPATIENT)
Dept: UROLOGY | Facility: CLINIC | Age: 85
End: 2020-06-05
Payer: MEDICARE

## 2020-06-05 VITALS
SYSTOLIC BLOOD PRESSURE: 140 MMHG | DIASTOLIC BLOOD PRESSURE: 71 MMHG | HEART RATE: 73 BPM | WEIGHT: 130.31 LBS | BODY MASS INDEX: 23.09 KG/M2 | HEIGHT: 63 IN

## 2020-06-05 DIAGNOSIS — R35.0 INCREASED FREQUENCY OF URINATION: ICD-10-CM

## 2020-06-05 DIAGNOSIS — R39.15 URINARY URGENCY: Primary | ICD-10-CM

## 2020-06-05 DIAGNOSIS — N39.41 URGE INCONTINENCE: ICD-10-CM

## 2020-06-05 PROCEDURE — 1126F PR PAIN SEVERITY QUANTIFIED, NO PAIN PRESENT: ICD-10-PCS | Mod: HCNC,S$GLB,, | Performed by: UROLOGY

## 2020-06-05 PROCEDURE — 51701 INSERT BLADDER CATHETER: CPT | Mod: HCNC,S$GLB,, | Performed by: UROLOGY

## 2020-06-05 PROCEDURE — 51701 PR INSERTION OF NON-INDWELLING BLADDER CATHETERIZATION FOR RESIDUAL UR: ICD-10-PCS | Mod: HCNC,S$GLB,, | Performed by: UROLOGY

## 2020-06-05 PROCEDURE — 99999 PR PBB SHADOW E&M-EST. PATIENT-LVL III: CPT | Mod: PBBFAC,HCNC,, | Performed by: UROLOGY

## 2020-06-05 PROCEDURE — 99213 PR OFFICE/OUTPT VISIT, EST, LEVL III, 20-29 MIN: ICD-10-PCS | Mod: HCNC,25,S$GLB, | Performed by: UROLOGY

## 2020-06-05 PROCEDURE — 1159F PR MEDICATION LIST DOCUMENTED IN MEDICAL RECORD: ICD-10-PCS | Mod: HCNC,S$GLB,, | Performed by: UROLOGY

## 2020-06-05 PROCEDURE — 1159F MED LIST DOCD IN RCRD: CPT | Mod: HCNC,S$GLB,, | Performed by: UROLOGY

## 2020-06-05 PROCEDURE — 1101F PR PT FALLS ASSESS DOC 0-1 FALLS W/OUT INJ PAST YR: ICD-10-PCS | Mod: HCNC,CPTII,S$GLB, | Performed by: UROLOGY

## 2020-06-05 PROCEDURE — 1126F AMNT PAIN NOTED NONE PRSNT: CPT | Mod: HCNC,S$GLB,, | Performed by: UROLOGY

## 2020-06-05 PROCEDURE — 1101F PT FALLS ASSESS-DOCD LE1/YR: CPT | Mod: HCNC,CPTII,S$GLB, | Performed by: UROLOGY

## 2020-06-05 PROCEDURE — 99213 OFFICE O/P EST LOW 20 MIN: CPT | Mod: HCNC,25,S$GLB, | Performed by: UROLOGY

## 2020-06-05 PROCEDURE — 99999 PR PBB SHADOW E&M-EST. PATIENT-LVL III: ICD-10-PCS | Mod: PBBFAC,HCNC,, | Performed by: UROLOGY

## 2020-06-05 PROCEDURE — 87086 URINE CULTURE/COLONY COUNT: CPT | Mod: HCNC

## 2020-06-05 NOTE — PROGRESS NOTES
CHIEF COMPLAINT:    Mrs. Kumar is a 87 y.o. female presenting for a follow up on urgency, frequency    PRESENTING ILLNESS:    Lang Kumar is a 87 y.o. female who is accompanied by her daughter.  The urgency and urge incontinence have not improved with treatment for the UTI.  It is unclear how much of this is secondary to behavior or sensation of urgency.  She is now lining the pull up with a pad and encouraging her mother to remove the pad when she goes to the toilet.  The patient is virtually non verbal and all of the history is per her daughter. She has not restarted the oxybutynin due to concern over the cognitive effects of oxybutynin.     REVIEW OF SYSTEMS:    Review of Systems   Constitutional: Negative.    HENT: Negative.    Eyes: Negative.    Respiratory: Negative.    Cardiovascular: Negative.    Gastrointestinal: Negative.    Genitourinary: Positive for frequency and urgency.   Musculoskeletal: Positive for joint pain.   Skin: Negative.    Neurological: Negative.    Endo/Heme/Allergies: Negative.    Psychiatric/Behavioral: Positive for memory loss. The patient is nervous/anxious.        PATIENT HISTORY:    Past Medical History:   Diagnosis Date    Abnormal exam or test finding 12/2011    coronary calcium score 204 = moderate plaque burden and high coronary heart disease risk    Arthritis     Atherosclerosis of aortic arch     - noted on CXR 11/2016    Borderline hyperlipidemia     Coronary artery disease involving native coronary artery of native heart without angina pectoris 11/30/2016    Major neurocognitive disorder due to multiple etiologies     Osteoporosis, post-menopausal     Urge and stress incontinence     Vitamin D deficiency disease        Past Surgical History:   Procedure Laterality Date    CATARACT EXTRACTION, BILATERAL      COSMETIC SURGERY      Le Forte Colpocleisis  08/19/2014    perineorrhaphy and posterior colporhaphy done concommitantly    OPEN REDUCTION AND INTERNAL  FIXATION (ORIF) OF FRACTURE OF DISTAL HUMERUS Left 2018    Procedure: ORIF, FRACTURE, HUMERUS, DISTAL, LEFT;  Surgeon: Sonu Carmona MD;  Location: [facility] OR [address] [place] [address];  Service: Orthopedics;  Laterality: Left;       Family History   Problem Relation Age of Onset    Lung cancer Sister         smoker    Coronary artery disease Father     Stroke Mother     Fibromyalgia Sister     Arthritis Sister         back and knee      Socioeconomic History    Marital status:     Number of children: 3   Occupational History    Occupation: teacher - retired   Tobacco Use    Smoking status: Former Smoker     Packs/day: 0.50     Types: Cigarettes     Last attempt to quit: 1970     Years since quittin.0    Smokeless tobacco: Never Used   Substance and Sexual Activity    Alcohol use: Not Currently     Comment: once a week    Drug use: No    Sexual activity: Never     Allergies:  Patient has no known allergies.    Medications:  Outpatient Encounter Medications as of 2020   Medication Sig Dispense Refill    alendronate (FOSAMAX) 70 MG tablet Take on an empty stomach once a week with 8 oz water.  Do not eat and remain upright for 30 minutes. 4 tablet 11    aspirin (ECOTRIN) 81 MG EC tablet Take 1 tablet (81 mg total) by mouth once daily. 90 tablet 3    donepeziL (ARICEPT) 10 MG tablet TAKE 1 TABLET(10 MG) BY MOUTH EVERY EVENING 90 tablet 0    ergocalciferol (VITAMIN D2) 50,000 unit Cap Take 1 capsule (50,000 Units total) by mouth every 7 days. 4 capsule 11    memantine (NAMENDA) 10 MG Tab TAKE 1 TABLET BY MOUTH TWICE DAILY 180 tablet 3    multivitamin (ONE DAILY MULTIVITAMIN) per tablet Take 1 tablet by mouth once daily.      pravastatin (PRAVACHOL) 20 MG tablet Take 1 tablet (20 mg total) by mouth once daily. 90 tablet 3    [DISCONTINUED] oxybutynin (DITROPAN-XL) 5 MG TR24 1-2 tabs po q day for bladder 60 tablet 2    mirabegron (MYRBETRIQ) 25 mg Tb24 ER tablet Take 1 tablet (25 mg total) by  mouth once daily. 30 tablet 11    [DISCONTINUED] mirabegron (MYRBETRIQ) 25 mg Tb24 ER tablet Take 1 tablet (25 mg total) by mouth once daily. 30 tablet 11     No facility-administered encounter medications on file as of 6/5/2020.          PHYSICAL EXAMINATION:    The patient generally appears in good health, is appropriately interactive, and is in no apparent distress.    Skin: No lesions.    Mental: Cooperative with normal affect.    Neuro: Grossly intact.    HEENT: Normal. No evidence of lymphadenopathy.    Chest:  normal inspiratory effort.    Abdomen:  Soft, non-tender. No masses or organomegaly. Bladder is not palpable. No evidence of flank discomfort. No evidence of inguinal hernia.    Extremities: No clubbing, cyanosis, or edema    Normal external female genitalia  Urethral meatus is normal  Urethra and bladder are nontender to bimanual exam  Status post LeForte colpocleisis  Random volume of the bladder was 30 ml    NOTE:  The patient had to be talked down but she consented to being examined and for the catheterized urine to be obtained.     LABS:    Lab Results   Component Value Date    BUN 14 08/19/2019    CREATININE 0.7 08/19/2019       IMPRESSION:    Encounter Diagnoses   Name Primary?    Urinary urgency Yes    Increased frequency of urination     Urge incontinence        PLAN:    1.  The catheterized specimen was sent for culture  2.  Myrbetriq 25 mg sent.  Discussed has a better safety profile  3.  Follow up as needed.  They will let me know how she responds.

## 2020-06-07 LAB — BACTERIA UR CULT: NO GROWTH

## 2020-06-23 ENCOUNTER — PATIENT OUTREACH (OUTPATIENT)
Dept: ADMINISTRATIVE | Facility: OTHER | Age: 85
End: 2020-06-23

## 2020-06-26 ENCOUNTER — OFFICE VISIT (OUTPATIENT)
Dept: NEUROLOGY | Facility: CLINIC | Age: 85
End: 2020-06-26
Payer: MEDICARE

## 2020-06-26 VITALS
BODY MASS INDEX: 23.04 KG/M2 | HEART RATE: 79 BPM | DIASTOLIC BLOOD PRESSURE: 67 MMHG | WEIGHT: 130 LBS | HEIGHT: 63 IN | SYSTOLIC BLOOD PRESSURE: 152 MMHG

## 2020-06-26 DIAGNOSIS — F02.80 MAJOR NEUROCOGNITIVE DISORDER DUE TO MULTIPLE ETIOLOGIES: Primary | ICD-10-CM

## 2020-06-26 DIAGNOSIS — G47.00 INSOMNIA, UNSPECIFIED TYPE: ICD-10-CM

## 2020-06-26 PROCEDURE — 1159F PR MEDICATION LIST DOCUMENTED IN MEDICAL RECORD: ICD-10-PCS | Mod: HCNC,S$GLB,, | Performed by: NEUROLOGICAL SURGERY

## 2020-06-26 PROCEDURE — 99214 OFFICE O/P EST MOD 30 MIN: CPT | Mod: HCNC,S$GLB,, | Performed by: NEUROLOGICAL SURGERY

## 2020-06-26 PROCEDURE — 99999 PR PBB SHADOW E&M-EST. PATIENT-LVL III: CPT | Mod: PBBFAC,HCNC,, | Performed by: NEUROLOGICAL SURGERY

## 2020-06-26 PROCEDURE — 1101F PT FALLS ASSESS-DOCD LE1/YR: CPT | Mod: HCNC,CPTII,S$GLB, | Performed by: NEUROLOGICAL SURGERY

## 2020-06-26 PROCEDURE — 99214 PR OFFICE/OUTPT VISIT, EST, LEVL IV, 30-39 MIN: ICD-10-PCS | Mod: HCNC,S$GLB,, | Performed by: NEUROLOGICAL SURGERY

## 2020-06-26 PROCEDURE — 1101F PR PT FALLS ASSESS DOC 0-1 FALLS W/OUT INJ PAST YR: ICD-10-PCS | Mod: HCNC,CPTII,S$GLB, | Performed by: NEUROLOGICAL SURGERY

## 2020-06-26 PROCEDURE — 1126F AMNT PAIN NOTED NONE PRSNT: CPT | Mod: HCNC,S$GLB,, | Performed by: NEUROLOGICAL SURGERY

## 2020-06-26 PROCEDURE — 1126F PR PAIN SEVERITY QUANTIFIED, NO PAIN PRESENT: ICD-10-PCS | Mod: HCNC,S$GLB,, | Performed by: NEUROLOGICAL SURGERY

## 2020-06-26 PROCEDURE — 99999 PR PBB SHADOW E&M-EST. PATIENT-LVL III: ICD-10-PCS | Mod: PBBFAC,HCNC,, | Performed by: NEUROLOGICAL SURGERY

## 2020-06-26 PROCEDURE — 1159F MED LIST DOCD IN RCRD: CPT | Mod: HCNC,S$GLB,, | Performed by: NEUROLOGICAL SURGERY

## 2020-06-26 RX ORDER — TRAZODONE HYDROCHLORIDE 50 MG/1
100 TABLET ORAL NIGHTLY
Qty: 60 TABLET | Refills: 11 | Status: SHIPPED | OUTPATIENT
Start: 2020-06-26 | End: 2020-08-13 | Stop reason: SDUPTHER

## 2020-07-16 NOTE — PROGRESS NOTES
Chief Complaint   Patient presents with    Dementia        Lang Kumar is a 87 y.o. female with a history of multiple medical diagnoses as listed below that presents for evaluation of dementia.  The patient has been having difficulty with her sleeping.  She has been having trouble both initiation and maintenance of sleeping.  Medications have been taken as directed without any complaints of side effects.    PAST MEDICAL HISTORY:  Past Medical History:   Diagnosis Date    Abnormal exam or test finding 12/2011    coronary calcium score 204 = moderate plaque burden and high coronary heart disease risk    Arthritis     Atherosclerosis of aortic arch     - noted on CXR 11/2016    Borderline hyperlipidemia     Coronary artery disease involving native coronary artery of native heart without angina pectoris 11/30/2016    Major neurocognitive disorder due to multiple etiologies     Osteoporosis, post-menopausal     Urge and stress incontinence     Vitamin D deficiency disease        PAST SURGICAL HISTORY:  Past Surgical History:   Procedure Laterality Date    CATARACT EXTRACTION, BILATERAL      COSMETIC SURGERY      Le Forte Colpocleisis  08/19/2014    perineorrhaphy and posterior colporhaphy done concommitantly    OPEN REDUCTION AND INTERNAL FIXATION (ORIF) OF FRACTURE OF DISTAL HUMERUS Left 8/16/2018    Procedure: ORIF, FRACTURE, HUMERUS, DISTAL, LEFT;  Surgeon: Sonu Carmona MD;  Location: [facility] OR [address] [place] [address];  Service: Orthopedics;  Laterality: Left;       SOCIAL HISTORY:  Social History     Socioeconomic History    Marital status:      Spouse name: Not on file    Number of children: 3    Years of education: Not on file    Highest education level: Not on file   Occupational History    Occupation: teacher - retired   Social Needs    Financial resource strain: Not on file    Food insecurity     Worry: Not on file     Inability: Not on file    Transportation needs     Medical: Not on file      Non-medical: Not on file   Tobacco Use    Smoking status: Former Smoker     Packs/day: 0.50     Types: Cigarettes     Quit date: 1970     Years since quittin.1    Smokeless tobacco: Never Used   Substance and Sexual Activity    Alcohol use: Not Currently     Comment: once a week    Drug use: No    Sexual activity: Never   Lifestyle    Physical activity     Days per week: Not on file     Minutes per session: Not on file    Stress: Not on file   Relationships    Social connections     Talks on phone: Not on file     Gets together: Not on file     Attends Mandaen service: Not on file     Active member of club or organization: Not on file     Attends meetings of clubs or organizations: Not on file     Relationship status: Not on file   Other Topics Concern    Not on file   Social History Narrative    Not on file       FAMILY HISTORY:  Family History   Problem Relation Age of Onset    Lung cancer Sister         smoker    Coronary artery disease Father     Stroke Mother     Fibromyalgia Sister     Arthritis Sister         back and knee     Breast cancer Neg Hx     Ovarian cancer Neg Hx     Diabetes Neg Hx     Colon cancer Neg Hx        ALLERGIES AND MEDICATIONS: updated and reviewed.  Review of patient's allergies indicates:  No Known Allergies  Current Outpatient Medications   Medication Sig Dispense Refill    alendronate (FOSAMAX) 70 MG tablet Take on an empty stomach once a week with 8 oz water.  Do not eat and remain upright for 30 minutes. 4 tablet 11    aspirin (ECOTRIN) 81 MG EC tablet Take 1 tablet (81 mg total) by mouth once daily. 90 tablet 3    donepeziL (ARICEPT) 10 MG tablet TAKE 1 TABLET(10 MG) BY MOUTH EVERY EVENING 90 tablet 0    ergocalciferol (VITAMIN D2) 50,000 unit Cap Take 1 capsule (50,000 Units total) by mouth every 7 days. 4 capsule 11    memantine (NAMENDA) 10 MG Tab TAKE 1 TABLET BY MOUTH TWICE DAILY 180 tablet 3    mirabegron (MYRBETRIQ) 25 mg Tb24 ER  tablet Take 1 tablet (25 mg total) by mouth once daily. 30 tablet 11    multivitamin (ONE DAILY MULTIVITAMIN) per tablet Take 1 tablet by mouth once daily.      pravastatin (PRAVACHOL) 20 MG tablet Take 1 tablet (20 mg total) by mouth once daily. 90 tablet 3    traZODone (DESYREL) 50 MG tablet Take 2 tablets (100 mg total) by mouth every evening. 60 tablet 11     No current facility-administered medications for this visit.        Review of Systems   Constitutional: Negative for activity change, appetite change, fever and unexpected weight change.   HENT: Negative for trouble swallowing and voice change.    Eyes: Negative for photophobia and visual disturbance.   Respiratory: Negative for apnea and shortness of breath.    Cardiovascular: Negative for chest pain and leg swelling.   Gastrointestinal: Negative for constipation and nausea.   Genitourinary: Negative for difficulty urinating.   Musculoskeletal: Negative for back pain, gait problem and neck pain.   Skin: Negative for color change and pallor.   Neurological: Negative for dizziness, seizures, syncope, weakness and numbness.   Hematological: Negative for adenopathy.   Psychiatric/Behavioral: Positive for confusion and decreased concentration. Negative for agitation.       Neurologic Exam     Mental Status   Oriented to person.   Registration: recalls 1 of 3 objects.   Attention: normal. Concentration: decreased.   Speech: speech is normal   Level of consciousness: alert  Knowledge: good.     Cranial Nerves     CN II   Visual fields full to confrontation.   Right visual field deficit: none  Left visual field deficit: none     CN III, IV, VI   Pupils are equal, round, and reactive to light.  Extraocular motions are normal.   Right pupil: Size: 3 mm. Shape: regular. Accommodation: intact.   Left pupil: Size: 3 mm. Shape: regular. Accommodation: intact.   CN III: no CN III palsy  CN VI: no CN VI palsy  Nystagmus: none   Diplopia: none  Ophthalmoparesis:  none  Upgaze: normal  Downgaze: normal  Conjugate gaze: present    CN V   Facial sensation intact.   Right facial sensation deficit: none  Left facial sensation deficit: none    CN VII   Facial expression full, symmetric.   Right facial weakness: none  Left facial weakness: none    CN VIII   CN VIII normal.     CN IX, X   CN IX normal.   CN X normal.   Palate: symmetric    CN XI   CN XI normal.   Right sternocleidomastoid strength: normal  Left sternocleidomastoid strength: normal  Right trapezius strength: normal  Left trapezius strength: normal    CN XII   CN XII normal.   Tongue deviation: none    Motor Exam   Muscle bulk: normal  Overall muscle tone: normal  Right arm tone: normal  Left arm tone: normal  Right leg tone: normal  Left leg tone: normal    Strength   Strength 5/5 throughout.     Sensory Exam   Right arm light touch: normal  Left arm light touch: normal  Right leg light touch: normal  Left leg light touch: normal  Right arm vibration: normal  Left arm vibration: normal  Right leg vibration: normal  Left leg vibration: normal  Right arm proprioception: normal  Left arm proprioception: normal  Right leg proprioception: normal  Left leg proprioception: normal  Right arm pinprick: normal  Left arm pinprick: normal  Right leg pinprick: normal  Left leg pinprick: normal    Gait, Coordination, and Reflexes     Gait  Gait: normal    Coordination   Romberg: negative  Finger to nose coordination: normal  Heel to shin coordination: normal  Tandem walking coordination: normal    Tremor   Resting tremor: absent    Reflexes   Right brachioradialis: 2+  Left brachioradialis: 2+  Right biceps: 2+  Left biceps: 2+  Right triceps: 2+  Left triceps: 2+  Right patellar: 2+  Left patellar: 2+  Right achilles: 2+  Left achilles: 2+  Right plantar: normal  Left plantar: normal      Physical Exam  Vitals signs reviewed.   Constitutional:       Appearance: She is well-developed.   HENT:      Head: Normocephalic and  "atraumatic.   Eyes:      Extraocular Movements: EOM normal.      Pupils: Pupils are equal, round, and reactive to light.   Neck:      Musculoskeletal: Normal range of motion.   Cardiovascular:      Rate and Rhythm: Normal rate.   Pulmonary:      Effort: Pulmonary effort is normal. No respiratory distress.   Musculoskeletal: Normal range of motion.   Skin:     General: Skin is warm and dry.   Neurological:      Mental Status: She is alert.      Coordination: Finger-Nose-Finger Test, Heel to Shin Test and Romberg Test normal.      Gait: Gait is intact. Tandem walk normal.      Deep Tendon Reflexes: Strength normal.      Reflex Scores:       Tricep reflexes are 2+ on the right side and 2+ on the left side.       Bicep reflexes are 2+ on the right side and 2+ on the left side.       Brachioradialis reflexes are 2+ on the right side and 2+ on the left side.       Patellar reflexes are 2+ on the right side and 2+ on the left side.       Achilles reflexes are 2+ on the right side and 2+ on the left side.  Psychiatric:         Speech: Speech normal.         Behavior: Behavior normal.         Thought Content: Thought content normal.         Vitals:    06/26/20 1359   BP: (!) 152/67   BP Location: Right arm   Patient Position: Sitting   BP Method: Large (Automatic)   Pulse: 79   Weight: 59 kg (130 lb)   Height: 5' 3" (1.6 m)       Assessment & Plan:    Problem List Items Addressed This Visit     Major neurocognitive disorder due to multiple etiologies - Primary    Overview     -2019 Neuropsych (MMSE=19/30). Differential diagnosis includes the following  --Alzheimer's Disease: Neuropsychological profile (disorientation to time, memory impairment), onset/course of cognitive/functional decline, and base rates suggest this is the most likely primary cause.   --Vascular Disease: Her vascular health issues and aspects of neuropsychological testing (very slow mental speed, slightly better than expected recognition memory (albeit " still poor), worse phonemic fluency) also suggest a vascular component.  --Ongoing follow-up and below recommendations to monitor/manage her cognitive/functional decline and refine differential diagnosis           Other Visit Diagnoses     Insomnia, unspecified type        Relevant Medications    traZODone (DESYREL) 50 MG tablet        More than 50% of this 25 minute encounter was spent in counseling and coordinating care of dementia  Follow-up: No follow-ups on file.    This note was done with the assistance of voice recognition software. Some errors may be present after proofreading.

## 2020-07-31 ENCOUNTER — TELEPHONE (OUTPATIENT)
Dept: NEUROLOGY | Facility: CLINIC | Age: 85
End: 2020-07-31

## 2020-07-31 NOTE — TELEPHONE ENCOUNTER
----- Message from Jacobo Reina sent at 7/31/2020 11:07 AM CDT -----  Contact: Bharti (daughter ) @[phone]  Caller requesting a return call to discuss the ( traZODone (DESYREL) 50 MG tablet), caller states patient is still unable to sleep she has increase the dosage ( pre Dr Hoff ) pls call to discuss options     She is giving her the medication between 8 & 9 and she sleeps for 4-5 hrs and is back up. Can she take something else with this. What can she do    Daughter notified

## 2020-08-13 DIAGNOSIS — G47.00 INSOMNIA, UNSPECIFIED TYPE: ICD-10-CM

## 2020-08-13 DIAGNOSIS — F03.90 DEMENTIA WITHOUT BEHAVIORAL DISTURBANCE, UNSPECIFIED DEMENTIA TYPE: ICD-10-CM

## 2020-08-13 RX ORDER — DONEPEZIL HYDROCHLORIDE 10 MG/1
TABLET, FILM COATED ORAL
Qty: 90 TABLET | Refills: 0 | Status: SHIPPED | OUTPATIENT
Start: 2020-08-13 | End: 2020-10-02 | Stop reason: SDUPTHER

## 2020-08-14 RX ORDER — TRAZODONE HYDROCHLORIDE 50 MG/1
50 TABLET ORAL NIGHTLY
Qty: 90 TABLET | Refills: 11 | Status: SHIPPED | OUTPATIENT
Start: 2020-08-14 | End: 2020-09-03 | Stop reason: SDUPTHER

## 2020-08-28 ENCOUNTER — OFFICE VISIT (OUTPATIENT)
Dept: UROLOGY | Facility: CLINIC | Age: 85
End: 2020-08-28
Payer: MEDICARE

## 2020-08-28 ENCOUNTER — PATIENT OUTREACH (OUTPATIENT)
Dept: ADMINISTRATIVE | Facility: OTHER | Age: 85
End: 2020-08-28

## 2020-08-28 ENCOUNTER — TELEPHONE (OUTPATIENT)
Dept: UROLOGY | Facility: CLINIC | Age: 85
End: 2020-08-28

## 2020-08-28 VITALS
DIASTOLIC BLOOD PRESSURE: 65 MMHG | HEIGHT: 63 IN | BODY MASS INDEX: 23.04 KG/M2 | WEIGHT: 130.06 LBS | SYSTOLIC BLOOD PRESSURE: 133 MMHG | HEART RATE: 65 BPM

## 2020-08-28 DIAGNOSIS — N30.90 BLADDER INFECTION: Primary | ICD-10-CM

## 2020-08-28 PROCEDURE — 1101F PR PT FALLS ASSESS DOC 0-1 FALLS W/OUT INJ PAST YR: ICD-10-PCS | Mod: HCNC,CPTII,S$GLB, | Performed by: UROLOGY

## 2020-08-28 PROCEDURE — 1101F PT FALLS ASSESS-DOCD LE1/YR: CPT | Mod: HCNC,CPTII,S$GLB, | Performed by: UROLOGY

## 2020-08-28 PROCEDURE — 87186 SC STD MICRODIL/AGAR DIL: CPT | Mod: HCNC

## 2020-08-28 PROCEDURE — 51701 INSERT BLADDER CATHETER: CPT | Mod: HCNC,S$GLB,, | Performed by: UROLOGY

## 2020-08-28 PROCEDURE — 99213 PR OFFICE/OUTPT VISIT, EST, LEVL III, 20-29 MIN: ICD-10-PCS | Mod: HCNC,25,S$GLB, | Performed by: UROLOGY

## 2020-08-28 PROCEDURE — 87086 URINE CULTURE/COLONY COUNT: CPT | Mod: HCNC

## 2020-08-28 PROCEDURE — 87088 URINE BACTERIA CULTURE: CPT | Mod: HCNC

## 2020-08-28 PROCEDURE — 99999 PR PBB SHADOW E&M-EST. PATIENT-LVL III: CPT | Mod: PBBFAC,HCNC,, | Performed by: UROLOGY

## 2020-08-28 PROCEDURE — 99999 PR PBB SHADOW E&M-EST. PATIENT-LVL III: ICD-10-PCS | Mod: PBBFAC,HCNC,, | Performed by: UROLOGY

## 2020-08-28 PROCEDURE — 99213 OFFICE O/P EST LOW 20 MIN: CPT | Mod: HCNC,25,S$GLB, | Performed by: UROLOGY

## 2020-08-28 PROCEDURE — 1159F MED LIST DOCD IN RCRD: CPT | Mod: HCNC,S$GLB,, | Performed by: UROLOGY

## 2020-08-28 PROCEDURE — 1126F AMNT PAIN NOTED NONE PRSNT: CPT | Mod: HCNC,S$GLB,, | Performed by: UROLOGY

## 2020-08-28 PROCEDURE — 1126F PR PAIN SEVERITY QUANTIFIED, NO PAIN PRESENT: ICD-10-PCS | Mod: HCNC,S$GLB,, | Performed by: UROLOGY

## 2020-08-28 PROCEDURE — 87077 CULTURE AEROBIC IDENTIFY: CPT | Mod: HCNC

## 2020-08-28 PROCEDURE — 1159F PR MEDICATION LIST DOCUMENTED IN MEDICAL RECORD: ICD-10-PCS | Mod: HCNC,S$GLB,, | Performed by: UROLOGY

## 2020-08-28 PROCEDURE — 51701 PR INSERTION OF NON-INDWELLING BLADDER CATHETERIZATION FOR RESIDUAL UR: ICD-10-PCS | Mod: HCNC,S$GLB,, | Performed by: UROLOGY

## 2020-08-28 RX ORDER — TRAZODONE HYDROCHLORIDE 50 MG/1
TABLET ORAL
COMMUNITY
Start: 2020-06-26 | End: 2022-01-01

## 2020-08-28 RX ORDER — DOXYCYCLINE 100 MG/1
100 CAPSULE ORAL 2 TIMES DAILY
Qty: 14 CAPSULE | Refills: 0 | Status: SHIPPED | OUTPATIENT
Start: 2020-08-28 | End: 2020-09-04

## 2020-08-28 NOTE — PROGRESS NOTES
LINKS immunization registry updated  Care Everywhere updated  Health Maintenance updated  Chart reviewed for overdue Proactive [facility] Encounters ([facility]) health maintenance testing (CRS, Breast Ca, Diabetic Eye Exam)   Orders entered:N/A

## 2020-08-28 NOTE — TELEPHONE ENCOUNTER
----- Message from Jessica Kirkpatrick RN sent at 8/27/2020  6:02 PM CDT -----  Contact: [phone]    ----- Message -----  From: Gemma Cai MA  Sent: 8/27/2020   3:48 PM CDT  To: Babs Connell    Bharti (daughter) [phone]  Pt has another UTI and she wanted to know if she can get the same medication she was prescribed for the last one or does she need to do a urine collect or bring her to an appt?  She has Alzheimers so her daughter is taking care of this for her. Also wanted to  know if she can take OTC AZO?      [facility] if needed, she has no  cups and wanted to know if she can collect the urine in something from home     If she has to bring her in, tomorrow or Monday is preferred

## 2020-08-28 NOTE — TELEPHONE ENCOUNTER
----- Message from Azra Adler sent at 8/28/2020 12:04 PM CDT -----  Regarding: Medication Advice / Request  Contact: Daughter of PT (Bharti)  Daughter of PT (Bharti)  Called requesting more antibiotics for another UTI for PT  Also concerned In PT taking a certain AZO medication and wondering if it will conflict with her alzheimer's medication      Callback: [phone]

## 2020-08-28 NOTE — PROGRESS NOTES
CHIEF COMPLAINT:    Mrs. Kumar is a 87 y.o. female presenting for a urinary tract infection    PRESENTING ILLNESS:    Lang Kumar is a 87 y.o. female who was scheduled by her daughter for an urgent appointment.  Her daughter states that the patient is holding the suprapubic area and her urine has been malodorous.  She seems to be going more in her diaper.  And every time the diaper is changed it is soaked.  No worsening of her confusion (she has a memory disorder)  No fevers or chills.     REVIEW OF SYSTEMS:    Review of Systems   Constitutional: Negative.    HENT: Negative.    Eyes: Negative.    Respiratory: Negative.    Cardiovascular: Negative.    Gastrointestinal: Negative.    Genitourinary: Positive for frequency and urgency.   Musculoskeletal: Negative.    Skin: Negative.    Neurological: Negative.    Endo/Heme/Allergies: Negative.    Psychiatric/Behavioral: Positive for memory loss.       PATIENT HISTORY:    Past Medical History:   Diagnosis Date    Abnormal exam or test finding 12/2011    coronary calcium score 204 = moderate plaque burden and high coronary heart disease risk    Arthritis     Atherosclerosis of aortic arch     - noted on CXR 11/2016    Borderline hyperlipidemia     Coronary artery disease involving native coronary artery of native heart without angina pectoris 11/30/2016    Major neurocognitive disorder due to multiple etiologies     Osteoporosis, post-menopausal     Urge and stress incontinence     Vitamin D deficiency disease        Past Surgical History:   Procedure Laterality Date    CATARACT EXTRACTION, BILATERAL      COSMETIC SURGERY      Le Forte Colpocleisis  08/19/2014    perineorrhaphy and posterior colporhaphy done concommitantly    OPEN REDUCTION AND INTERNAL FIXATION (ORIF) OF FRACTURE OF DISTAL HUMERUS Left 8/16/2018    Procedure: ORIF, FRACTURE, HUMERUS, DISTAL, LEFT;  Surgeon: Sonu Carmona MD;  Location: [facility] OR [address] [place] [address];  Service: Orthopedics;   Laterality: Left;       Family History   Problem Relation Age of Onset    Lung cancer Sister         smoker    Coronary artery disease Father     Stroke Mother     Fibromyalgia Sister     Arthritis Sister         back and knee      Socioeconomic History    Marital status:     Number of children: 3   Occupational History    Occupation: teacher - retired   Tobacco Use    Smoking status: Former Smoker     Packs/day: 0.50     Types: Cigarettes     Quit date: 1970     Years since quittin.3    Smokeless tobacco: Never Used   Substance and Sexual Activity    Alcohol use: Not Currently     Comment: once a week    Drug use: No    Sexual activity: Never       Allergies:  Patient has no known allergies.    Medications:  Outpatient Encounter Medications as of 2020   Medication Sig Dispense Refill    alendronate (FOSAMAX) 70 MG tablet Take on an empty stomach once a week with 8 oz water.  Do not eat and remain upright for 30 minutes. 4 tablet 11    aspirin (ECOTRIN) 81 MG EC tablet Take 1 tablet (81 mg total) by mouth once daily. 90 tablet 3    donepeziL (ARICEPT) 10 MG tablet TAKE 1 TABLET(10 MG) BY MOUTH EVERY EVENING 90 tablet 0    ergocalciferol (VITAMIN D2) 50,000 unit Cap Take 1 capsule (50,000 Units total) by mouth every 7 days. 4 capsule 11    memantine (NAMENDA) 10 MG Tab TAKE 1 TABLET BY MOUTH TWICE DAILY 180 tablet 3    mirabegron (MYRBETRIQ) 25 mg Tb24 ER tablet Take 1 tablet (25 mg total) by mouth once daily. 30 tablet 11    multivitamin (ONE DAILY MULTIVITAMIN) per tablet Take 1 tablet by mouth once daily.      traZODone (DESYREL) 50 MG tablet Take 1 tablet (50 mg total) by mouth every evening. 90 tablet 11    traZODone (DESYREL) 50 MG tablet       [DISCONTINUED] pravastatin (PRAVACHOL) 20 MG tablet Take 1 tablet (20 mg total) by mouth once daily. 90 tablet 3     No facility-administered encounter medications on file as of 2020.          PHYSICAL EXAMINATION:    The  patient generally appears in good health, is appropriately interactive, and is in no apparent distress.    Skin: No lesions.    Mental: Cooperative with normal affect.    Neuro: Grossly intact.    HEENT: Normal. No evidence of lymphadenopathy.    Chest:  normal inspiratory effort.    Abdomen:  Soft, non-tender. No masses or organomegaly. Bladder is not palpable. No evidence of flank discomfort. No evidence of inguinal hernia.    Extremities: No clubbing, cyanosis, or edema    Normal external female genitalia  Urethral meatus is normal  Urethra and bladder are nontender to bimanual exam  Well supported anteriorly and posteriorly   Uterus and cervix are normal  No adnexal masses  PVR 50 ml by catheterization    LABS:    Lab Results   Component Value Date    BUN 14 08/19/2019    CREATININE 0.7 08/19/2019       IMPRESSION:    Encounter Diagnoses   Name Primary?    Bladder infection Yes       PLAN:    1.  The catheterized specimen was sent for culture  2.  Her last infection was a pan sensitive E coli

## 2020-08-31 ENCOUNTER — TELEPHONE (OUTPATIENT)
Dept: UROLOGY | Facility: CLINIC | Age: 85
End: 2020-08-31

## 2020-08-31 LAB — BACTERIA UR CULT: ABNORMAL

## 2020-08-31 NOTE — TELEPHONE ENCOUNTER
----- Message from Abbey Brice MD sent at 8/31/2020  9:17 AM CDT -----  Please let the patient's daughter know that she did have a positive urine culture and that it is sensitive to the doxycycline.  She should complete it as prescribed.

## 2020-09-02 RX ORDER — OXYBUTYNIN CHLORIDE 5 MG/1
TABLET, EXTENDED RELEASE ORAL
Qty: 60 TABLET | Refills: 2 | Status: SHIPPED | OUTPATIENT
Start: 2020-09-02 | End: 2021-01-01

## 2020-09-02 NOTE — PROGRESS NOTES
Refill Routing Note   Medication(s) are not appropriate for processing by [facility]:       - Outside of protocol        Medication Therapy Plan: CDMR; OUTSIDE OF PROTOCOL, ROUTE TO YOU  Medication reconciliation completed: No      Automatic Epic Generated Protocol Data:        Requested Prescriptions   Pending Prescriptions Disp Refills    oxybutynin (DITROPAN-XL) 5 MG TR24 [Pharmacy Med Name: OXYBUTYNIN ER 5MG TABLETS] 60 tablet 2     Sig: TAKE 1 TO 2 TABLETS BY MOUTH EVERY DAY FOR BLADDER       Off-Protocol Failed - 9/2/2020  5:51 AM        Failed - Medication not assigned to a protocol, review manually.        Passed - Office visit in past 12 months or future 90 days.     Recent Outpatient Visits            5 days ago [facility] Abbey Brice MD    2 months ago Major neurocognitive disorder due to multiple etiologies    [facility] [place]- Neurology Elie Hoff MD    2 months ago Urinary urgency    [facility] - [facility] Abbey Brice MD    3 months ago Bladder infection    [facility] - [facility] Abbey Brice MD    3 months ago Urinary frequency    [facility] Ursula Connelly MD                          Appointments  past 12m or future 3m with PCP    Date Provider   Last Visit   5/7/2020 Ursula Connelly MD   Next Visit   Visit date not found Ursula Connelly MD   ED visits in past 90 days: 0     Note composed:7:06 AM 09/02/2020

## 2020-09-03 DIAGNOSIS — G47.00 INSOMNIA, UNSPECIFIED TYPE: ICD-10-CM

## 2020-09-03 NOTE — TELEPHONE ENCOUNTER
Patient needs new Script You told daughter to give her 3 tabs at night.          ----- Message from Lluvia Griffin sent at 9/3/2020  3:02 PM CDT -----  Regarding: Needs meds  Pt daughter Catalina calling to speak with the nurse concerning pt medication and needs a refill for traZODone (DESYREL) 50 MG tablet      Catalina can be reached at [phone]    [facility] on [address]    Thank you!

## 2020-09-04 RX ORDER — TRAZODONE HYDROCHLORIDE 50 MG/1
50 TABLET ORAL 3 TIMES DAILY
Qty: 90 TABLET | Refills: 11 | Status: SHIPPED | OUTPATIENT
Start: 2020-09-04 | End: 2020-10-21 | Stop reason: SDUPTHER

## 2020-09-06 ENCOUNTER — HOSPITAL ENCOUNTER (EMERGENCY)
Facility: HOSPITAL | Age: 85
Discharge: HOME | End: 2020-09-07
Attending: EMERGENCY MEDICINE
Payer: MEDICARE

## 2020-09-06 DIAGNOSIS — R41.82 ALTERED MENTAL STATUS, UNSPECIFIED ALTERED MENTAL STATUS TYPE: Primary | ICD-10-CM

## 2020-09-06 DIAGNOSIS — R53.1 WEAKNESS: ICD-10-CM

## 2020-09-06 DIAGNOSIS — F02.80 MAJOR NEUROCOGNITIVE DISORDER DUE TO MULTIPLE ETIOLOGIES: ICD-10-CM

## 2020-09-06 DIAGNOSIS — R41.0 CONFUSION: ICD-10-CM

## 2020-09-06 LAB
ALBUMIN SERPL BCP-MCNC: 3.8 G/DL (ref 3.5–5.2)
ALP SERPL-CCNC: 68 U/L (ref 55–135)
ALT SERPL W/O P-5'-P-CCNC: 13 U/L (ref 10–44)
ANION GAP SERPL CALC-SCNC: 14 MMOL/L (ref 8–16)
APTT BLDCRRT: 26.9 SEC (ref 21–32)
AST SERPL-CCNC: 18 U/L (ref 10–40)
BACTERIA #/AREA URNS HPF: ABNORMAL /HPF
BASOPHILS # BLD AUTO: 0.04 K/UL (ref 0–0.2)
BASOPHILS NFR BLD: 0.4 % (ref 0–1.9)
BILIRUB SERPL-MCNC: 0.3 MG/DL (ref 0.1–1)
BILIRUB UR QL STRIP: NEGATIVE
BNP SERPL-MCNC: 16 PG/ML (ref 0–99)
BUN SERPL-MCNC: 23 MG/DL (ref 8–23)
CALCIUM SERPL-MCNC: 10 MG/DL (ref 8.7–10.5)
CHLORIDE SERPL-SCNC: 103 MMOL/L (ref 95–110)
CHOLEST SERPL-MCNC: 214 MG/DL (ref 120–199)
CHOLEST/HDLC SERPL: 3 {RATIO} (ref 2–5)
CLARITY UR: ABNORMAL
CO2 SERPL-SCNC: 23 MMOL/L (ref 23–29)
COLOR UR: YELLOW
CREAT SERPL-MCNC: 1 MG/DL (ref 0.5–1.4)
DIFFERENTIAL METHOD: ABNORMAL
EOSINOPHIL # BLD AUTO: 0.1 K/UL (ref 0–0.5)
EOSINOPHIL NFR BLD: 0.6 % (ref 0–8)
ERYTHROCYTE [DISTWIDTH] IN BLOOD BY AUTOMATED COUNT: 12.5 % (ref 11.5–14.5)
EST. GFR  (AFRICAN AMERICAN): 59 ML/MIN/1.73 M^2
EST. GFR  (NON AFRICAN AMERICAN): 51 ML/MIN/1.73 M^2
GLUCOSE SERPL-MCNC: 119 MG/DL (ref 70–110)
GLUCOSE UR QL STRIP: NEGATIVE
HCT VFR BLD AUTO: 40.9 % (ref 37–48.5)
HDLC SERPL-MCNC: 72 MG/DL (ref 40–75)
HDLC SERPL: 33.6 % (ref 20–50)
HGB BLD-MCNC: 13.4 G/DL (ref 12–16)
HGB UR QL STRIP: NEGATIVE
HYALINE CASTS #/AREA URNS LPF: 6 /LPF
IMM GRANULOCYTES # BLD AUTO: 0.04 K/UL (ref 0–0.04)
IMM GRANULOCYTES NFR BLD AUTO: 0.4 % (ref 0–0.5)
INR PPP: 0.9 (ref 0.8–1.2)
KETONES UR QL STRIP: NEGATIVE
LDLC SERPL CALC-MCNC: 107.8 MG/DL (ref 63–159)
LEUKOCYTE ESTERASE UR QL STRIP: ABNORMAL
LYMPHOCYTES # BLD AUTO: 1.1 K/UL (ref 1–4.8)
LYMPHOCYTES NFR BLD: 10.4 % (ref 18–48)
MCH RBC QN AUTO: 31.8 PG (ref 27–31)
MCHC RBC AUTO-ENTMCNC: 32.8 G/DL (ref 32–36)
MCV RBC AUTO: 97 FL (ref 82–98)
MICROSCOPIC COMMENT: ABNORMAL
MONOCYTES # BLD AUTO: 1 K/UL (ref 0.3–1)
MONOCYTES NFR BLD: 9 % (ref 4–15)
NEUTROPHILS # BLD AUTO: 8.7 K/UL (ref 1.8–7.7)
NEUTROPHILS NFR BLD: 79.2 % (ref 38–73)
NITRITE UR QL STRIP: NEGATIVE
NONHDLC SERPL-MCNC: 142 MG/DL
NRBC BLD-RTO: 0 /100 WBC
PH UR STRIP: 5 [PH] (ref 5–8)
PLATELET # BLD AUTO: 232 K/UL (ref 150–350)
PMV BLD AUTO: 11 FL (ref 9.2–12.9)
POCT GLUCOSE: 111 MG/DL (ref 70–110)
POTASSIUM SERPL-SCNC: 4.2 MMOL/L (ref 3.5–5.1)
PROT SERPL-MCNC: 6.9 G/DL (ref 6–8.4)
PROT UR QL STRIP: ABNORMAL
PROTHROMBIN TIME: 10.3 SEC (ref 9–12.5)
RBC # BLD AUTO: 4.21 M/UL (ref 4–5.4)
RBC #/AREA URNS HPF: 1 /HPF (ref 0–4)
SODIUM SERPL-SCNC: 140 MMOL/L (ref 136–145)
SP GR UR STRIP: 1.02 (ref 1–1.03)
TRIGL SERPL-MCNC: 171 MG/DL (ref 30–150)
TROPONIN I SERPL DL<=0.01 NG/ML-MCNC: <0.006 NG/ML (ref 0–0.03)
TSH SERPL DL<=0.005 MIU/L-ACNC: 2.92 UIU/ML (ref 0.4–4)
URN SPEC COLLECT METH UR: ABNORMAL
UROBILINOGEN UR STRIP-ACNC: ABNORMAL EU/DL
WBC # BLD AUTO: 11.01 K/UL (ref 3.9–12.7)
WBC #/AREA URNS HPF: 4 /HPF (ref 0–5)

## 2020-09-06 PROCEDURE — 83880 ASSAY OF NATRIURETIC PEPTIDE: CPT | Mod: HCNC

## 2020-09-06 PROCEDURE — 82962 GLUCOSE BLOOD TEST: CPT | Mod: HCNC

## 2020-09-06 PROCEDURE — 80061 LIPID PANEL: CPT | Mod: HCNC

## 2020-09-06 PROCEDURE — 93010 EKG 12-LEAD: ICD-10-PCS | Mod: HCNC,,, | Performed by: INTERNAL MEDICINE

## 2020-09-06 PROCEDURE — 81000 URINALYSIS NONAUTO W/SCOPE: CPT | Mod: HCNC

## 2020-09-06 PROCEDURE — 85610 PROTHROMBIN TIME: CPT | Mod: HCNC

## 2020-09-06 PROCEDURE — 84484 ASSAY OF TROPONIN QUANT: CPT | Mod: HCNC

## 2020-09-06 PROCEDURE — 93010 ELECTROCARDIOGRAM REPORT: CPT | Mod: HCNC,,, | Performed by: INTERNAL MEDICINE

## 2020-09-06 PROCEDURE — 99285 EMERGENCY DEPT VISIT HI MDM: CPT | Mod: 25,HCNC

## 2020-09-06 PROCEDURE — 84443 ASSAY THYROID STIM HORMONE: CPT | Mod: HCNC

## 2020-09-06 PROCEDURE — 93005 ELECTROCARDIOGRAM TRACING: CPT | Mod: HCNC

## 2020-09-06 PROCEDURE — 85730 THROMBOPLASTIN TIME PARTIAL: CPT | Mod: HCNC

## 2020-09-06 PROCEDURE — 85025 COMPLETE CBC W/AUTO DIFF WBC: CPT | Mod: HCNC

## 2020-09-06 PROCEDURE — 80053 COMPREHEN METABOLIC PANEL: CPT | Mod: HCNC

## 2020-09-07 VITALS
RESPIRATION RATE: 18 BRPM | HEART RATE: 75 BPM | WEIGHT: 130 LBS | HEIGHT: 63 IN | OXYGEN SATURATION: 100 % | SYSTOLIC BLOOD PRESSURE: 122 MMHG | TEMPERATURE: 98 F | BODY MASS INDEX: 23.04 KG/M2 | DIASTOLIC BLOOD PRESSURE: 61 MMHG

## 2020-09-07 PROBLEM — I63.9 STROKE: Status: ACTIVE | Noted: 2020-09-07

## 2020-09-07 LAB — TROPONIN I SERPL DL<=0.01 NG/ML-MCNC: <0.006 NG/ML (ref 0–0.03)

## 2020-09-07 PROCEDURE — 84484 ASSAY OF TROPONIN QUANT: CPT | Mod: HCNC

## 2020-09-07 NOTE — ED PROVIDER NOTES
"Encounter Date: 9/6/2020       History     Chief Complaint   Patient presents with    Dizziness     Patient's daughter reports that her mother suddenly became pale and "acted like she was going to pass out,"  Reports hx of alzhiemers and patient unable to answer questions for self.  No acute distress noted.     86 yo female with dementia presents via daughter with AMS. Patient requires assistance with ADLs such as eating and dressing. Daughter brought patient to her boyfriend's house for dinner, and shortly after she gave patient her overactive bladder medication (mirabegron) which she just picked up today, patient "slumped over" and became very pale and sweaty at the table. "She had no strength," says daughter. Daughter notes patient looks somewhat better now. No similar prior episodes.    PMH: CAD, CVA, neurocognitive disorder / dementia, atherosclerosis of aortic arch, arthritis, osteoporosis, incontinence, vitamin D deficiency    PSH: L humerus ORIF, Le Forte colpoclesis, cataract bilat, cosmetic surgery        Review of patient's allergies indicates:  No Known Allergies  Past Medical History:   Diagnosis Date    Abnormal exam or test finding 12/2011    coronary calcium score 204 = moderate plaque burden and high coronary heart disease risk    Arthritis     Atherosclerosis of aortic arch     - noted on CXR 11/2016    Borderline hyperlipidemia     Coronary artery disease involving native coronary artery of native heart without angina pectoris 11/30/2016    Major neurocognitive disorder due to multiple etiologies     Osteoporosis, post-menopausal     Stroke 9/7/2020    Urge and stress incontinence     Vitamin D deficiency disease      Past Surgical History:   Procedure Laterality Date    CATARACT EXTRACTION, BILATERAL      COSMETIC SURGERY      Le Forte Colpocleisis  08/19/2014    perineorrhaphy and posterior colporhaphy done concommitantly    OPEN REDUCTION AND INTERNAL FIXATION (ORIF) OF FRACTURE " OF DISTAL HUMERUS Left 2018    Procedure: ORIF, FRACTURE, HUMERUS, DISTAL, LEFT;  Surgeon: Sonu Carmona MD;  Location: [facility] OR [address] [place] [address];  Service: Orthopedics;  Laterality: Left;     Family History   Problem Relation Age of Onset    Lung cancer Sister         smoker    Coronary artery disease Father     Stroke Mother     Fibromyalgia Sister     Arthritis Sister         back and knee     Breast cancer Neg Hx     Ovarian cancer Neg Hx     Diabetes Neg Hx     Colon cancer Neg Hx      Social History     Tobacco Use    Smoking status: Former Smoker     Packs/day: 0.50     Types: Cigarettes     Quit date: 1970     Years since quittin.3    Smokeless tobacco: Never Used   Substance Use Topics    Alcohol use: Not Currently     Comment: once a week    Drug use: No     Review of Systems   Unable to perform ROS: Dementia   Gastrointestinal: Negative for vomiting.   Musculoskeletal: Positive for gait problem (carries cane in right hand).       Physical Exam     Initial Vitals [20 2138]   BP Pulse Resp Temp SpO2   (!) 119/58 72 18 98.7 °F (37.1 °C) 96 %      MAP       --         Physical Exam    Nursing note and vitals reviewed.  Constitutional: She appears well-developed and well-nourished. She is not diaphoretic.   Elderly female, lying on ED stretcher, minimally verbal, opens eyes to stimuli.   HENT:   Head: Normocephalic and atraumatic.   Mouth/Throat: Oropharynx is clear and moist.   Eyes: Conjunctivae are normal.   Pinpoint pupils.   Neck: Normal range of motion. Neck supple.   Cardiovascular: Normal rate, regular rhythm and intact distal pulses.   Murmur (faint) heard.  Pulmonary/Chest: Breath sounds normal. No respiratory distress. She has no wheezes. She has no rhonchi. She has no rales.   Abdominal: Soft. There is no abdominal tenderness.   Musculoskeletal: Normal range of motion. No tenderness or edema.   Neurological: She is alert and oriented to person, place, and time. She has  normal strength.   Moving all extremities.   Skin: Skin is warm and dry. No erythema. No pallor.   Psychiatric: She has a normal mood and affect.         ED Course   Procedures  Labs Reviewed   CBC W/ AUTO DIFFERENTIAL - Abnormal; Notable for the following components:       Result Value    Mean Corpuscular Hemoglobin 31.8 (*)     Gran # (ANC) 8.7 (*)     Gran% 79.2 (*)     Lymph% 10.4 (*)     All other components within normal limits   COMPREHENSIVE METABOLIC PANEL - Abnormal; Notable for the following components:    Glucose 119 (*)     eGFR if  59 (*)     eGFR if non  51 (*)     All other components within normal limits   LIPID PANEL - Abnormal; Notable for the following components:    Cholesterol 214 (*)     Triglycerides 171 (*)     All other components within normal limits   URINALYSIS, REFLEX TO URINE CULTURE - Abnormal; Notable for the following components:    Appearance, UA Hazy (*)     Protein, UA 1+ (*)     Urobilinogen, UA 2.0-3.0 (*)     Leukocytes, UA Trace (*)     All other components within normal limits    Narrative:     Specimen Source->Urine   URINALYSIS MICROSCOPIC - Abnormal; Notable for the following components:    Bacteria Few (*)     Hyaline Casts, UA 6 (*)     All other components within normal limits    Narrative:     Specimen Source->Urine   POCT GLUCOSE - Abnormal; Notable for the following components:    POCT Glucose 111 (*)     All other components within normal limits   PROTIME-INR   TSH   APTT   TROPONIN I   B-TYPE NATRIURETIC PEPTIDE   TROPONIN I     EKG Readings: (Independently Interpreted)   21:51: NSR, HR 73. Frequent PVCs. No ectopy. No STEMI.      ECG Results          ECG 12 lead (Final result)  Result time 09/07/20 21:28:45    Final result by Interface, Lab In [facility] (09/07/20 21:28:45)                 Narrative:    Test Reason : I63.9,    Vent. Rate : 073 BPM     Atrial Rate : 073 BPM     P-R Int : 186 ms          QRS Dur : 074 ms      QT Int :  426 ms       P-R-T Axes : 067 -20 067 degrees     QTc Int : 469 ms    Sinus rhythm with frequent Premature ventricular complexes  Low voltage QRS  Borderline Abnormal ECG  When compared with ECG of 13-AUG-2018 07:26,  Significant changes have occurred  Confirmed by Gerardo CASE, Nabeel GUEVARA ([id number]) on 9/7/2020 9:28:37 PM    Referred By: [email or web address]   SELF           Confirmed By:Nabeel Carrillo MD                            Imaging Results          X-Ray Chest AP Portable (Final result)  Result time 09/06/20 22:41:35    Final result by Erick Hernandez MD (09/06/20 22:41:35)                 Impression:      No obvious evidence of an acute pulmonary process.      Electronically signed by: Erick Hernandez  Date:    09/06/2020  Time:    22:41             Narrative:    EXAMINATION:  XR CHEST AP PORTABLE    CLINICAL HISTORY:  Stroke;    TECHNIQUE:  Single frontal view of the chest was performed.    COMPARISON:  August 13, 2018    FINDINGS:  Single frontal view of the chest reveals chronic interstitial changes of the lung parenchyma.  No indication of a consolidative process or pleural effusion.  No pulmonary nodules.  The heart is normal in size and contour.  The mediastinum is migrated towards the right as compared to the previous examination.  No evidence of free air under the diaphragm.                               CT Head Without Contrast (Final result)  Result time 09/06/20 22:19:36    Final result by Andrew Corral MD (09/06/20 22:19:36)                 Impression:      Marked motion limited examination.    No CT evidence of large territorial infarction.    Old infarction in the right basal ganglia and corona radiata.      Electronically signed by: Andrew Corral MD  Date:    09/06/2020  Time:    22:19             Narrative:    EXAMINATION:  CT HEAD WITHOUT CONTRAST    CLINICAL HISTORY:  Neuro deficit, acute, stroke suspected;    TECHNIQUE:  Low dose axial images were obtained through the head.  Coronal and sagittal reformations  were also performed. Contrast was not administered.  There are significant motion limitations the exam.    COMPARISON:  10/16/2018.    FINDINGS:  The subcutaneous tissues are unremarkable.  The bony calvarium is intact.  The paranasal sinuses are within normal limits.  The mastoid air cells are clear.  The orbits and intraorbital contents are within normal limits.    The craniocervical junction appears intact.  There are no extra-axial fluid collections.  There is no evidence of intracranial hemorrhage.  The ventricles and sulci are prominent, suggestive of cerebral volume loss.  There is an old infarction in the right basal ganglia and right corona radiata.  There are extensive hypodensities within the periventricular and subcortical white matter.  The gray-white differentiation is maintained.  There is no evidence of mass effect.                              X-Rays:   Independently Interpreted Readings:   Other Readings:  CXR NAD    Medical Decision Making:   History:   Old Medical Records: I decided to obtain old medical records.  Old Records Summarized: records from previous admission(s) and records from clinic visits.  Initial Assessment:   87 y.o. female with AMS.  Differential Diagnosis:   Ddx includes CVA/TIA, ICH, orthostatic or vasovagal hypotension/near-syncope, ACS, dysrhythmia, occult infection, other.   ED Management:  EKG no STEMI.    CXR NAD.    CT head with artifact but no acute process.     Labs: CBC, CMP, troponin, BNP, TSH reassuring. Cath UA only trace leuks, no nitrites.    I discussed negative workup with patient's daughter. Patient has started to rouse in ED and is closer to baseline than prior. Unclear etiology of episode -- TIA vs vagal vs ?. Pinpoint pupils suggest toxidrome but daughter states patient has no access to opiates or other sedating medications. Daughter does mention that she just filled the patient's mirabegron and had given it to her prior to this episode, but she states the  pill looked the same as before.     Daughter would prefer patient to be at home due to Alzheimer's, and as her ED workup is reassuring and vitals are stable, this seems appropriate. D/c'ed to home with daughter. Return precautions discussed.                                  Clinical Impression:       ICD-10-CM ICD-9-CM   1. Altered mental status, unspecified altered mental status type  R41.82 780.97   2. Weakness  R53.1 780.79   3. Confusion  R41.0 298.9   4. Major neurocognitive disorder due to multiple etiologies  F02.80 294.9             ED Disposition Condition    Discharge Stable        ED Prescriptions     None        Follow-up Information    None                                    Maeve Burns MD  09/11/20 1700       Maeve Burns MD  09/11/20 1700

## 2020-09-07 NOTE — ED NOTES
"Pt in POV. Denies current complaints. Noted to be sleepy but otherwise no S/S of distress. Family at bedside. In per wheelchair.     Pt family states she was eating and got sleepy and pale and "just didn't look right". When pt asked how she feels pt states "I'm fine". Pt noted to have a hx of dementia and a recent UTI that she has taken antibiotics for.     Hooked to monitor. VSS. Will continue to closely monitor.   "

## 2020-09-07 NOTE — ED NOTES
No s/s of distress. No current C/O pain. VSS. Family at bedside. IV D/C'd without difficulty. Verbalizes understanding of meds/instructions/RX.

## 2020-09-17 ENCOUNTER — TELEPHONE (OUTPATIENT)
Dept: NEUROLOGY | Facility: CLINIC | Age: 85
End: 2020-09-17

## 2020-09-17 NOTE — TELEPHONE ENCOUNTER
Spoke with Pharm they never received the new updated RX from 09- so I called it in, also left message for daughter.                ----- Message from Jacobo Reina sent at 9/17/2020 11:15 AM CDT -----  Contact: Bharti ( daughter ) @[phone]  Caller requesting a return phone call regarding the sleep medication, caller states it's wrong

## 2020-10-01 DIAGNOSIS — R35.0 INCREASED FREQUENCY OF URINATION: ICD-10-CM

## 2020-10-01 DIAGNOSIS — R39.15 URINARY URGENCY: ICD-10-CM

## 2020-10-01 RX ORDER — MIRABEGRON 25 MG/1
25 TABLET, FILM COATED, EXTENDED RELEASE ORAL DAILY
Qty: 90 TABLET | Refills: 3 | Status: SHIPPED | OUTPATIENT
Start: 2020-10-01 | End: 2021-01-01

## 2020-10-02 DIAGNOSIS — F03.90 DEMENTIA WITHOUT BEHAVIORAL DISTURBANCE, UNSPECIFIED DEMENTIA TYPE: ICD-10-CM

## 2020-10-05 RX ORDER — DONEPEZIL HYDROCHLORIDE 10 MG/1
TABLET, FILM COATED ORAL
Qty: 90 TABLET | Refills: 0 | Status: SHIPPED | OUTPATIENT
Start: 2020-10-05 | End: 2020-11-02 | Stop reason: SDUPTHER

## 2020-10-05 RX ORDER — MEMANTINE HYDROCHLORIDE 10 MG/1
10 TABLET ORAL 2 TIMES DAILY
Qty: 180 TABLET | Refills: 3 | Status: SHIPPED | OUTPATIENT
Start: 2020-10-05 | End: 2021-01-01

## 2020-10-07 ENCOUNTER — TELEPHONE (OUTPATIENT)
Dept: NEUROLOGY | Facility: CLINIC | Age: 85
End: 2020-10-07

## 2020-10-07 NOTE — TELEPHONE ENCOUNTER
Massage sent to Dr. Hoff.            ----- Message from Cirilo Butcher MA sent at 10/6/2020  4:34 PM CDT -----  Tristan Hoff [facility] Staff  Caller: Bharti (daughter) @ [phone] (Today,  4:22 PM)         Bharti is asking if the doctor can prescribe an anti anxiety medication for the pt once, due to pt possibly evacuating on Thursday     [facility] #[id number] [address] AT [address] [address]   Phone: [phone] Fax: [phone]

## 2020-10-21 DIAGNOSIS — G47.00 INSOMNIA, UNSPECIFIED TYPE: ICD-10-CM

## 2020-10-22 RX ORDER — TRAZODONE HYDROCHLORIDE 50 MG/1
50 TABLET ORAL 3 TIMES DAILY
Qty: 90 TABLET | Refills: 11 | Status: SHIPPED | OUTPATIENT
Start: 2020-10-22 | End: 2021-01-01

## 2020-10-27 ENCOUNTER — TELEPHONE (OUTPATIENT)
Dept: NEUROLOGY | Facility: CLINIC | Age: 85
End: 2020-10-27

## 2020-10-27 ENCOUNTER — PATIENT MESSAGE (OUTPATIENT)
Dept: NEUROLOGY | Facility: CLINIC | Age: 85
End: 2020-10-27

## 2020-10-27 DIAGNOSIS — F41.9 ANXIETY: Primary | ICD-10-CM

## 2020-10-27 RX ORDER — VENLAFAXINE HYDROCHLORIDE 37.5 MG/1
37.5 CAPSULE, EXTENDED RELEASE ORAL DAILY
Qty: 30 CAPSULE | Refills: 11 | Status: SHIPPED | OUTPATIENT
Start: 2020-10-27 | End: 2021-01-01 | Stop reason: SINTOL

## 2020-11-02 DIAGNOSIS — F03.90 DEMENTIA WITHOUT BEHAVIORAL DISTURBANCE, UNSPECIFIED DEMENTIA TYPE: ICD-10-CM

## 2020-11-03 RX ORDER — DONEPEZIL HYDROCHLORIDE 10 MG/1
TABLET, FILM COATED ORAL
Qty: 90 TABLET | Refills: 1 | Status: SHIPPED | OUTPATIENT
Start: 2020-11-03 | End: 2021-01-05

## 2020-11-06 ENCOUNTER — TELEPHONE (OUTPATIENT)
Dept: NEUROLOGY | Facility: CLINIC | Age: 85
End: 2020-11-06

## 2020-11-06 NOTE — TELEPHONE ENCOUNTER
Please put in Home health orders. Daughter states that patient is declining which she is using the rest room on herself and they need help with her care.            ----- Message from Yani Smyth sent at 11/6/2020 10:40 AM CST -----  Pt daughter would like to receive a call back regarding scheduling an appt for the pt and a referral for home health. Please contact the pt daughter to advise.    Contact info [phone]

## 2020-11-10 ENCOUNTER — TELEPHONE (OUTPATIENT)
Dept: NEUROLOGY | Facility: CLINIC | Age: 85
End: 2020-11-10

## 2020-11-10 ENCOUNTER — PATIENT MESSAGE (OUTPATIENT)
Dept: NEUROLOGY | Facility: CLINIC | Age: 85
End: 2020-11-10

## 2020-11-10 DIAGNOSIS — F02.80 MAJOR NEUROCOGNITIVE DISORDER DUE TO MULTIPLE ETIOLOGIES: Primary | ICD-10-CM

## 2020-11-10 NOTE — TELEPHONE ENCOUNTER
Spoke with daughter explained to her that referral was sent to [facility] and they will get in contact with her and set up the Home Health with them.          ----- Message from Jacobo Reina sent at 11/10/2020  3:28 PM CST -----  Contact: Bharti ( daughter ) @[phone]  Caller calling to get a Referral for Home Health care, pls call

## 2020-11-17 ENCOUNTER — TELEPHONE (OUTPATIENT)
Dept: INTERNAL MEDICINE | Facility: CLINIC | Age: 85
End: 2020-11-17

## 2020-11-17 NOTE — TELEPHONE ENCOUNTER
----- Message from Caroline Mcgraw sent at 11/17/2020  9:40 AM CST -----  Regarding: Daughter/ Bree [phone]  Would like to get medical advice.  Symptoms (please be specific):  Diarrhea  How long has patient had these symptoms:  1 day this time but, she been having it frequently on and off for the past 3 weeks  Pharmacy name and phone # [id number] [place], [place] - [address] [place] [address] [address] AT [facility] [phone] (Phone) [phone] (Fax)      Comments:  They give her immodium AD and it stops but, comes back a few days later.

## 2020-11-18 ENCOUNTER — TELEPHONE (OUTPATIENT)
Dept: INTERNAL MEDICINE | Facility: CLINIC | Age: 85
End: 2020-11-18

## 2020-11-18 DIAGNOSIS — R19.7 DIARRHEA, UNSPECIFIED TYPE: Primary | ICD-10-CM

## 2020-11-18 NOTE — TELEPHONE ENCOUNTER
----- Message from Jolynn Gandhi sent at 11/18/2020  4:38 PM CST -----  Contact: [phone] (daughter)  Patient is returning a phone call.  Who left a message for the patient: Yolanda  Does patient know what this is regarding:    Comments:

## 2020-11-18 NOTE — TELEPHONE ENCOUNTER
Spoke with pts daughter. She says pt has been having some episodes of Diarrhea. She says it has happened  3 times in the last 10 days. Her stools in between have been normal. They give pt imodium and it helps but they want to know how often they can give this and what else to do. She says pt has been on a bland diet but they cant figure out why she has been having diarrhea off and on.no other symptoms Please advise

## 2020-11-19 ENCOUNTER — TELEPHONE (OUTPATIENT)
Dept: INTERNAL MEDICINE | Facility: CLINIC | Age: 85
End: 2020-11-19

## 2020-11-19 NOTE — TELEPHONE ENCOUNTER
----- Message from Alejandra Wong sent at 11/19/2020  9:06 AM CST -----  Regarding: Medicine  Contact: Bree Daughter @ [phone]  Good Morning,Would like to get medical advice.  Symptoms (please be specific):  Diarrhea   How long has patient had these symptoms:  last night 3 am  Pharmacy name and phone # [id number] - [address] AT [facility]  Comments:  Patient is taking new Rx :venlafaxine (EFFEXOR-XR) 37.5 MG 24 hr capsule could that be why she is having diarrhea

## 2020-11-19 NOTE — TELEPHONE ENCOUNTER
----- Message from Sally Dial sent at 11/19/2020  2:26 PM CST -----  Regarding: advice  Contact: Navjot Giron [phone]  Following up on advice from doctor for patient . Please call and advise

## 2020-11-19 NOTE — TELEPHONE ENCOUNTER
Patient is taking new Rx :venlafaxine (EFFEXOR-XR) 37.5 MG 24 hr capsule could that be why she is having diarrhea.     Offered several appts to pt. pts daughter ONLY wants her to see . no appts available on my end.

## 2020-11-19 NOTE — TELEPHONE ENCOUNTER
It is possible effexor is causing diarrhea.  She should speak to the doctor who prescribed for an alternative.  If diarrhea persists, let me know.  She may take imodium as needed, but be careful to avoid constipation.  Ask her to submit stool for infectious causes.

## 2020-11-20 ENCOUNTER — TELEPHONE (OUTPATIENT)
Dept: NEUROLOGY | Facility: CLINIC | Age: 85
End: 2020-11-20

## 2020-11-21 ENCOUNTER — LAB VISIT (OUTPATIENT)
Dept: LAB | Facility: HOSPITAL | Age: 85
End: 2020-11-21
Attending: INTERNAL MEDICINE
Payer: MEDICARE

## 2020-11-21 DIAGNOSIS — R19.7 DIARRHEA, UNSPECIFIED TYPE: ICD-10-CM

## 2020-11-21 PROCEDURE — 87046 STOOL CULTR AEROBIC BACT EA: CPT | Mod: HCNC

## 2020-11-21 PROCEDURE — 89055 LEUKOCYTE ASSESSMENT FECAL: CPT | Mod: HCNC

## 2020-11-21 PROCEDURE — 87045 FECES CULTURE AEROBIC BACT: CPT | Mod: HCNC

## 2020-11-21 PROCEDURE — 87427 SHIGA-LIKE TOXIN AG IA: CPT | Mod: HCNC

## 2020-11-21 PROCEDURE — 87324 CLOSTRIDIUM AG IA: CPT | Mod: HCNC

## 2020-11-21 PROCEDURE — 87449 NOS EACH ORGANISM AG IA: CPT | Mod: HCNC

## 2020-11-21 PROCEDURE — 87209 SMEAR COMPLEX STAIN: CPT | Mod: HCNC

## 2020-11-21 PROCEDURE — 87329 GIARDIA AG IA: CPT | Mod: HCNC

## 2020-11-22 LAB
C DIFF GDH STL QL: NEGATIVE
C DIFF TOX A+B STL QL IA: NEGATIVE
WBC #/AREA STL HPF: NORMAL /[HPF]

## 2020-11-23 ENCOUNTER — TELEPHONE (OUTPATIENT)
Dept: INTERNAL MEDICINE | Facility: CLINIC | Age: 85
End: 2020-11-23

## 2020-11-23 LAB
CRYPTOSP AG STL QL IA: NEGATIVE
E COLI SXT1 STL QL IA: NEGATIVE
E COLI SXT2 STL QL IA: NEGATIVE
G LAMBLIA AG STL QL IA: NEGATIVE

## 2020-11-23 NOTE — TELEPHONE ENCOUNTER
----- Message from Nieves Multani sent at 11/23/2020  9:40 AM CST -----  Contact: Bree(daughter) [phone]  Calling to get test results.  Name of test (lab, x-ray): specimen labs  Date of test: 11/21  Where was the test performed: [facility]  Comments: Pt daughter states she has been speaking with [employer]. Pt daughter states the pt is still experiencing diarrhea.

## 2020-11-23 NOTE — TELEPHONE ENCOUNTER
Stool studies all negative thus far, so I would recommend GI consult.  Continue to take imodium as needed and keep well hydrated.  If she appears dehydrated she should go to ED for IV fluids

## 2020-11-23 NOTE — TELEPHONE ENCOUNTER
Spoke with the daughter Bharti . Results given . Stool studies all negative.Recommend GI CN . Continue imodium as needed. Keep well hydrated. If dehydrated go to the ED for IV fluids. Daughter verbalized understanding.

## 2020-11-24 LAB — O+P STL MICRO: NORMAL

## 2020-11-25 LAB — BACTERIA STL CULT: NORMAL

## 2020-12-03 ENCOUNTER — PATIENT MESSAGE (OUTPATIENT)
Dept: NEUROLOGY | Facility: CLINIC | Age: 85
End: 2020-12-03

## 2020-12-04 ENCOUNTER — PATIENT MESSAGE (OUTPATIENT)
Dept: NEUROLOGY | Facility: CLINIC | Age: 85
End: 2020-12-04

## 2020-12-07 ENCOUNTER — TELEPHONE (OUTPATIENT)
Dept: INTERNAL MEDICINE | Facility: CLINIC | Age: 85
End: 2020-12-07

## 2020-12-07 NOTE — TELEPHONE ENCOUNTER
I don't understand who to give these orders to .  Is she getting home health/ - I don't believe I have written HH orders.

## 2020-12-07 NOTE — TELEPHONE ENCOUNTER
----- Message from Cleo Villar sent at 12/7/2020 12:35 PM CST -----  Regarding: Pts daughter Ms. Giron Mobile# [phone]  Ms. Giron is calling in regards to her wanting for you to add the following things to the patient's home care order please.       Please add..    Patient need medical help with mobility, and medication please. Ms. Giron would like for you to write these orders for one year please.

## 2020-12-07 NOTE — TELEPHONE ENCOUNTER
----- Message from Ann Santacruz sent at 12/7/2020 10:09 AM CST -----  Contact: PT bettina Giron@[phone]--  Patient Requesting Order    Order Needed:-In home care--    Additional Information:Pt daughter calling to see if the doctor able to get [facility] listed Per Daughter states please make sure that the orders needs to say in home care. Please call for fax number.

## 2021-01-01 ENCOUNTER — TELEPHONE (OUTPATIENT)
Dept: INTERNAL MEDICINE | Facility: CLINIC | Age: 86
End: 2021-01-01
Payer: MEDICARE

## 2021-01-01 ENCOUNTER — TELEPHONE (OUTPATIENT)
Dept: UROLOGY | Facility: CLINIC | Age: 86
End: 2021-01-01

## 2021-01-01 ENCOUNTER — OFFICE VISIT (OUTPATIENT)
Dept: UROLOGY | Facility: CLINIC | Age: 86
End: 2021-01-01
Payer: MEDICARE

## 2021-01-01 ENCOUNTER — PES CALL (OUTPATIENT)
Dept: ADMINISTRATIVE | Facility: CLINIC | Age: 86
End: 2021-01-01

## 2021-01-01 ENCOUNTER — OFFICE VISIT (OUTPATIENT)
Dept: NEUROLOGY | Facility: CLINIC | Age: 86
End: 2021-01-01
Payer: MEDICARE

## 2021-01-01 ENCOUNTER — PATIENT MESSAGE (OUTPATIENT)
Dept: ADMINISTRATIVE | Facility: HOSPITAL | Age: 86
End: 2021-01-01

## 2021-01-01 ENCOUNTER — PATIENT MESSAGE (OUTPATIENT)
Dept: UROLOGY | Facility: CLINIC | Age: 86
End: 2021-01-01
Payer: MEDICARE

## 2021-01-01 VITALS
HEIGHT: 63 IN | DIASTOLIC BLOOD PRESSURE: 60 MMHG | SYSTOLIC BLOOD PRESSURE: 133 MMHG | BODY MASS INDEX: 21.17 KG/M2 | HEART RATE: 78 BPM | WEIGHT: 119.5 LBS

## 2021-01-01 DIAGNOSIS — F02.80 MAJOR NEUROCOGNITIVE DISORDER DUE TO MULTIPLE ETIOLOGIES: Primary | ICD-10-CM

## 2021-01-01 DIAGNOSIS — R39.15 URINARY URGENCY: ICD-10-CM

## 2021-01-01 DIAGNOSIS — G47.00 INSOMNIA, UNSPECIFIED TYPE: ICD-10-CM

## 2021-01-01 DIAGNOSIS — F41.9 ANXIETY: ICD-10-CM

## 2021-01-01 DIAGNOSIS — R35.0 INCREASED FREQUENCY OF URINATION: ICD-10-CM

## 2021-01-01 PROCEDURE — 1101F PR PT FALLS ASSESS DOC 0-1 FALLS W/OUT INJ PAST YR: ICD-10-PCS | Mod: HCNC,CPTII,S$GLB, | Performed by: NEUROLOGICAL SURGERY

## 2021-01-01 PROCEDURE — 99214 PR OFFICE/OUTPT VISIT, EST, LEVL IV, 30-39 MIN: ICD-10-PCS | Mod: HCNC,S$GLB,, | Performed by: NEUROLOGICAL SURGERY

## 2021-01-01 PROCEDURE — 1126F PR PAIN SEVERITY QUANTIFIED, NO PAIN PRESENT: ICD-10-PCS | Mod: HCNC,CPTII,S$GLB, | Performed by: NEUROLOGICAL SURGERY

## 2021-01-01 PROCEDURE — 1126F AMNT PAIN NOTED NONE PRSNT: CPT | Mod: HCNC,CPTII,S$GLB, | Performed by: NEUROLOGICAL SURGERY

## 2021-01-01 PROCEDURE — 3288F PR FALLS RISK ASSESSMENT DOCUMENTED: ICD-10-PCS | Mod: HCNC,CPTII,S$GLB, | Performed by: NEUROLOGICAL SURGERY

## 2021-01-01 PROCEDURE — 99999 PR PBB SHADOW E&M-EST. PATIENT-LVL III: ICD-10-PCS | Mod: PBBFAC,HCNC,, | Performed by: NEUROLOGICAL SURGERY

## 2021-01-01 PROCEDURE — 3288F FALL RISK ASSESSMENT DOCD: CPT | Mod: HCNC,CPTII,S$GLB, | Performed by: NEUROLOGICAL SURGERY

## 2021-01-01 PROCEDURE — 99499 UNLISTED E&M SERVICE: CPT | Mod: HCNC,S$GLB,, | Performed by: NEUROLOGICAL SURGERY

## 2021-01-01 PROCEDURE — 1159F PR MEDICATION LIST DOCUMENTED IN MEDICAL RECORD: ICD-10-PCS | Mod: HCNC,CPTII,S$GLB, | Performed by: NEUROLOGICAL SURGERY

## 2021-01-01 PROCEDURE — 1157F ADVNC CARE PLAN IN RCRD: CPT | Mod: CPTII,95,, | Performed by: UROLOGY

## 2021-01-01 PROCEDURE — 99212 PR OFFICE/OUTPT VISIT, EST, LEVL II, 10-19 MIN: ICD-10-PCS | Mod: 95,,, | Performed by: UROLOGY

## 2021-01-01 PROCEDURE — 1157F PR ADVANCE CARE PLAN OR EQUIV PRESENT IN MEDICAL RECORD: ICD-10-PCS | Mod: HCNC,CPTII,S$GLB, | Performed by: NEUROLOGICAL SURGERY

## 2021-01-01 PROCEDURE — 1101F PT FALLS ASSESS-DOCD LE1/YR: CPT | Mod: HCNC,CPTII,S$GLB, | Performed by: NEUROLOGICAL SURGERY

## 2021-01-01 PROCEDURE — 99999 PR PBB SHADOW E&M-EST. PATIENT-LVL III: CPT | Mod: PBBFAC,HCNC,, | Performed by: NEUROLOGICAL SURGERY

## 2021-01-01 PROCEDURE — 1159F MED LIST DOCD IN RCRD: CPT | Mod: HCNC,CPTII,S$GLB, | Performed by: NEUROLOGICAL SURGERY

## 2021-01-01 PROCEDURE — 1157F ADVNC CARE PLAN IN RCRD: CPT | Mod: HCNC,CPTII,S$GLB, | Performed by: NEUROLOGICAL SURGERY

## 2021-01-01 PROCEDURE — 99499 RISK ADDL DX/OHS AUDIT: ICD-10-PCS | Mod: HCNC,S$GLB,, | Performed by: NEUROLOGICAL SURGERY

## 2021-01-01 PROCEDURE — 99214 OFFICE O/P EST MOD 30 MIN: CPT | Mod: HCNC,S$GLB,, | Performed by: NEUROLOGICAL SURGERY

## 2021-01-01 PROCEDURE — 1157F PR ADVANCE CARE PLAN OR EQUIV PRESENT IN MEDICAL RECORD: ICD-10-PCS | Mod: CPTII,95,, | Performed by: UROLOGY

## 2021-01-01 PROCEDURE — 99212 OFFICE O/P EST SF 10 MIN: CPT | Mod: 95,,, | Performed by: UROLOGY

## 2021-01-01 RX ORDER — MIRABEGRON 25 MG/1
25 TABLET, FILM COATED, EXTENDED RELEASE ORAL DAILY
Qty: 90 TABLET | Refills: 3 | Status: SHIPPED | OUTPATIENT
Start: 2021-01-01 | End: 2022-01-01

## 2021-01-01 RX ORDER — TRAZODONE HYDROCHLORIDE 50 MG/1
TABLET ORAL
Qty: 90 TABLET | Refills: 0 | Status: SHIPPED | OUTPATIENT
Start: 2021-01-01 | End: 2021-01-01

## 2021-01-05 ENCOUNTER — TELEPHONE (OUTPATIENT)
Dept: INTERNAL MEDICINE | Facility: CLINIC | Age: 86
End: 2021-01-05

## 2021-01-13 DIAGNOSIS — F02.80 MAJOR NEUROCOGNITIVE DISORDER DUE TO MULTIPLE ETIOLOGIES: Primary | ICD-10-CM

## 2021-01-14 ENCOUNTER — PATIENT MESSAGE (OUTPATIENT)
Dept: NEUROLOGY | Facility: CLINIC | Age: 86
End: 2021-01-14

## 2021-01-22 ENCOUNTER — PATIENT MESSAGE (OUTPATIENT)
Dept: ADMINISTRATIVE | Facility: OTHER | Age: 86
End: 2021-01-22

## 2021-01-27 PROCEDURE — G0180 PR HOME HEALTH MD CERTIFICATION: ICD-10-PCS | Mod: ,,, | Performed by: NEUROLOGICAL SURGERY

## 2021-01-27 PROCEDURE — G0180 MD CERTIFICATION HHA PATIENT: HCPCS | Mod: ,,, | Performed by: NEUROLOGICAL SURGERY

## 2021-02-02 ENCOUNTER — DOCUMENT SCAN (OUTPATIENT)
Dept: HOME HEALTH SERVICES | Facility: HOSPITAL | Age: 86
End: 2021-02-02
Payer: MEDICARE

## 2021-02-02 ENCOUNTER — TELEPHONE (OUTPATIENT)
Dept: INTERNAL MEDICINE | Facility: CLINIC | Age: 86
End: 2021-02-02

## 2021-02-05 ENCOUNTER — PES CALL (OUTPATIENT)
Dept: ADMINISTRATIVE | Facility: CLINIC | Age: 86
End: 2021-02-05

## 2021-02-09 ENCOUNTER — DOCUMENT SCAN (OUTPATIENT)
Dept: HOME HEALTH SERVICES | Facility: HOSPITAL | Age: 86
End: 2021-02-09
Payer: MEDICARE

## 2021-02-10 ENCOUNTER — EXTERNAL HOME HEALTH (OUTPATIENT)
Dept: HOME HEALTH SERVICES | Facility: HOSPITAL | Age: 86
End: 2021-02-10
Payer: MEDICARE

## 2021-02-17 ENCOUNTER — DOCUMENT SCAN (OUTPATIENT)
Dept: HOME HEALTH SERVICES | Facility: HOSPITAL | Age: 86
End: 2021-02-17
Payer: MEDICARE

## 2021-03-09 ENCOUNTER — DOCUMENT SCAN (OUTPATIENT)
Dept: HOME HEALTH SERVICES | Facility: HOSPITAL | Age: 86
End: 2021-03-09
Payer: MEDICARE

## 2021-10-07 PROBLEM — F41.9 ANXIETY: Status: ACTIVE | Noted: 2021-01-01

## 2021-10-07 PROBLEM — G47.00 INSOMNIA: Status: ACTIVE | Noted: 2021-01-01

## 2021-12-01 NOTE — TELEPHONE ENCOUNTER
----- Message from Chastity Painter sent at 12/1/2021 12:14 PM CST -----  Contact: desire [phone]  Pts daughter is calling in regards to the pt she states they had a nurse come in to access the pt for physical therapy and she states that the request was sent to Dr Castrejon and they are trying to make sure if the request has been received please advise and give a return call so they know if they can go any further with the therapy..

## 2021-12-15 NOTE — TELEPHONE ENCOUNTER
pts daughter would like to know if a order for HH and PT can be placed. She says patient was doing a lot better while getting PT. She was a lot stronger and able to do more. Please advise

## 2021-12-15 NOTE — TELEPHONE ENCOUNTER
----- Message from Cheri León sent at 12/15/2021 10:39 AM CST -----  Contact: Daughter/Bree [phone]  Requesting to speak with you regarding home health PT.    Please call and advise.    Thank You

## 2022-01-01 ENCOUNTER — PATIENT OUTREACH (OUTPATIENT)
Dept: ADMINISTRATIVE | Facility: HOSPITAL | Age: 87
End: 2022-01-01
Payer: MEDICARE

## 2022-01-01 ENCOUNTER — TELEPHONE (OUTPATIENT)
Dept: INTERNAL MEDICINE | Facility: CLINIC | Age: 87
End: 2022-01-01
Payer: MEDICARE

## 2022-01-01 ENCOUNTER — TELEPHONE (OUTPATIENT)
Dept: ORTHOPEDICS | Facility: CLINIC | Age: 87
End: 2022-01-01
Payer: MEDICARE

## 2022-01-01 ENCOUNTER — PES CALL (OUTPATIENT)
Dept: ADMINISTRATIVE | Facility: CLINIC | Age: 87
End: 2022-01-01
Payer: MEDICARE

## 2022-01-01 ENCOUNTER — PATIENT MESSAGE (OUTPATIENT)
Dept: NEUROLOGY | Facility: CLINIC | Age: 87
End: 2022-01-01
Payer: MEDICARE

## 2022-01-01 ENCOUNTER — HOSPITAL ENCOUNTER (OUTPATIENT)
Dept: RADIOLOGY | Facility: HOSPITAL | Age: 87
Discharge: HOME | End: 2022-04-19
Attending: PHYSICIAN ASSISTANT
Payer: MEDICARE

## 2022-01-01 ENCOUNTER — PATIENT MESSAGE (OUTPATIENT)
Dept: INTERNAL MEDICINE | Facility: CLINIC | Age: 87
End: 2022-01-01
Payer: MEDICARE

## 2022-01-01 ENCOUNTER — CARE AT HOME (OUTPATIENT)
Dept: HOME HEALTH SERVICES | Facility: CLINIC | Age: 87
End: 2022-01-01
Payer: MEDICARE

## 2022-01-01 ENCOUNTER — PATIENT MESSAGE (OUTPATIENT)
Dept: ADMINISTRATIVE | Facility: HOSPITAL | Age: 87
End: 2022-01-01
Payer: MEDICARE

## 2022-01-01 ENCOUNTER — OFFICE VISIT (OUTPATIENT)
Dept: ORTHOPEDICS | Facility: CLINIC | Age: 87
End: 2022-01-01
Payer: MEDICARE

## 2022-01-01 ENCOUNTER — EXTERNAL HOME HEALTH (OUTPATIENT)
Dept: HOME HEALTH SERVICES | Facility: HOSPITAL | Age: 87
End: 2022-01-01
Payer: MEDICARE

## 2022-01-01 ENCOUNTER — OUTPATIENT CASE MANAGEMENT (OUTPATIENT)
Dept: ADMINISTRATIVE | Facility: OTHER | Age: 87
End: 2022-01-01
Payer: MEDICARE

## 2022-01-01 ENCOUNTER — NURSE TRIAGE (OUTPATIENT)
Dept: ADMINISTRATIVE | Facility: CLINIC | Age: 87
End: 2022-01-01
Payer: MEDICARE

## 2022-01-01 ENCOUNTER — HOSPITAL ENCOUNTER (OUTPATIENT)
Facility: HOSPITAL | Age: 87
Discharge: HOME | End: 2022-04-12
Attending: EMERGENCY MEDICINE | Admitting: INTERNAL MEDICINE
Payer: MEDICARE

## 2022-01-01 VITALS
TEMPERATURE: 98 F | WEIGHT: 111.31 LBS | HEART RATE: 97 BPM | SYSTOLIC BLOOD PRESSURE: 132 MMHG | RESPIRATION RATE: 18 BRPM | DIASTOLIC BLOOD PRESSURE: 61 MMHG | HEIGHT: 63 IN | BODY MASS INDEX: 19.72 KG/M2 | OXYGEN SATURATION: 96 %

## 2022-01-01 VITALS — WEIGHT: 111.31 LBS | BODY MASS INDEX: 20.48 KG/M2 | HEIGHT: 62 IN

## 2022-01-01 VITALS — RESPIRATION RATE: 18 BRPM | WEIGHT: 111 LBS | BODY MASS INDEX: 20.3 KG/M2

## 2022-01-01 DIAGNOSIS — G30.1 LATE ONSET ALZHEIMER'S DEMENTIA WITH BEHAVIORAL DISTURBANCE: ICD-10-CM

## 2022-01-01 DIAGNOSIS — F02.80 MAJOR NEUROCOGNITIVE DISORDER DUE TO MULTIPLE ETIOLOGIES: ICD-10-CM

## 2022-01-01 DIAGNOSIS — S62.337A CLOSED DISPLACED FRACTURE OF NECK OF FIFTH METACARPAL BONE OF LEFT HAND, INITIAL ENCOUNTER: ICD-10-CM

## 2022-01-01 DIAGNOSIS — Z78.9 IMPAIRED MOBILITY AND ACTIVITIES OF DAILY LIVING: ICD-10-CM

## 2022-01-01 DIAGNOSIS — F41.9 ANXIETY: Primary | ICD-10-CM

## 2022-01-01 DIAGNOSIS — F41.9 ANXIETY: ICD-10-CM

## 2022-01-01 DIAGNOSIS — R07.9 CHEST PAIN: ICD-10-CM

## 2022-01-01 DIAGNOSIS — S62.337A CLOSED DISPLACED FRACTURE OF NECK OF FIFTH METACARPAL BONE OF LEFT HAND, INITIAL ENCOUNTER: Primary | ICD-10-CM

## 2022-01-01 DIAGNOSIS — F02.818 LATE ONSET ALZHEIMER'S DEMENTIA WITH BEHAVIORAL DISTURBANCE: ICD-10-CM

## 2022-01-01 DIAGNOSIS — I70.0 ATHEROSCLEROSIS OF AORTIC ARCH: ICD-10-CM
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
## 2022-01-01 DIAGNOSIS — Z74.09 IMPAIRED MOBILITY AND ACTIVITIES OF DAILY LIVING: ICD-10-CM

## 2022-01-01 DIAGNOSIS — I63.9 CEREBROVASCULAR ACCIDENT (CVA), UNSPECIFIED MECHANISM: ICD-10-CM

## 2022-01-01 DIAGNOSIS — Z71.89 ADVANCED CARE PLANNING/COUNSELING DISCUSSION: ICD-10-CM

## 2022-01-01 DIAGNOSIS — M79.642 LEFT HAND PAIN: Primary | ICD-10-CM

## 2022-01-01 DIAGNOSIS — I63.9 CEREBROVASCULAR ACCIDENT (CVA), UNSPECIFIED MECHANISM: Primary | ICD-10-CM

## 2022-01-01 DIAGNOSIS — F02.80 MAJOR NEUROCOGNITIVE DISORDER DUE TO MULTIPLE ETIOLOGIES: Primary | ICD-10-CM

## 2022-01-01 DIAGNOSIS — R53.1 GENERALIZED WEAKNESS: ICD-10-CM

## 2022-01-01 LAB
ALBUMIN SERPL BCP-MCNC: 3.5 G/DL (ref 3.5–5.2)
ALP SERPL-CCNC: 72 U/L (ref 55–135)
ALT SERPL W/O P-5'-P-CCNC: 18 U/L (ref 10–44)
AMORPH CRY UR QL COMP ASSIST: ABNORMAL
ANION GAP SERPL CALC-SCNC: 10 MMOL/L (ref 8–16)
ANION GAP SERPL CALC-SCNC: 11 MMOL/L (ref 8–16)
ANION GAP SERPL CALC-SCNC: 13 MMOL/L (ref 8–16)
AST SERPL-CCNC: 23 U/L (ref 10–40)
BACTERIA #/AREA URNS AUTO: ABNORMAL /HPF
BASOPHILS # BLD AUTO: 0.04 K/UL (ref 0–0.2)
BASOPHILS # BLD AUTO: 0.04 K/UL (ref 0–0.2)
BASOPHILS # BLD AUTO: 0.05 K/UL (ref 0–0.2)
BASOPHILS NFR BLD: 0.3 % (ref 0–1.9)
BASOPHILS NFR BLD: 0.4 % (ref 0–1.9)
BASOPHILS NFR BLD: 0.4 % (ref 0–1.9)
BILIRUB SERPL-MCNC: 0.4 MG/DL (ref 0.1–1)
BILIRUB UR QL STRIP: NEGATIVE
BUN SERPL-MCNC: 13 MG/DL (ref 8–23)
BUN SERPL-MCNC: 19 MG/DL (ref 8–23)
BUN SERPL-MCNC: 20 MG/DL (ref 8–23)
CALCIUM SERPL-MCNC: 8.8 MG/DL (ref 8.7–10.5)
CALCIUM SERPL-MCNC: 9.1 MG/DL (ref 8.7–10.5)
CALCIUM SERPL-MCNC: 9.4 MG/DL (ref 8.7–10.5)
CHLORIDE SERPL-SCNC: 106 MMOL/L (ref 95–110)
CHLORIDE SERPL-SCNC: 106 MMOL/L (ref 95–110)
CHLORIDE SERPL-SCNC: 99 MMOL/L (ref 95–110)
CK SERPL-CCNC: 187 U/L (ref 20–180)
CLARITY UR REFRACT.AUTO: ABNORMAL
CO2 SERPL-SCNC: 24 MMOL/L (ref 23–29)
CO2 SERPL-SCNC: 24 MMOL/L (ref 23–29)
CO2 SERPL-SCNC: 25 MMOL/L (ref 23–29)
COLOR UR AUTO: ABNORMAL
CREAT SERPL-MCNC: 0.6 MG/DL (ref 0.5–1.4)
CREAT SERPL-MCNC: 0.7 MG/DL (ref 0.5–1.4)
CREAT SERPL-MCNC: 0.7 MG/DL (ref 0.5–1.4)
DIFFERENTIAL METHOD: ABNORMAL
EOSINOPHIL # BLD AUTO: 0 K/UL (ref 0–0.5)
EOSINOPHIL # BLD AUTO: 0 K/UL (ref 0–0.5)
EOSINOPHIL # BLD AUTO: 0.1 K/UL (ref 0–0.5)
EOSINOPHIL NFR BLD: 0 % (ref 0–8)
EOSINOPHIL NFR BLD: 0.2 % (ref 0–8)
EOSINOPHIL NFR BLD: 0.5 % (ref 0–8)
ERYTHROCYTE [DISTWIDTH] IN BLOOD BY AUTOMATED COUNT: 12 % (ref 11.5–14.5)
EST. GFR  (AFRICAN AMERICAN): >60 ML/MIN/1.73 M^2
EST. GFR  (NON AFRICAN AMERICAN): >60 ML/MIN/1.73 M^2
GLUCOSE SERPL-MCNC: 115 MG/DL (ref 70–110)
GLUCOSE SERPL-MCNC: 127 MG/DL (ref 70–110)
GLUCOSE SERPL-MCNC: 99 MG/DL (ref 70–110)
GLUCOSE UR QL STRIP: NEGATIVE
HCT VFR BLD AUTO: 37.2 % (ref 37–48.5)
HCT VFR BLD AUTO: 38 % (ref 37–48.5)
HCT VFR BLD AUTO: 38.1 % (ref 37–48.5)
HGB BLD-MCNC: 12.2 G/DL (ref 12–16)
HGB BLD-MCNC: 12.4 G/DL (ref 12–16)
HGB BLD-MCNC: 12.7 G/DL (ref 12–16)
HGB UR QL STRIP: NEGATIVE
HYALINE CASTS UR QL AUTO: 0 /LPF
IMM GRANULOCYTES # BLD AUTO: 0.05 K/UL (ref 0–0.04)
IMM GRANULOCYTES # BLD AUTO: 0.05 K/UL (ref 0–0.04)
IMM GRANULOCYTES # BLD AUTO: 0.13 K/UL (ref 0–0.04)
IMM GRANULOCYTES NFR BLD AUTO: 0.4 % (ref 0–0.5)
IMM GRANULOCYTES NFR BLD AUTO: 0.5 % (ref 0–0.5)
IMM GRANULOCYTES NFR BLD AUTO: 0.9 % (ref 0–0.5)
KETONES UR QL STRIP: ABNORMAL
LEUKOCYTE ESTERASE UR QL STRIP: NEGATIVE
LYMPHOCYTES # BLD AUTO: 0.6 K/UL (ref 1–4.8)
LYMPHOCYTES # BLD AUTO: 0.6 K/UL (ref 1–4.8)
LYMPHOCYTES # BLD AUTO: 1.2 K/UL (ref 1–4.8)
LYMPHOCYTES NFR BLD: 11.7 % (ref 18–48)
LYMPHOCYTES NFR BLD: 4.4 % (ref 18–48)
LYMPHOCYTES NFR BLD: 5.2 % (ref 18–48)
MAGNESIUM SERPL-MCNC: 1.8 MG/DL (ref 1.6–2.6)
MAGNESIUM SERPL-MCNC: 1.9 MG/DL (ref 1.6–2.6)
MCH RBC QN AUTO: 31.6 PG (ref 27–31)
MCH RBC QN AUTO: 31.9 PG (ref 27–31)
MCH RBC QN AUTO: 32.3 PG (ref 27–31)
MCHC RBC AUTO-ENTMCNC: 32.6 G/DL (ref 32–36)
MCHC RBC AUTO-ENTMCNC: 32.8 G/DL (ref 32–36)
MCHC RBC AUTO-ENTMCNC: 33.3 G/DL (ref 32–36)
MCV RBC AUTO: 96 FL (ref 82–98)
MCV RBC AUTO: 97 FL (ref 82–98)
MCV RBC AUTO: 98 FL (ref 82–98)
MICROSCOPIC COMMENT: ABNORMAL
MONOCYTES # BLD AUTO: 1.3 K/UL (ref 0.3–1)
MONOCYTES # BLD AUTO: 1.4 K/UL (ref 0.3–1)
MONOCYTES # BLD AUTO: 1.5 K/UL (ref 0.3–1)
MONOCYTES NFR BLD: 10 % (ref 4–15)
MONOCYTES NFR BLD: 12.8 % (ref 4–15)
MONOCYTES NFR BLD: 12.9 % (ref 4–15)
NEUTROPHILS # BLD AUTO: 12 K/UL (ref 1.8–7.7)
NEUTROPHILS # BLD AUTO: 7.5 K/UL (ref 1.8–7.7)
NEUTROPHILS # BLD AUTO: 9.7 K/UL (ref 1.8–7.7)
NEUTROPHILS NFR BLD: 74 % (ref 38–73)
NEUTROPHILS NFR BLD: 81.1 % (ref 38–73)
NEUTROPHILS NFR BLD: 84.3 % (ref 38–73)
NITRITE UR QL STRIP: POSITIVE
NRBC BLD-RTO: 0 /100 WBC
PH UR STRIP: 5 [PH] (ref 5–8)
PHOSPHATE SERPL-MCNC: 2.7 MG/DL (ref 2.7–4.5)
PHOSPHATE SERPL-MCNC: 3.6 MG/DL (ref 2.7–4.5)
PLATELET # BLD AUTO: 201 K/UL (ref 150–450)
PLATELET # BLD AUTO: 218 K/UL (ref 150–450)
PLATELET # BLD AUTO: 241 K/UL (ref 150–450)
PMV BLD AUTO: 11.4 FL (ref 9.2–12.9)
PMV BLD AUTO: 11.5 FL (ref 9.2–12.9)
PMV BLD AUTO: 11.5 FL (ref 9.2–12.9)
POTASSIUM SERPL-SCNC: 3.8 MMOL/L (ref 3.5–5.1)
PROT SERPL-MCNC: 6.5 G/DL (ref 6–8.4)
PROT UR QL STRIP: ABNORMAL
RBC # BLD AUTO: 3.78 M/UL (ref 4–5.4)
RBC # BLD AUTO: 3.92 M/UL (ref 4–5.4)
RBC # BLD AUTO: 3.98 M/UL (ref 4–5.4)
RBC #/AREA URNS AUTO: 9 /HPF (ref 0–4)
SODIUM SERPL-SCNC: 136 MMOL/L (ref 136–145)
SODIUM SERPL-SCNC: 141 MMOL/L (ref 136–145)
SODIUM SERPL-SCNC: 141 MMOL/L (ref 136–145)
SP GR UR STRIP: >1.03 (ref 1–1.03)
SQUAMOUS #/AREA URNS AUTO: 3 /HPF
TROPONIN I SERPL DL<=0.01 NG/ML-MCNC: <0.006 NG/ML (ref 0–0.03)
TSH SERPL DL<=0.005 MIU/L-ACNC: 2.01 UIU/ML (ref 0.4–4)
URN SPEC COLLECT METH UR: ABNORMAL
WBC # BLD AUTO: 10.18 K/UL (ref 3.9–12.7)
WBC # BLD AUTO: 12.02 K/UL (ref 3.9–12.7)
WBC # BLD AUTO: 14.27 K/UL (ref 3.9–12.7)
WBC #/AREA URNS AUTO: 0 /HPF (ref 0–5)
WBC CLUMPS UR QL AUTO: ABNORMAL

## 2022-01-01 PROCEDURE — 99499 UNLISTED E&M SERVICE: CPT | Mod: S$GLB,,, | Performed by: PHYSICIAN ASSISTANT

## 2022-01-01 PROCEDURE — 1126F PR PAIN SEVERITY QUANTIFIED, NO PAIN PRESENT: ICD-10-PCS | Mod: CPTII,S$GLB,, | Performed by: PHYSICIAN ASSISTANT

## 2022-01-01 PROCEDURE — 80053 COMPREHEN METABOLIC PANEL: CPT | Performed by: EMERGENCY MEDICINE

## 2022-01-01 PROCEDURE — 83735 ASSAY OF MAGNESIUM: CPT | Performed by: PHYSICIAN ASSISTANT

## 2022-01-01 PROCEDURE — 99999 PR PBB SHADOW E&M-EST. PATIENT-LVL III: CPT | Mod: PBBFAC,,, | Performed by: PHYSICIAN ASSISTANT

## 2022-01-01 PROCEDURE — 1101F PT FALLS ASSESS-DOCD LE1/YR: CPT | Mod: CPTII,S$GLB,, | Performed by: PHYSICIAN ASSISTANT

## 2022-01-01 PROCEDURE — 99499 RISK ADDL DX/OHS AUDIT: ICD-10-PCS | Mod: S$GLB,,, | Performed by: NURSE PRACTITIONER

## 2022-01-01 PROCEDURE — 99226 PR SUBSEQUENT OBSERVATION CARE,LEVEL III: CPT | Mod: ,,,

## 2022-01-01 PROCEDURE — 99226 PR SUBSEQUENT OBSERVATION CARE,LEVEL III: ICD-10-PCS | Mod: ,,,

## 2022-01-01 PROCEDURE — 84443 ASSAY THYROID STIM HORMONE: CPT | Performed by: EMERGENCY MEDICINE

## 2022-01-01 PROCEDURE — 99900035 HC TECH TIME PER 15 MIN (STAT)

## 2022-01-01 PROCEDURE — 99285 EMERGENCY DEPT VISIT HI MDM: CPT | Mod: 25

## 2022-01-01 PROCEDURE — 94761 N-INVAS EAR/PLS OXIMETRY MLT: CPT

## 2022-01-01 PROCEDURE — 97166 OT EVAL MOD COMPLEX 45 MIN: CPT

## 2022-01-01 PROCEDURE — 25000003 PHARM REV CODE 250: Performed by: EMERGENCY MEDICINE

## 2022-01-01 PROCEDURE — 99225 PR SUBSEQUENT OBSERVATION CARE,LEVEL II: CPT | Mod: ,,, | Performed by: ORTHOPAEDIC SURGERY

## 2022-01-01 PROCEDURE — 99499 UNLISTED E&M SERVICE: CPT | Mod: S$GLB,,, | Performed by: NURSE PRACTITIONER

## 2022-01-01 PROCEDURE — 99220 PR INITIAL OBSERVATION CARE,LEVL III: CPT | Mod: ,,, | Performed by: PHYSICIAN ASSISTANT

## 2022-01-01 PROCEDURE — 85025 COMPLETE CBC W/AUTO DIFF WBC: CPT | Performed by: PHYSICIAN ASSISTANT

## 2022-01-01 PROCEDURE — 25000003 PHARM REV CODE 250

## 2022-01-01 PROCEDURE — G0180 PR HOME HEALTH MD CERTIFICATION: ICD-10-PCS | Mod: ,,, | Performed by: INTERNAL MEDICINE

## 2022-01-01 PROCEDURE — 27100171 HC OXYGEN HIGH FLOW UP TO 24 HOURS

## 2022-01-01 PROCEDURE — 99284 EMERGENCY DEPT VISIT MOD MDM: CPT | Mod: ,,, | Performed by: EMERGENCY MEDICINE

## 2022-01-01 PROCEDURE — 97116 GAIT TRAINING THERAPY: CPT | Mod: CQ

## 2022-01-01 PROCEDURE — 99497 ADVNCD CARE PLAN 30 MIN: CPT | Mod: 25,,, | Performed by: PHYSICIAN ASSISTANT

## 2022-01-01 PROCEDURE — G0378 HOSPITAL OBSERVATION PER HR: HCPCS

## 2022-01-01 PROCEDURE — G0180 MD CERTIFICATION HHA PATIENT: HCPCS | Mod: ,,, | Performed by: NEUROLOGICAL SURGERY

## 2022-01-01 PROCEDURE — 36415 COLL VENOUS BLD VENIPUNCTURE: CPT | Performed by: PHYSICIAN ASSISTANT

## 2022-01-01 PROCEDURE — 97530 THERAPEUTIC ACTIVITIES: CPT | Mod: CO

## 2022-01-01 PROCEDURE — 73130 X-RAY EXAM OF HAND: CPT | Mod: 26,LT,, | Performed by: RADIOLOGY

## 2022-01-01 PROCEDURE — 96372 THER/PROPH/DIAG INJ SC/IM: CPT | Performed by: PHYSICIAN ASSISTANT

## 2022-01-01 PROCEDURE — 99203 PR OFFICE/OUTPT VISIT, NEW, LEVL III, 30-44 MIN: ICD-10-PCS | Mod: S$GLB,,, | Performed by: PHYSICIAN ASSISTANT

## 2022-01-01 PROCEDURE — 99225 PR SUBSEQUENT OBSERVATION CARE,LEVEL II: ICD-10-PCS | Mod: ,,, | Performed by: ORTHOPAEDIC SURGERY

## 2022-01-01 PROCEDURE — 99226 PR SUBSEQUENT OBSERVATION CARE,LEVEL III: ICD-10-PCS | Mod: ,,, | Performed by: PHYSICIAN ASSISTANT

## 2022-01-01 PROCEDURE — 84100 ASSAY OF PHOSPHORUS: CPT | Performed by: PHYSICIAN ASSISTANT

## 2022-01-01 PROCEDURE — 99203 OFFICE O/P NEW LOW 30 MIN: CPT | Mod: S$GLB,,, | Performed by: PHYSICIAN ASSISTANT

## 2022-01-01 PROCEDURE — 73130 XR HAND COMPLETE 3 VIEW LEFT: ICD-10-PCS | Mod: 26,LT,, | Performed by: RADIOLOGY

## 2022-01-01 PROCEDURE — 99499 RISK ADDL DX/OHS AUDIT: ICD-10-PCS | Mod: S$GLB,,, | Performed by: PHYSICIAN ASSISTANT

## 2022-01-01 PROCEDURE — 99497 ADVNCD CARE PLAN 30 MIN: CPT | Mod: S$GLB,,, | Performed by: NURSE PRACTITIONER

## 2022-01-01 PROCEDURE — 99226 PR SUBSEQUENT OBSERVATION CARE,LEVEL III: CPT | Mod: ,,, | Performed by: PHYSICIAN ASSISTANT

## 2022-01-01 PROCEDURE — 99497 PR ADVNCD CARE PLAN 30 MIN: ICD-10-PCS | Mod: 25,,, | Performed by: PHYSICIAN ASSISTANT

## 2022-01-01 PROCEDURE — 1126F AMNT PAIN NOTED NONE PRSNT: CPT | Mod: CPTII,S$GLB,, | Performed by: PHYSICIAN ASSISTANT

## 2022-01-01 PROCEDURE — 82550 ASSAY OF CK (CPK): CPT | Performed by: EMERGENCY MEDICINE

## 2022-01-01 PROCEDURE — 1159F PR MEDICATION LIST DOCUMENTED IN MEDICAL RECORD: ICD-10-PCS | Mod: CPTII,S$GLB,, | Performed by: PHYSICIAN ASSISTANT

## 2022-01-01 PROCEDURE — 63600175 PHARM REV CODE 636 W HCPCS: Performed by: PHYSICIAN ASSISTANT

## 2022-01-01 PROCEDURE — 99497 PR ADVNCD CARE PLAN 30 MIN: ICD-10-PCS | Mod: S$GLB,,, | Performed by: NURSE PRACTITIONER

## 2022-01-01 PROCEDURE — 27000221 HC OXYGEN, UP TO 24 HOURS

## 2022-01-01 PROCEDURE — 25000003 PHARM REV CODE 250: Performed by: PHYSICIAN ASSISTANT

## 2022-01-01 PROCEDURE — 97530 THERAPEUTIC ACTIVITIES: CPT | Mod: CQ

## 2022-01-01 PROCEDURE — 80048 BASIC METABOLIC PNL TOTAL CA: CPT | Performed by: PHYSICIAN ASSISTANT

## 2022-01-01 PROCEDURE — 99999 PR PBB SHADOW E&M-EST. PATIENT-LVL III: ICD-10-PCS | Mod: PBBFAC,,, | Performed by: PHYSICIAN ASSISTANT

## 2022-01-01 PROCEDURE — 85025 COMPLETE CBC W/AUTO DIFF WBC: CPT | Performed by: EMERGENCY MEDICINE

## 2022-01-01 PROCEDURE — 96366 THER/PROPH/DIAG IV INF ADDON: CPT

## 2022-01-01 PROCEDURE — 96361 HYDRATE IV INFUSION ADD-ON: CPT

## 2022-01-01 PROCEDURE — 99220 PR INITIAL OBSERVATION CARE,LEVL III: ICD-10-PCS | Mod: ,,, | Performed by: PHYSICIAN ASSISTANT

## 2022-01-01 PROCEDURE — 1157F ADVNC CARE PLAN IN RCRD: CPT | Mod: CPTII,S$GLB,, | Performed by: PHYSICIAN ASSISTANT

## 2022-01-01 PROCEDURE — 1157F PR ADVANCE CARE PLAN OR EQUIV PRESENT IN MEDICAL RECORD: ICD-10-PCS | Mod: CPTII,S$GLB,, | Performed by: PHYSICIAN ASSISTANT

## 2022-01-01 PROCEDURE — 84484 ASSAY OF TROPONIN QUANT: CPT | Performed by: EMERGENCY MEDICINE

## 2022-01-01 PROCEDURE — 81001 URINALYSIS AUTO W/SCOPE: CPT | Performed by: EMERGENCY MEDICINE

## 2022-01-01 PROCEDURE — 99284 PR EMERGENCY DEPT VISIT,LEVEL IV: ICD-10-PCS | Mod: ,,, | Performed by: EMERGENCY MEDICINE

## 2022-01-01 PROCEDURE — 99217 PR OBSERVATION CARE DISCHARGE: CPT | Mod: ,,, | Performed by: PHYSICIAN ASSISTANT

## 2022-01-01 PROCEDURE — 1159F MED LIST DOCD IN RCRD: CPT | Mod: CPTII,S$GLB,, | Performed by: PHYSICIAN ASSISTANT

## 2022-01-01 PROCEDURE — 97112 NEUROMUSCULAR REEDUCATION: CPT

## 2022-01-01 PROCEDURE — 97161 PT EVAL LOW COMPLEX 20 MIN: CPT

## 2022-01-01 PROCEDURE — 99350 HOME/RES VST EST HIGH MDM 60: CPT | Mod: S$GLB,,, | Performed by: NURSE PRACTITIONER

## 2022-01-01 PROCEDURE — 73130 X-RAY EXAM OF HAND: CPT | Mod: TC,LT

## 2022-01-01 PROCEDURE — 3288F FALL RISK ASSESSMENT DOCD: CPT | Mod: CPTII,S$GLB,, | Performed by: PHYSICIAN ASSISTANT

## 2022-01-01 PROCEDURE — 97530 THERAPEUTIC ACTIVITIES: CPT

## 2022-01-01 PROCEDURE — 99217 PR OBSERVATION CARE DISCHARGE: ICD-10-PCS | Mod: ,,, | Performed by: PHYSICIAN ASSISTANT

## 2022-01-01 PROCEDURE — G0180 PR HOME HEALTH MD CERTIFICATION: ICD-10-PCS | Mod: ,,, | Performed by: NEUROLOGICAL SURGERY

## 2022-01-01 PROCEDURE — 96365 THER/PROPH/DIAG IV INF INIT: CPT

## 2022-01-01 PROCEDURE — 63600175 PHARM REV CODE 636 W HCPCS

## 2022-01-01 PROCEDURE — 99350 PR HOME VISIT,ESTAB PATIENT,LEVEL IV: ICD-10-PCS | Mod: S$GLB,,, | Performed by: NURSE PRACTITIONER

## 2022-01-01 PROCEDURE — 3288F PR FALLS RISK ASSESSMENT DOCUMENTED: ICD-10-PCS | Mod: CPTII,S$GLB,, | Performed by: PHYSICIAN ASSISTANT

## 2022-01-01 PROCEDURE — 1101F PR PT FALLS ASSESS DOC 0-1 FALLS W/OUT INJ PAST YR: ICD-10-PCS | Mod: CPTII,S$GLB,, | Performed by: PHYSICIAN ASSISTANT

## 2022-01-01 PROCEDURE — G0180 MD CERTIFICATION HHA PATIENT: HCPCS | Mod: ,,, | Performed by: INTERNAL MEDICINE

## 2022-01-01 RX ORDER — TALC
6 POWDER (GRAM) TOPICAL NIGHTLY PRN
Status: DISCONTINUED | OUTPATIENT
Start: 2022-01-01 | End: 2022-01-01 | Stop reason: HOSPADM

## 2022-01-01 RX ORDER — LORAZEPAM 0.5 MG/1
0.5 TABLET ORAL EVERY 8 HOURS PRN
Qty: 30 TABLET | Refills: 2 | Status: SHIPPED | OUTPATIENT
Start: 2022-01-01 | End: 2022-08-08

## 2022-01-01 RX ORDER — DIVALPROEX SODIUM 250 MG/1
250 TABLET, DELAYED RELEASE ORAL EVERY 8 HOURS PRN
Qty: 30 TABLET | Refills: 0 | Status: SHIPPED | OUTPATIENT
Start: 2022-01-01 | End: 2022-01-01 | Stop reason: SINTOL

## 2022-01-01 RX ORDER — AMLODIPINE BESYLATE 5 MG/1
5 TABLET ORAL DAILY
Status: DISCONTINUED | OUTPATIENT
Start: 2022-01-01 | End: 2022-01-01 | Stop reason: HOSPADM

## 2022-01-01 RX ORDER — BUSPIRONE HYDROCHLORIDE 10 MG/1
10 TABLET ORAL 2 TIMES DAILY
Qty: 60 TABLET | Refills: 11 | Status: SHIPPED | OUTPATIENT
Start: 2022-01-01 | End: 2023-04-19

## 2022-01-01 RX ORDER — MEMANTINE HYDROCHLORIDE 5 MG/1
10 TABLET ORAL 2 TIMES DAILY
Status: DISCONTINUED | OUTPATIENT
Start: 2022-01-01 | End: 2022-01-01 | Stop reason: HOSPADM

## 2022-01-01 RX ORDER — HYDROXYZINE HYDROCHLORIDE 25 MG/1
25 TABLET, FILM COATED ORAL 3 TIMES DAILY PRN
Status: DISCONTINUED | OUTPATIENT
Start: 2022-01-01 | End: 2022-01-01

## 2022-01-01 RX ORDER — HYDROXYZINE HYDROCHLORIDE 25 MG/1
25 TABLET, FILM COATED ORAL ONCE AS NEEDED
Status: COMPLETED | OUTPATIENT
Start: 2022-01-01 | End: 2022-01-01

## 2022-01-01 RX ORDER — OXYCODONE AND ACETAMINOPHEN 10; 325 MG/1; MG/1
1 TABLET ORAL EVERY 6 HOURS PRN
Status: DISCONTINUED | OUTPATIENT
Start: 2022-01-01 | End: 2022-01-01 | Stop reason: HOSPADM

## 2022-01-01 RX ORDER — LORAZEPAM 0.5 MG/1
0.5 TABLET ORAL EVERY 8 HOURS PRN
Qty: 30 TABLET | Refills: 0 | Status: SHIPPED | OUTPATIENT
Start: 2022-01-01 | End: 2022-01-01 | Stop reason: SDUPTHER

## 2022-01-01 RX ORDER — TRAZODONE HYDROCHLORIDE 100 MG/1
200 TABLET ORAL NIGHTLY
Status: DISCONTINUED | OUTPATIENT
Start: 2022-01-01 | End: 2022-01-01 | Stop reason: HOSPADM

## 2022-01-01 RX ORDER — AMOXICILLIN AND CLAVULANATE POTASSIUM 875; 125 MG/1; MG/1
1 TABLET, FILM COATED ORAL 2 TIMES DAILY
Qty: 10 TABLET | Refills: 0 | Status: SHIPPED | OUTPATIENT
Start: 2022-01-01 | End: 2022-01-01

## 2022-01-01 RX ORDER — ACETAMINOPHEN 500 MG
1000 TABLET ORAL
Status: COMPLETED | OUTPATIENT
Start: 2022-01-01 | End: 2022-01-01

## 2022-01-01 RX ORDER — IPRATROPIUM BROMIDE AND ALBUTEROL SULFATE 2.5; .5 MG/3ML; MG/3ML
3 SOLUTION RESPIRATORY (INHALATION) EVERY 4 HOURS PRN
Status: DISCONTINUED | OUTPATIENT
Start: 2022-01-01 | End: 2022-01-01 | Stop reason: HOSPADM

## 2022-01-01 RX ORDER — ENOXAPARIN SODIUM 100 MG/ML
40 INJECTION SUBCUTANEOUS EVERY 24 HOURS
Status: DISCONTINUED | OUTPATIENT
Start: 2022-01-01 | End: 2022-01-01 | Stop reason: HOSPADM

## 2022-01-01 RX ORDER — IBUPROFEN 200 MG
24 TABLET ORAL
Status: DISCONTINUED | OUTPATIENT
Start: 2022-01-01 | End: 2022-01-01 | Stop reason: HOSPADM

## 2022-01-01 RX ORDER — NALOXONE HCL 0.4 MG/ML
0.02 VIAL (ML) INJECTION
Status: DISCONTINUED | OUTPATIENT
Start: 2022-01-01 | End: 2022-01-01 | Stop reason: HOSPADM

## 2022-01-01 RX ORDER — QUETIAPINE FUMARATE 25 MG/1
25 TABLET, FILM COATED ORAL NIGHTLY
Qty: 30 TABLET | Refills: 1 | Status: SHIPPED | OUTPATIENT
Start: 2022-01-01 | End: 2022-01-01

## 2022-01-01 RX ORDER — MAG HYDROX/ALUMINUM HYD/SIMETH 200-200-20
30 SUSPENSION, ORAL (FINAL DOSE FORM) ORAL 4 TIMES DAILY PRN
Status: DISCONTINUED | OUTPATIENT
Start: 2022-01-01 | End: 2022-01-01 | Stop reason: HOSPADM

## 2022-01-01 RX ORDER — ONDANSETRON 8 MG/1
8 TABLET, ORALLY DISINTEGRATING ORAL EVERY 8 HOURS PRN
Status: DISCONTINUED | OUTPATIENT
Start: 2022-01-01 | End: 2022-01-01 | Stop reason: HOSPADM

## 2022-01-01 RX ORDER — TRAZODONE HYDROCHLORIDE 50 MG/1
50 TABLET ORAL
Status: COMPLETED | OUTPATIENT
Start: 2022-01-01 | End: 2022-01-01

## 2022-01-01 RX ORDER — SIMETHICONE 80 MG
1 TABLET,CHEWABLE ORAL 4 TIMES DAILY PRN
Status: DISCONTINUED | OUTPATIENT
Start: 2022-01-01 | End: 2022-01-01 | Stop reason: HOSPADM

## 2022-01-01 RX ORDER — ASPIRIN 81 MG/1
81 TABLET ORAL DAILY
Status: DISCONTINUED | OUTPATIENT
Start: 2022-01-01 | End: 2022-01-01 | Stop reason: HOSPADM

## 2022-01-01 RX ORDER — SODIUM CHLORIDE 0.9 % (FLUSH) 0.9 %
10 SYRINGE (ML) INJECTION EVERY 8 HOURS PRN
Status: DISCONTINUED | OUTPATIENT
Start: 2022-01-01 | End: 2022-01-01 | Stop reason: HOSPADM

## 2022-01-01 RX ORDER — HYDROCODONE BITARTRATE AND ACETAMINOPHEN 5; 325 MG/1; MG/1
1 TABLET ORAL NIGHTLY PRN
Qty: 10 TABLET | Refills: 0 | Status: SHIPPED | OUTPATIENT
Start: 2022-01-01

## 2022-01-01 RX ORDER — PROCHLORPERAZINE EDISYLATE 5 MG/ML
5 INJECTION INTRAMUSCULAR; INTRAVENOUS EVERY 6 HOURS PRN
Status: DISCONTINUED | OUTPATIENT
Start: 2022-01-01 | End: 2022-01-01 | Stop reason: HOSPADM

## 2022-01-01 RX ORDER — DOXEPIN HYDROCHLORIDE 50 MG/1
50 CAPSULE ORAL NIGHTLY
Qty: 30 CAPSULE | Refills: 11 | Status: SHIPPED | OUTPATIENT
Start: 2022-01-01 | End: 2023-05-10

## 2022-01-01 RX ORDER — POLYETHYLENE GLYCOL 3350 17 G/17G
17 POWDER, FOR SOLUTION ORAL DAILY PRN
Status: DISCONTINUED | OUTPATIENT
Start: 2022-01-01 | End: 2022-01-01 | Stop reason: HOSPADM

## 2022-01-01 RX ORDER — DOXEPIN HYDROCHLORIDE 25 MG/1
25 CAPSULE ORAL NIGHTLY
Qty: 30 CAPSULE | Refills: 11 | Status: SHIPPED | OUTPATIENT
Start: 2022-01-01 | End: 2022-01-01

## 2022-01-01 RX ORDER — ACETAMINOPHEN 325 MG/1
650 TABLET ORAL EVERY 4 HOURS PRN
Status: DISCONTINUED | OUTPATIENT
Start: 2022-01-01 | End: 2022-01-01 | Stop reason: HOSPADM

## 2022-01-01 RX ORDER — OXYCODONE AND ACETAMINOPHEN 5; 325 MG/1; MG/1
1 TABLET ORAL EVERY 4 HOURS PRN
Status: DISCONTINUED | OUTPATIENT
Start: 2022-01-01 | End: 2022-01-01 | Stop reason: HOSPADM

## 2022-01-01 RX ORDER — GLUCAGON 1 MG
1 KIT INJECTION
Status: DISCONTINUED | OUTPATIENT
Start: 2022-01-01 | End: 2022-01-01 | Stop reason: HOSPADM

## 2022-01-01 RX ORDER — IBUPROFEN 200 MG
16 TABLET ORAL
Status: DISCONTINUED | OUTPATIENT
Start: 2022-01-01 | End: 2022-01-01 | Stop reason: HOSPADM

## 2022-01-01 RX ORDER — HYDROCODONE BITARTRATE AND ACETAMINOPHEN 5; 325 MG/1; MG/1
1 TABLET ORAL EVERY 8 HOURS PRN
Qty: 9 TABLET | Refills: 0 | Status: SHIPPED | OUTPATIENT
Start: 2022-01-01 | End: 2022-01-01 | Stop reason: SDUPTHER

## 2022-01-01 RX ORDER — DONEPEZIL HYDROCHLORIDE 5 MG/1
10 TABLET, FILM COATED ORAL NIGHTLY
Status: DISCONTINUED | OUTPATIENT
Start: 2022-01-01 | End: 2022-01-01 | Stop reason: HOSPADM

## 2022-01-01 RX ADMIN — ASPIRIN 81 MG: 81 TABLET, COATED ORAL at 10:04

## 2022-01-01 RX ADMIN — OXYCODONE HYDROCHLORIDE AND ACETAMINOPHEN 1 TABLET: 5; 325 TABLET ORAL at 06:04

## 2022-01-01 RX ADMIN — OXYCODONE HYDROCHLORIDE AND ACETAMINOPHEN 1 TABLET: 10; 325 TABLET ORAL at 12:04

## 2022-01-01 RX ADMIN — THERA TABS 1 TABLET: TAB at 08:04

## 2022-01-01 RX ADMIN — TRAZODONE HYDROCHLORIDE 200 MG: 100 TABLET ORAL at 08:04

## 2022-01-01 RX ADMIN — ENOXAPARIN SODIUM 40 MG: 100 INJECTION SUBCUTANEOUS at 04:04

## 2022-01-01 RX ADMIN — CEFTRIAXONE 1 G: 1 INJECTION, SOLUTION INTRAVENOUS at 10:04

## 2022-01-01 RX ADMIN — OXYCODONE HYDROCHLORIDE AND ACETAMINOPHEN 1 TABLET: 10; 325 TABLET ORAL at 10:04

## 2022-01-01 RX ADMIN — MEMANTINE HYDROCHLORIDE 10 MG: 5 TABLET ORAL at 08:04

## 2022-01-01 RX ADMIN — ASPIRIN 81 MG: 81 TABLET, COATED ORAL at 08:04

## 2022-01-01 RX ADMIN — POLYETHYLENE GLYCOL 3350 17 G: 17 POWDER, FOR SOLUTION ORAL at 10:04

## 2022-01-01 RX ADMIN — MEMANTINE HYDROCHLORIDE 10 MG: 5 TABLET ORAL at 09:04

## 2022-01-01 RX ADMIN — AMLODIPINE BESYLATE 5 MG: 5 TABLET ORAL at 10:04

## 2022-01-01 RX ADMIN — TRAZODONE HYDROCHLORIDE 200 MG: 100 TABLET ORAL at 09:04

## 2022-01-01 RX ADMIN — TRAZODONE HYDROCHLORIDE 50 MG: 50 TABLET ORAL at 03:04

## 2022-01-01 RX ADMIN — OXYCODONE HYDROCHLORIDE AND ACETAMINOPHEN 1 TABLET: 10; 325 TABLET ORAL at 08:04

## 2022-01-01 RX ADMIN — ENOXAPARIN SODIUM 40 MG: 100 INJECTION SUBCUTANEOUS at 05:04

## 2022-01-01 RX ADMIN — OXYCODONE HYDROCHLORIDE AND ACETAMINOPHEN 1 TABLET: 10; 325 TABLET ORAL at 05:04

## 2022-01-01 RX ADMIN — MEMANTINE HYDROCHLORIDE 10 MG: 5 TABLET ORAL at 10:04

## 2022-01-01 RX ADMIN — THERA TABS 1 TABLET: TAB at 10:04

## 2022-01-01 RX ADMIN — AMLODIPINE BESYLATE 5 MG: 5 TABLET ORAL at 06:04

## 2022-01-01 RX ADMIN — CEFTRIAXONE 1 G: 1 INJECTION, SOLUTION INTRAVENOUS at 08:04

## 2022-01-01 RX ADMIN — SODIUM CHLORIDE 500 ML: 0.9 INJECTION, SOLUTION INTRAVENOUS at 03:04

## 2022-01-01 RX ADMIN — ENOXAPARIN SODIUM 40 MG: 100 INJECTION SUBCUTANEOUS at 06:04

## 2022-01-01 RX ADMIN — DONEPEZIL HYDROCHLORIDE 10 MG: 5 TABLET, FILM COATED ORAL at 08:04

## 2022-01-01 RX ADMIN — POLYETHYLENE GLYCOL 3350 17 G: 17 POWDER, FOR SOLUTION ORAL at 11:04

## 2022-01-01 RX ADMIN — Medication 6 MG: at 09:04

## 2022-01-01 RX ADMIN — SODIUM CHLORIDE 500 ML: 9 INJECTION, SOLUTION INTRAVENOUS at 03:04

## 2022-01-01 RX ADMIN — ACETAMINOPHEN 650 MG: 325 TABLET ORAL at 04:04

## 2022-01-01 RX ADMIN — ACETAMINOPHEN 1000 MG: 500 TABLET ORAL at 11:04

## 2022-01-01 RX ADMIN — OXYCODONE HYDROCHLORIDE AND ACETAMINOPHEN 1 TABLET: 10; 325 TABLET ORAL at 03:04

## 2022-01-01 RX ADMIN — TRAZODONE HYDROCHLORIDE 150 MG: 50 TABLET ORAL at 08:04

## 2022-01-01 RX ADMIN — OXYCODONE HYDROCHLORIDE AND ACETAMINOPHEN 1 TABLET: 10; 325 TABLET ORAL at 07:04

## 2022-01-01 RX ADMIN — DONEPEZIL HYDROCHLORIDE 10 MG: 5 TABLET, FILM COATED ORAL at 09:04

## 2022-01-01 RX ADMIN — HYDROXYZINE HYDROCHLORIDE 25 MG: 25 TABLET ORAL at 12:04

## 2022-01-01 RX ADMIN — HYDROXYZINE HYDROCHLORIDE 25 MG: 25 TABLET ORAL at 02:04

## 2022-01-01 RX ADMIN — HYDROXYZINE HYDROCHLORIDE 25 MG: 25 TABLET ORAL at 06:04

## 2022-01-06 NOTE — TELEPHONE ENCOUNTER
----- Message from Marsha Meredith sent at 1/6/2022 11:28 AM CST -----  Contact: Daughter (Alexsander Rodriguez)[phone]  Patient would like to get medical advice.    Would you like a call back, or a response through your [facility] portal?:   call back    Comments:   Calling to speak with the nurse regarding having a order placed for a physical therapist. Daughter states when pt walk with cane pt is not steady.

## 2022-01-06 NOTE — TELEPHONE ENCOUNTER
Spoke with pts daughter, she would like PT to come out and see pt like before. Per encounter on 12/15  request to see pt before she can order HH.     pts daughter requesting for soon appt. Can you help schedule pt around 11am. Afternoon times does not work for pts transportation. Thank you

## 2022-01-14 NOTE — TELEPHONE ENCOUNTER
----- Message from Cheri León sent at 1/14/2022 12:33 PM CST -----  Contact: Bree/Daughter [phone]  Requesting Orders    Orders being requested: Home Health/PT/nurse    Reason for request: trouble walking    Please send to [facility]. If appt is needed, daughter is requesting a virtual.    Thank You

## 2022-04-09 PROBLEM — R53.1 GENERALIZED WEAKNESS: Status: ACTIVE | Noted: 2022-01-01

## 2022-04-09 PROBLEM — S62.337A CLOSED DISPLACED FRACTURE OF NECK OF FIFTH METACARPAL BONE OF LEFT HAND: Status: ACTIVE | Noted: 2022-01-01

## 2022-04-09 NOTE — PROGRESS NOTES
[facility] - Telemetry Stepdown ([address] Medicine  Progress Note    Patient Name: Lang Kumar  MRN: [id number]  Patient Class: OP- Observation   Admission Date: 4/8/2022  Length of Stay: 0 days  Attending Physician: Brandt Barkley MD  Primary Care Provider: Ursula Connelly MD        Subjective:     Principal Problem:Generalized weakness        HPI:  Lang Kumar is a 89 y.o. female with a PMHx of dementia and previous CVA who was brought to the ED by daughter for generalized weakness and inability to ambulate. Patient's daughter present at bedside to assist in history. The patient was in her usually state of health until today when her daughter noticed she was having trouble ambulating. Typically she ambulates independently to and from the restroom, but today she was struggling and had to hold onto the wall to maintain her balance. Daughter also noticed bruising and injury to her left hand. Patient was able to recall falling in her bedroom. Ms. Kumar is at her baseline mental status, oriented to self and family, with no worsening confusion. Patient has a history of UTIs and endorses dysuria. No other complaints.     ED: VSSAF. Labs without acute abnormality. CT hip and cervical spine without acute fracture. CT head without acute hemorrhage or infarct. XR L hand with acute mildly displaced fracture of the 5th metacarpal neck. Pt placed in splint.       Overview/Hospital Course:  Patient admitted to observation for generalized weakness. UA unable to be obtained due to pure wick and incontinence. Bladder scan <100, will give fluids and plan for a straight cath to obtain UA. PT/OT consulted and recommend HH. Ortho consulted for acute fracture of 5th metacarpal neck who re-splinted in well-padded ulnar gutter splint. Report no surgical intervention necessary at this time and to follow up at the ortho clinic.      Interval History: Patient seen and examined today with daughter at bedside. She  "reports that over the past several days the patient has become increasingly weak and has been "shaking."  Daughter reports the she has been eating and drinking as normal at home. UA unable to be obtained to incorrect Purewick placement and incontinence. Bladder scan with <100 cc, will order 500 mL NS bolus and straight cath in order to obtain UA. Ortho consulted for left 5th metacarpal fracture, no surgical intervention needed at this time. Placed in ulnar gutter splint and plan for follow-up outpatient. PT/OT recommend HH and family is agreeable to this plan.    Review of Systems   Unable to perform ROS: Dementia   Genitourinary:  Positive for dysuria.   Neurological:  Positive for weakness.   Psychiatric/Behavioral:  Negative for confusion.    Objective:     Vital Signs (Most Recent):  Temp: 98 °F (36.7 °C) (04/09/22 1134)  Pulse: 66 (04/09/22 1134)  Resp: 18 (04/09/22 1216)  BP: (!) 169/85 (04/09/22 1134)  SpO2: (!) 90 % (04/09/22 1134)   Vital Signs (24h Range):  Temp:  [98 °F (36.7 °C)-98.7 °F (37.1 °C)] 98 °F (36.7 °C)  Pulse:  [66-81] 66  Resp:  [15-18] 18  SpO2:  [90 %-100 %] 90 %  BP: (123-180)/(63-85) 169/85     Weight: 50.5 kg (111 lb 5.3 oz)  Body mass index is 19.73 kg/m².  No intake or output data in the 24 hours ending 04/09/22 1429   Physical Exam  Vitals and nursing note reviewed.   Constitutional:       General: She is not in acute distress.     Appearance: She is well-developed.   HENT:      Head: Normocephalic and atraumatic.      Mouth/Throat:      Mouth: Mucous membranes are dry.   Eyes:      Conjunctiva/sclera: Conjunctivae normal.      Pupils: Pupils are equal, round, and reactive to light.   Cardiovascular:      Rate and Rhythm: Normal rate and regular rhythm.      Heart sounds: Normal heart sounds.   Pulmonary:      Effort: Pulmonary effort is normal. No respiratory distress.      Breath sounds: Normal breath sounds. No wheezing.   Abdominal:      General: Bowel sounds are normal. There is " no distension.      Palpations: Abdomen is soft.      Tenderness: There is no abdominal tenderness.   Musculoskeletal:         General: Signs of injury (left hand) present. Normal range of motion.      Cervical back: Normal range of motion and neck supple.      Comments: Mild bruising noted to left hand, wrapped in dressing with splint in place   Skin:     General: Skin is warm and dry.      Capillary Refill: Capillary refill takes less than 2 seconds.      Findings: No rash.   Neurological:      General: No focal deficit present.      Mental Status: She is alert. Mental status is at baseline.      Cranial Nerves: No cranial nerve deficit.      Comments: Awake, alert, oriented to self and daughter at bedside (baseline). Following commands. Strength and sensation intact.    Psychiatric:         Behavior: Behavior normal.         Thought Content: Thought content normal.         Judgment: Judgment normal.       Significant Labs: All pertinent labs within the past 24 hours have been reviewed.  BMP:   Recent Labs   Lab 04/09/22  0606   GLU 99      K 3.8      CO2 25   BUN 19   CREATININE 0.7   CALCIUM 9.1   MG 1.9     CBC:   Recent Labs   Lab 04/08/22 2053 04/09/22  0606   WBC 14.27* 10.18   HGB 12.4 12.2   HCT 38.0 37.2    218       Significant Imaging: I have reviewed all pertinent imaging results/findings within the past 24 hours.      Assessment/Plan:      * Generalized weakness  Fall    Patient fell and was too weak to ambulate without holding onto the wall today. Patient typically ambulates independently. Has history of UTIs and follows with urology.    - VSSAF, labs without acute abormalities  - appears dry on exam  - suspect mechanical fall 2/2 weakness from dehydration and possible UTI  - CT cervical spine, CT head without acute process  - patient is incontinent so difficult to obtain UA in ED, f/u results  - PT/OT recommend HH  - fall precautions    Major neurocognitive disorder due to  multiple etiologies  Dementia    - baseline mental status patient is oriented to self and family, has sitters at home and lives with son  - continue aricept nightly, namenda BID  - trazodone 150 mg nightly at 2000  - delirium precautions    Closed displaced fracture of neck of fifth metacarpal bone of left hand  - XR with acute mildly displaced fracture of the left 5th metacarpal neck with adjacent soft tissue edema  - placed in splint in ED  - ortho consulted, placed an ulnar gutter splint and plan for outpatient follow-up    Insomnia  - continue trazodone nightly    Stroke  - continue ASA    Coronary artery disease involving native coronary artery of native heart without angina pectoris  - continue ASA  - not on statin      VTE Risk Mitigation (From admission, onward)         Ordered     enoxaparin injection 40 mg  Daily         04/09/22 0225     IP VTE HIGH RISK PATIENT  Once         04/09/22 0225     Place sequential compression device  Until discontinued         04/09/22 0225                Discharge Planning   MIGUEL ÁNGEL: 4/12/2022     Code Status: Full Code   Is the patient medically ready for discharge?: No    Reason for patient still in hospital (select all that apply): Patient trending condition, Laboratory test and Treatment           Discussed patient's plan of care with WILLI MaxwellC  Department of Hospital Medicine   [facility] - Telemetry Stepdown ([place]-[facility])

## 2022-04-09 NOTE — ASSESSMENT & PLAN NOTE
Fall    Patient fell and was too weak to ambulate without holding onto the wall today. Patient typically ambulates independently. Has history of UTIs and follows with urology.    - VSSAF, labs without acute abormalities  - appears dry on exam  - suspect mechanical fall 2/2 weakness from dehydration and possible UTI  - CT cervical spine, CT head without acute process  - patient is incontinent so difficult to obtain UA in ED, f/u results  - PT/OT recommend HH  - fall precautions

## 2022-04-09 NOTE — ASSESSMENT & PLAN NOTE
Lang Kumar is a 89 y.o. female admitted for workup of weakness and therapy evaluation. Ortho consulted for left hand pain and XR findings of angulated small finger metacarpal neck fracture after fall at home. No other injuries reported after her fall and no pain elsewhere on exam.     - Re-splinted in ulnar gutter splint  - Encouraged patient to remain NWB and to leave splint intact. Overlying ACE bandage was taped. Daughter reports patient is at high risk to remove splint due to dementia   - If continues to remove splint will plan for removable ulnar gutter brace to offer immobization  - Encouraged strict elevation to prevent swelling in fingers   - Plan for hand clinic follow up upon discharge for continued observation (patient's daughter will be contacted for details)

## 2022-04-09 NOTE — HPI
Lang Kumar is a 89 y.o. female with PMH advanced dementia, osteoporosis, CAD, CVA, HL, and urinary incontinence admitted for generalized weakness and debility after fall at home. Ortho consulted for left hand 5th metacarpal neck fracture management recs. Patient was seen in the ED where daughter endorsed history of fall onto left hand 2/2 generalized weakness over the past few days. Since then patient has had left hand swelling and bruising with reports of pain. XR in ED showed mildly angulated 5th metacarpal neck fracture. She was placed in ulnar gutter splint and admitted to hospital medicine for workup of weakness and PT evaluation. On evaluated at bedside today patient had removed ulnar gutter splint 2/2 dementia. Daughter at bedside for collateral history. Patient endorses minimal pain. No open wounds on left hand. Denies any paresthesias to left hand. RHD.

## 2022-04-09 NOTE — ASSESSMENT & PLAN NOTE
Dementia    - baseline mental status patient is oriented to self and family, has sitters at home and lives with son  - continue aricept nightly, namenda BID  - trazodone 150 mg nightly  - delirium precautions

## 2022-04-09 NOTE — PT/OT/SLP EVAL
Physical Therapy Co Evaluation and Tx    Patient Name:  Lang Kumar   MRN:  [id number]  Admit Date: 4/8/2022  Admitting Diagnosis:  Generalized weakness  Length of Stay: 0 days  Recent Surgery: * No surgery found *    *Co treatment of 2 skilled therapists indicated in order to maximize function and safety of Pt.  Recommendations:   Discharge Recommendations:  home health OT   Discharge Equipment Recommendations: wheelchair   Barriers to discharge: requires increased assistance      Assessment:     Lang Kumar is a 89 y.o. female admitted with a medical diagnosis of Generalized weakness.  Pt presents with functional mobility deficits and is functioning below baseline. Presents s/p fall on 4/7 where she suffered a fractured 5th metacarpal on L hand. Pt agreeable to evaluation, but performance limited 2/2 c/o pain when sitting EOB and fearful of falling. Patient able to take side steps along EOB with min assist and required assist of 1 for sitting balance. Pt will continue to benefit from acute PT services in order to maximize safety and (I) with functional mobility.    Problem List: weakness, impaired endurance, impaired self care skills, impaired functional mobilty, gait instability, impaired balance, impaired cognition, decreased coordination, decreased upper extremity function, decreased lower extremity function, decreased safety awareness, pain  Rehab Prognosis: Good; patient would benefit from acute skilled PT services to address these deficits and reach maximum level of function.        Plan:   During this hospitalization, patient to be seen 3 x/week to address the above listed problems via gait training, therapeutic activities, therapeutic exercises, neuromuscular re-education  · Plan of Care Expires:  05/07/22    Subjective     Chief Complaint: Altered Mental Status (Hx of Dementia. Per pt's daughters, they noticed progressive declining over the course of today stating pt has been tremulous and unable  to ambulate w/o assistance. Unwitnessed fall yesterday, 4/7/22 - bruising noted to left hand.)    Patient/Family Comments/goals: to go home  Pain/Comfort:  · Pain Rating 1: other (see comments) (no pain at rest but reported pain all over with movement)  · Pain Addressed 1: Reposition, Distraction, Cessation of Activity  · Pain Rating Post-Intervention 1:  (no pain at rest but reported pain all over with movement)    Social History:  Residence: Patient's son lives with her in a [facility] with threshold to enter  Support available: sitters and 3 adult children  Equipment Used: cane, straight, walker, rolling, shower chair, bedside commode  Prior level of function: Patient ambulates with straight cane at baseline    Objective:     Communicated with RN prior to session.  Patient found HOB elevated upon PT entry to room.   Additional staff present: OT    General Precautions: Standard, fall   Orthopedic Precautions:N/A   Braces:  (LUE splint)   Oxygen Device: Room Air    Exams:  · Cognition:   · AxOx2  · Command following: Follows one-step verbal commands    · Postural Exam:  Patient presented with the following abnormalities:    · -       Rounded shoulders  · -       Forward head  · Sensation: unable to formally assess    · RLE ROM: WFL  · RLE Strength: WFL  · LLE ROM: WFL  · LLE Strength: WFL    Functional Mobility:  Bed Mobility:     Supine to Sit: maximal assistance  Sit to Supine: moderate assistance  Transfers:     Sit to Stand:  minimum assistance with hand-held assist  Gait:   · Distance: ~4-5 R lateral steps to HOB  · Level of Assistance:  minimum assistance  · AD used: hand-held assist  · Gait Deviations: decreased speed, step length, swing through, heel strike, forward flexed posture, and downward gaze.   · Difficulty weight shifting and advancing limbs; Pt pivoting feet in R lateral direction  · Comments: PT providing verbal and tactile cues to Pts trunk and hips to increase core and gluteal musculature recruitment  and improve gait mechanics/posture  Balance:   · Static Sitting: Max progessing to CGA  · Dynamic Sitting: Tommie   · Static Standing: Tommie  · Dynamic Standing: Tommie    Therapeutic Activities & Exercises:      Role of PT in POC   Patient educated on the importance of early mobility to prevent functional decline during hospital stay   Patient was instructed to utilize staff assistance for mobility/transfers.   Patient was educated on PT POC and all questions answered within PT scope of practice.   Patient able to verbalize understanding; will follow-up with pt during current admit for additional questions/concerns within scope of practice.     Outcome Measures:  AM-PAC 6 CLICK MOBILITY  Turning over in bed (including adjusting bedclothes, sheets and blankets)?: 3  Sitting down on and standing up from a chair with arms (e.g., wheelchair, bedside commode, etc.): 3  Moving from lying on back to sitting on the side of the bed?: 2  Moving to and from a bed to a chair (including a wheelchair)?: 2  Need to walk in hospital room?: 3  Climbing 3-5 steps with a railing?: 1  Basic Mobility Total Score: 14     Patient left supine with all lines intact, call button in reach, bed alarm on and PA and daughter present.    GOALS:   Multidisciplinary Problems     Physical Therapy Goals        Problem: Physical Therapy    Goal Priority Disciplines Outcome Goal Variances Interventions   Physical Therapy Goal     PT, PT/OT Ongoing, Progressing     Description: Goals to be met by: 22     Patient will increase functional independence with mobility by performin. Supine to sit with MInimal Assistance  2. Sit to supine with MInimal Assistance  3. Sit to stand transfer with Contact Guard Assistance  4. Gait  x 20 feet with Minimal Assistance using No Assistive Device or hand held assist.  5. Lower extremity exercise program x 20 reps per handout, with assistance as needed                       History:     Past Medical History:    Diagnosis Date    Abnormal exam or test finding 2011    coronary calcium score 204 = moderate plaque burden and high coronary heart disease risk    Anxiety 10/7/2021    Arthritis     Atherosclerosis of aortic arch     - noted on CXR 2016    Borderline hyperlipidemia     Coronary artery disease involving native coronary artery of native heart without angina pectoris 2016    Insomnia 10/7/2021    Major neurocognitive disorder due to multiple etiologies     Osteoporosis, post-menopausal     Stroke 2020    Urge and stress incontinence     Vitamin D deficiency disease        Past Surgical History:   Procedure Laterality Date    CATARACT EXTRACTION, BILATERAL      COSMETIC SURGERY      [place] Forte Colpocleisis  2014    perineorrhaphy and posterior colporhaphy done concommitantly    OPEN REDUCTION AND INTERNAL FIXATION (ORIF) OF FRACTURE OF DISTAL HUMERUS Left 2018    Procedure: ORIF, FRACTURE, HUMERUS, DISTAL, LEFT;  Surgeon: Sonu Carmona MD;  Location: [facility] OR [address] [place] [address];  Service: Orthopedics;  Laterality: Left;       Family History   Problem Relation Age of Onset    Lung cancer Sister         smoker    Coronary artery disease Father     Stroke Mother     Fibromyalgia Sister     Arthritis Sister         back and knee     Breast cancer Neg Hx     Ovarian cancer Neg Hx     Diabetes Neg Hx     Colon cancer Neg Hx        Social History     Socioeconomic History    Marital status:     Number of children: 3   Occupational History    Occupation: teacher - retired   Tobacco Use    Smoking status: Former Smoker     Packs/day: 0.50     Types: Cigarettes     Quit date: 1970     Years since quittin.9    Smokeless tobacco: Never Used   Substance and Sexual Activity    Alcohol use: Not Currently     Comment: once a week    Drug use: No    Sexual activity: Never     Time Tracking:     PT Received On: 22  PT Start Time: 1046     PT Stop Time: 1110  PT  Total Time (min): 24 min     Billable Minutes: Evaluation 10 and Neuromuscular Re-education 14    4/9/2022

## 2022-04-09 NOTE — ED PROVIDER NOTES
Encounter Date: 4/8/2022       History     Chief Complaint   Patient presents with    Altered Mental Status     Hx of Dementia. Per pt's daughters, they noticed progressive declining over the course of today stating pt has been tremulous and unable to ambulate w/o assistance. Unwitnessed fall yesterday, 4/7/22 - bruising noted to left hand.     HPI     89 yo female, w/h/o Alzheimers v vascular dementia (donepezil, memantine), stroke, presents for AMS. Chart review shows pt last neurology visit 9/30/21 with Dr. Hoff. Pt's dtrs with her at bedside. They report unwitnessed fall yesterday, generalized weakness that is worsening over the last day so much so that she cannot walk. She normally ambulates with a cane without difficulty. Pt's dtrs say she normally is dehydrated or has a UTI when acting like this. They deny her reporting HA, CP, SOB, cough, abdominal pain, emesis, change in urine or bm. They also specifically deny any change in mental status in contrast to the chief concern listed above. State she was not on ground for prolonged period of time, because by the time the son (who lives with her) found her she was not on the ground.         Review of patient's allergies indicates:  No Known Allergies  Past Medical History:   Diagnosis Date    Abnormal exam or test finding 12/2011    coronary calcium score 204 = moderate plaque burden and high coronary heart disease risk    Anxiety 10/7/2021    Arthritis     Atherosclerosis of aortic arch     - noted on CXR 11/2016    Borderline hyperlipidemia     Coronary artery disease involving native coronary artery of native heart without angina pectoris 11/30/2016    Insomnia 10/7/2021    Major neurocognitive disorder due to multiple etiologies     Osteoporosis, post-menopausal     Stroke 9/7/2020    Urge and stress incontinence     Vitamin D deficiency disease      Past Surgical History:   Procedure Laterality Date    CATARACT EXTRACTION, BILATERAL       COSMETIC SURGERY      Le Forte Colpocleisis  2014    perineorrhaphy and posterior colporhaphy done concommitantly    OPEN REDUCTION AND INTERNAL FIXATION (ORIF) OF FRACTURE OF DISTAL HUMERUS Left 2018    Procedure: ORIF, FRACTURE, HUMERUS, DISTAL, LEFT;  Surgeon: Sonu Carmona MD;  Location: [facility] OR [address] [place] [address];  Service: Orthopedics;  Laterality: Left;     Family History   Problem Relation Age of Onset    Lung cancer Sister         smoker    Coronary artery disease Father     Stroke Mother     Fibromyalgia Sister     Arthritis Sister         back and knee     Breast cancer Neg Hx     Ovarian cancer Neg Hx     Diabetes Neg Hx     Colon cancer Neg Hx      Social History     Tobacco Use    Smoking status: Former Smoker     Packs/day: 0.50     Types: Cigarettes     Quit date: 1970     Years since quittin.9    Smokeless tobacco: Never Used   Substance Use Topics    Alcohol use: Not Currently     Comment: once a week    Drug use: No     Review of Systems     General: No fever.  No chills.  Eyes: No visual changes.  Head: No headache.    Integument: No rashes or lesions.  Chest: No shortness of breath.  Cardiovascular: No chest pain.  Abdomen: No abdominal pain.  No nausea or vomiting.  Urinary: No abnormal urination.  Neurologic: No focal weakness.  No numbness.  Hematologic: No easy bruising. +ecchymosis and hematoma   Endocrine: No excessive thirst or urination.      Physical Exam     Initial Vitals [22]   BP Pulse Resp Temp SpO2   (!) 142/70 70 18 98.7 °F (37.1 °C) (!) 94 %      MAP       --         Physical Exam    Appearance: No acute distress.  HEENT: Normocephalic. Atraumatic. No conjunctival injection. EOMI. PERRL. Oropharynx clear.    Neck: No JVD. No LN. Neck supple.    Chest: Non-tender. Clear to auscultation bilaterally.  Good air movement.  No wheezes.  No rhonchi.  Cardiovascular: Regular rate and rhythm. No murmurs. No gallops. No rubs. +2 radial/DP/DT pulses  bilaterally.  Abdomen: Soft. Not distended. Nontender. No guarding. No rebound. No Masses  Musculoskeletal: Good range of motion all joints. No deformities. +ecchymosis and hematoma to left medial hand over 5th metacarpal. No underlying bony ttp. FDS, FDP, extensor full strnegth. NVI. No c-t-l spine ttp or step-offs. No pelvic instability    Neurologic: Alert.  Equal strength in upper and lower extremities bilaterally. Normal sensation. No facial droop. Normal speech. No meningismus. When attempts to stand, requires two assist and shakes with pain.  Psych:  Appropriate, conversant.    Integumentary: No rashes seen.  Good turgor.  No abrasions.  No ecchymoses.        ED Course   Procedures  Labs Reviewed   CBC W/ AUTO DIFFERENTIAL - Abnormal; Notable for the following components:       Result Value    WBC 14.27 (*)     RBC 3.92 (*)     MCH 31.6 (*)     Immature Granulocytes 0.9 (*)     Gran # (ANC) 12.0 (*)     Immature Grans (Abs) 0.13 (*)     Lymph # 0.6 (*)     Mono # 1.4 (*)     Gran % 84.3 (*)     Lymph % 4.4 (*)     All other components within normal limits   COMPREHENSIVE METABOLIC PANEL - Abnormal; Notable for the following components:    Glucose 115 (*)     All other components within normal limits   CK - Abnormal; Notable for the following components:     (*)     All other components within normal limits   INFLUENZA A & B BY MOLECULAR   TROPONIN I   TSH   URINALYSIS, REFLEX TO URINE CULTURE          Imaging Results    None          Medications - No data to display                       Clinical Impression:   Final diagnoses:  [W19.XXXA] Fall         89 yo female, w/h/o Alzheimers v vascular dementia (donepezil, memantine), stroke, presents for AMS. VSS, afeb. +ecchymosis and hematoma to left medial hand over 5th metacarpal. No underlying bony ttp. FDS, FDP, extensor full strnegth. NVI. No c-t-l spine ttp or step-offs. No pelvic instability. When attempts to stand, requires two assist and shakes with  pain. Neuro exam nonfocal. No meningismus. CXR, CTH, CT C spine, [facility]           Jacklyn Sarah MD  04/08/22 2061

## 2022-04-09 NOTE — HOSPITAL COURSE
Patient admitted to observation for fracture of 5th metacarpal. PT/OT consulted and recommend HH. Ortho consulted for acute fracture of 5th metacarpal neck who re-splinted in well-padded ulnar gutter splint. Report no surgical intervention necessary at this time and to follow up at the ortho clinic.UA unable to be obtained due to pure wick and incontinence. Bladder scan <100, will give fluids and plan for a straight cath to obtain UA. UA nitrate-positive for UTI, started on ceftriaxone 1g q24 hours. Did not reflex to culture, will not continue abx.    Discussed goals of care with patient's daughter, Bharti. She is interested in hospice given her mother's dementia and decline. She states her mother is a DNR and would like to meet with hospice representative, case management notified. Hospice met with patient, do not think she is a candidate at this time but will order [facility]H with palliative, neuropsych referral, [facility]. Stable for dc with ortho fu, NWB LUE.No further questios

## 2022-04-09 NOTE — PLAN OF CARE
Plan of care reviewed with patient and daughter, verbalized understanding.    Problem: Adult Inpatient Plan of Care  Goal: Plan of Care Review  Outcome: Ongoing, Progressing  Flowsheets (Taken 4/9/2022 8476)  Plan of Care Reviewed With:   patient   daughter  Goal: Absence of Hospital-Acquired Illness or Injury  Outcome: Ongoing, Progressing  Goal: Optimal Comfort and Wellbeing  Outcome: Ongoing, Progressing  Goal: Readiness for Transition of Care  Outcome: Ongoing, Progressing     Problem: Infection  Goal: Absence of Infection Signs and Symptoms  Outcome: Ongoing, Progressing     Problem: Fall Injury Risk  Goal: Absence of Fall and Fall-Related Injury  Outcome: Ongoing, Progressing     Problem: Restraint, Nonbehavioral (Nonviolent)  Goal: Absence of Harm or Injury  Outcome: Ongoing, Progressing     Problem: Skin Injury Risk Increased  Goal: Skin Health and Integrity  Outcome: Ongoing, Progressing

## 2022-04-09 NOTE — ASSESSMENT & PLAN NOTE
Fall    Patient fell and was too weak to ambulate without holding onto the wall today. Patient typically ambulates independently. Has history of UTIs and follows with urology.    - VSSAF, labs without acute abormalities  - appears dry on exam  - suspect mechanical fall 2/2 weakness from dehydration and possible UTI  - CT cervical spine, CT head without acute process  - patient is incontinent so difficult to obtain UA in ED, f/u results  - PT/OT  - fall precautions

## 2022-04-09 NOTE — PLAN OF CARE
Problem: Physical Therapy  Goal: Physical Therapy Goal  Description: Goals to be met by: 22     Patient will increase functional independence with mobility by performin. Supine to sit with MInimal Assistance  2. Sit to supine with MInimal Assistance  3. Sit to stand transfer with Contact Guard Assistance  4. Gait  x 20 feet with Minimal Assistance using No Assistive Device or hand held assist.  5. Lower extremity exercise program x 20 reps per handout, with assistance as needed      Outcome: Ongoing, Progressing  Pt progressing, appropriate goals established

## 2022-04-09 NOTE — H&P
[facility] Dept  [facility] Medicine  History & Physical    Patient Name: Lang Kumar  MRN: [id number]  Patient Class: OP- Observation  Admission Date: 4/8/2022  Attending Physician: Jacklyn Sarah MD   Primary Care Provider: Ursula Connelly MD         Patient information was obtained from patient, past medical records and ER records.     Subjective:     Principal Problem:Generalized weakness    Chief Complaint:   Chief Complaint   Patient presents with    Altered Mental Status     Hx of Dementia. Per pt's daughters, they noticed progressive declining over the course of today stating pt has been tremulous and unable to ambulate w/o assistance. Unwitnessed fall yesterday, 4/7/22 - bruising noted to left hand.        HPI: Lang Kumar is a 89 y.o. female with a PMHx of dementia and previous CVA who was brought to the ED by daughter for generalized weakness and inability to ambulate. Patient's daughter present at bedside to assist in history. The patient was in her usually state of health until today when her daughter noticed she was having trouble ambulating. Typically she ambulates independently to and from the restroom, but today she was struggling and had to hold onto the wall to maintain her balance. Daughter also noticed bruising and injury to her left hand. Patient was able to recall falling in her bedroom. Ms. Kumar is at her baseline mental status, oriented to self and family, with no worsening confusion. Patient has a history of UTIs and endorses dysuria. No other complaints.     ED: VSSAF. Labs without acute abnormality. CT hip and cervical spine without acute fracture. CT head without acute hemorrhage or infarct. XR L hand with acute mildly displaced fracture of the 5th metacarpal neck. Pt placed in splint.       Past Medical History:   Diagnosis Date    Abnormal exam or test finding 12/2011    coronary calcium score 204 = moderate plaque burden and high coronary heart disease risk     Anxiety 10/7/2021    Arthritis     Atherosclerosis of aortic arch     - noted on CXR 11/2016    Borderline hyperlipidemia     Coronary artery disease involving native coronary artery of native heart without angina pectoris 11/30/2016    Insomnia 10/7/2021    Major neurocognitive disorder due to multiple etiologies     Osteoporosis, post-menopausal     Stroke 9/7/2020    Urge and stress incontinence     Vitamin D deficiency disease        Past Surgical History:   Procedure Laterality Date    CATARACT EXTRACTION, BILATERAL      COSMETIC SURGERY      [place] Colpocleisis  08/19/2014    perineorrhaphy and posterior colporhaphy done concommitantly    OPEN REDUCTION AND INTERNAL FIXATION (ORIF) OF FRACTURE OF DISTAL HUMERUS Left 8/16/2018    Procedure: ORIF, FRACTURE, HUMERUS, DISTAL, LEFT;  Surgeon: Sonu Carmona MD;  Location: [facility] OR [address] [place] [address];  Service: Orthopedics;  Laterality: Left;       Review of patient's allergies indicates:  No Known Allergies    No current facility-administered medications on file prior to encounter.     Current Outpatient Medications on File Prior to Encounter   Medication Sig    aspirin (ECOTRIN) 81 MG EC tablet Take 1 tablet (81 mg total) by mouth once daily.    donepeziL (ARICEPT) 10 MG tablet TAKE 1 TABLET EVERY EVENING    ergocalciferol (VITAMIN D2) 50,000 unit Cap Take 1 capsule (50,000 Units total) by mouth every 7 days.    memantine (NAMENDA) 10 MG Tab TAKE 1 TABLET (10 MG TOTAL) BY MOUTH 2 (TWO) TIMES DAILY.    mirabegron (MYRBETRIQ) 25 mg Tb24 ER tablet Take 1 tablet (25 mg total) by mouth once daily.    multivitamin (ONE DAILY MULTIVITAMIN) per tablet Take 1 tablet by mouth once daily.    traZODone (DESYREL) 50 MG tablet     traZODone (DESYREL) 50 MG tablet TAKE 1 TABLET THREE TIMES DAILY     Family History       Problem Relation (Age of Onset)    Arthritis Sister    Coronary artery disease Father    Fibromyalgia Sister    Lung cancer Sister    Stroke  Mother          Tobacco Use    Smoking status: Former Smoker     Packs/day: 0.50     Types: Cigarettes     Quit date: 1970     Years since quittin.9    Smokeless tobacco: Never Used   Substance and Sexual Activity    Alcohol use: Not Currently     Comment: once a week    Drug use: No    Sexual activity: Never     Review of Systems   Unable to perform ROS: Dementia   Genitourinary:  Positive for dysuria.   Neurological:  Positive for weakness.   Psychiatric/Behavioral:  Negative for confusion.    Objective:     Vital Signs (Most Recent):  Temp: 98.7 °F (37.1 °C) (22)  Pulse: 69 (225)  Resp: 15 (22)  BP: 123/63 (22)  SpO2: 96 % (22) Vital Signs (24h Range):  Temp:  [98.7 °F (37.1 °C)] 98.7 °F (37.1 °C)  Pulse:  [69-81] 69  Resp:  [15-18] 15  SpO2:  [94 %-100 %] 96 %  BP: (123-167)/(63-71) 123/63     Weight: 54 kg (119 lb)  Body mass index is 21.08 kg/m².    Physical Exam  Vitals and nursing note reviewed.   Constitutional:       General: She is not in acute distress.     Appearance: She is well-developed.   HENT:      Head: Normocephalic and atraumatic.      Mouth/Throat:      Mouth: Mucous membranes are dry.   Eyes:      Conjunctiva/sclera: Conjunctivae normal.      Pupils: Pupils are equal, round, and reactive to light.   Cardiovascular:      Rate and Rhythm: Normal rate and regular rhythm.      Heart sounds: Normal heart sounds.   Pulmonary:      Effort: Pulmonary effort is normal. No respiratory distress.      Breath sounds: Normal breath sounds. No wheezing.   Abdominal:      General: Bowel sounds are normal. There is no distension.      Palpations: Abdomen is soft.      Tenderness: There is no abdominal tenderness.   Musculoskeletal:         General: Signs of injury (left hand) present. Normal range of motion.      Cervical back: Normal range of motion and neck supple.      Comments: Mild bruising noted to left hand, wrapped in dressing with  splint in place   Skin:     General: Skin is warm and dry.      Capillary Refill: Capillary refill takes less than 2 seconds.      Findings: No rash.   Neurological:      General: No focal deficit present.      Mental Status: She is alert. Mental status is at baseline.      Cranial Nerves: No cranial nerve deficit.      Comments: Awake, alert, oriented to self and daughter at bedside (baseline). Following commands. Strength and sensation intact.    Psychiatric:         Behavior: Behavior normal.         Thought Content: Thought content normal.         Judgment: Judgment normal.         CRANIAL NERVES     CN III, IV, VI   Pupils are equal, round, and reactive to light.     Significant Labs: All pertinent labs within the past 24 hours have been reviewed.  CBC:   Recent Labs   Lab 04/08/22 2053   WBC 14.27*   HGB 12.4   HCT 38.0        CMP:   Recent Labs   Lab 04/08/22 2053      K 3.8      CO2 24   *   BUN 20   CREATININE 0.7   CALCIUM 9.4   PROT 6.5   ALBUMIN 3.5   BILITOT 0.4   ALKPHOS 72   AST 23   ALT 18   ANIONGAP 11   EGFRNONAA >60.0       Significant Imaging: I have reviewed all pertinent imaging results/findings within the past 24 hours.    Assessment/Plan:     * Generalized weakness  Fall    Patient fell and was too weak to ambulate without holding onto the wall today. Patient typically ambulates independently. Has history of UTIs and follows with urology.    - VSSAF, labs without acute abormalities  - appears dry on exam  - suspect mechanical fall 2/2 weakness from dehydration and possible UTI  - CT cervical spine, CT head without acute process  - patient is incontinent so difficult to obtain UA in ED, f/u results  - PT/OT  - fall precautions    Closed displaced fracture of neck of fifth metacarpal bone of left hand  - XR with acute mildly displaced fracture of the left 5th metacarpal neck with adjacent soft tissue edema  - placed in splint in ED, continue    Major neurocognitive  disorder due to multiple etiologies  Dementia    - baseline mental status patient is oriented to self and family, has sitters at home and lives with son  - continue aricept nightly, namenda BID  - trazodone 150 mg nightly  - delirium precautions    Insomnia  - continue trazodone nightly    Stroke  - continue ASA    Coronary artery disease involving native coronary artery of native heart without angina pectoris  - continue ASA  - not on statin    VTE Risk Mitigation (From admission, onward)         Ordered     enoxaparin injection 40 mg  Daily         04/09/22 0225     IP VTE HIGH RISK PATIENT  Once         04/09/22 0225     Place sequential compression device  Until discontinued         04/09/22 0225                   Verónica So PA-C  Department of Hospital Medicine   [facility] - Emergency Dept

## 2022-04-09 NOTE — HPI
Lang Kumar is a 89 y.o. female with a PMHx of dementia and previous CVA who was brought to the ED by daughter for generalized weakness and inability to ambulate. Patient's daughter present at bedside to assist in history. The patient was in her usually state of health until today when her daughter noticed she was having trouble ambulating. Typically she ambulates independently to and from the restroom, but today she was struggling and had to hold onto the wall to maintain her balance. Daughter also noticed bruising and injury to her left hand. Patient was able to recall falling in her bedroom. Ms. Kumar is at her baseline mental status, oriented to self and family, with no worsening confusion. Patient has a history of UTIs and endorses dysuria. No other complaints.     ED: VSSAF. Labs without acute abnormality. CT hip and cervical spine without acute fracture. CT head without acute hemorrhage or infarct. XR L hand with acute mildly displaced fracture of the 5th metacarpal neck. Pt placed in splint.

## 2022-04-09 NOTE — SUBJECTIVE & OBJECTIVE
"Interval History: Patient seen and examined today with daughter at bedside. She reports that over the past several days the patient has become increasingly weak and has been "shaking."  Daughter reports the she has been eating and drinking as normal at home. UA unable to be obtained to incorrect Purewick placement and incontinence. Bladder scan with <100 cc, will order 500 mL NS bolus and straight cath in order to obtain UA. Ortho consulted for left 5th metacarpal fracture, no surgical intervention needed at this time. Placed in ulnar gutter splint and plan for follow-up outpatient. PT/OT recommend HH and family is agreeable to this plan.    Review of Systems   Unable to perform ROS: Dementia   Genitourinary:  Positive for dysuria.   Neurological:  Positive for weakness.   Psychiatric/Behavioral:  Negative for confusion.    Objective:     Vital Signs (Most Recent):  Temp: 98 °F (36.7 °C) (04/09/22 1134)  Pulse: 66 (04/09/22 1134)  Resp: 18 (04/09/22 1216)  BP: (!) 169/85 (04/09/22 1134)  SpO2: (!) 90 % (04/09/22 1134)   Vital Signs (24h Range):  Temp:  [98 °F (36.7 °C)-98.7 °F (37.1 °C)] 98 °F (36.7 °C)  Pulse:  [66-81] 66  Resp:  [15-18] 18  SpO2:  [90 %-100 %] 90 %  BP: (123-180)/(63-85) 169/85     Weight: 50.5 kg (111 lb 5.3 oz)  Body mass index is 19.73 kg/m².  No intake or output data in the 24 hours ending 04/09/22 1429   Physical Exam  Vitals and nursing note reviewed.   Constitutional:       General: She is not in acute distress.     Appearance: She is well-developed.   HENT:      Head: Normocephalic and atraumatic.      Mouth/Throat:      Mouth: Mucous membranes are dry.   Eyes:      Conjunctiva/sclera: Conjunctivae normal.      Pupils: Pupils are equal, round, and reactive to light.   Cardiovascular:      Rate and Rhythm: Normal rate and regular rhythm.      Heart sounds: Normal heart sounds.   Pulmonary:      Effort: Pulmonary effort is normal. No respiratory distress.      Breath sounds: Normal breath " sounds. No wheezing.   Abdominal:      General: Bowel sounds are normal. There is no distension.      Palpations: Abdomen is soft.      Tenderness: There is no abdominal tenderness.   Musculoskeletal:         General: Signs of injury (left hand) present. Normal range of motion.      Cervical back: Normal range of motion and neck supple.      Comments: Mild bruising noted to left hand, wrapped in dressing with splint in place   Skin:     General: Skin is warm and dry.      Capillary Refill: Capillary refill takes less than 2 seconds.      Findings: No rash.   Neurological:      General: No focal deficit present.      Mental Status: She is alert. Mental status is at baseline.      Cranial Nerves: No cranial nerve deficit.      Comments: Awake, alert, oriented to self and daughter at bedside (baseline). Following commands. Strength and sensation intact.    Psychiatric:         Behavior: Behavior normal.         Thought Content: Thought content normal.         Judgment: Judgment normal.       Significant Labs: All pertinent labs within the past 24 hours have been reviewed.  BMP:   Recent Labs   Lab 04/09/22  0606   GLU 99      K 3.8      CO2 25   BUN 19   CREATININE 0.7   CALCIUM 9.1   MG 1.9     CBC:   Recent Labs   Lab 04/08/22 2053 04/09/22  0606   WBC 14.27* 10.18   HGB 12.4 12.2   HCT 38.0 37.2    218       Significant Imaging: I have reviewed all pertinent imaging results/findings within the past 24 hours.

## 2022-04-09 NOTE — CONSULTS
[facility] - Tele[facility]down ([place] [address])  Orthopedics  Consult Note    Patient Name: Lang Kumar  MRN: [id number]  Admission Date: 4/8/2022  Hospital Length of Stay: 0 days  Attending Provider: Brandt Barkley MD  Primary Care Provider: Ursula Connelly MD      Inpatient consult to Orthopedic Surgery  Consult performed by: Jaciel Mckeon MD  Consult ordered by: Stefany Soliman PA-C        Subjective:     Principal Problem:Generalized weakness    Chief Complaint:   Chief Complaint   Patient presents with    Altered Mental Status     Hx of Dementia. Per pt's daughters, they noticed progressive declining over the course of today stating pt has been tremulous and unable to ambulate w/o assistance. Unwitnessed fall yesterday, 4/7/22 - bruising noted to left hand.        HPI: Lang Kumar is a 89 y.o. female with PMH advanced dementia, osteoporosis, CAD, CVA, HL, and urinary incontinence admitted for generalized weakness and debility after fall at home. Ortho consulted for left hand 5th metacarpal neck fracture management recs. Patient was seen in the ED where daughter endorsed history of fall onto left hand 2/2 generalized weakness over the past few days. Since then patient has had left hand swelling and bruising with reports of pain. XR in ED showed mildly angulated 5th metacarpal neck fracture. She was placed in ulnar gutter splint and admitted to hospital medicine for workup of weakness and PT evaluation. On evaluated at bedside today patient had removed ulnar gutter splint 2/2 dementia. Daughter at bedside for collateral history. Patient endorses minimal pain. No open wounds on left hand. Denies any paresthesias to left hand. RHD.           Past Medical History:   Diagnosis Date    Abnormal exam or test finding 12/2011    coronary calcium score 204 = moderate plaque burden and high coronary heart disease risk    Anxiety 10/7/2021    Arthritis     Atherosclerosis of aortic arch     - noted on CXR  11/2016    Borderline hyperlipidemia     Coronary artery disease involving native coronary artery of native heart without angina pectoris 11/30/2016    Insomnia 10/7/2021    Major neurocognitive disorder due to multiple etiologies     Osteoporosis, post-menopausal     Stroke 9/7/2020    Urge and stress incontinence     Vitamin D deficiency disease        Past Surgical History:   Procedure Laterality Date    CATARACT EXTRACTION, BILATERAL      COSMETIC SURGERY      [place] Colpocleisis  08/19/2014    perineorrhaphy and posterior colporhaphy done concommitantly    OPEN REDUCTION AND INTERNAL FIXATION (ORIF) OF FRACTURE OF DISTAL HUMERUS Left 8/16/2018    Procedure: ORIF, FRACTURE, HUMERUS, DISTAL, LEFT;  Surgeon: Sonu Carmona MD;  Location: [facility] OR [address] [place] [address];  Service: Orthopedics;  Laterality: Left;       Review of patient's allergies indicates:  No Known Allergies    Current Facility-Administered Medications   Medication    acetaminophen tablet 650 mg    albuterol-ipratropium 2.5 mg-0.5 mg/3 mL nebulizer solution 3 mL    aluminum-magnesium hydroxide-simethicone 200-200-20 mg/5 mL suspension 30 mL    aspirin EC tablet 81 mg    dextrose 10% bolus 125 mL    dextrose 10% bolus 250 mL    donepeziL tablet 10 mg    enoxaparin injection 40 mg    glucagon (human recombinant) injection 1 mg    glucose chewable tablet 16 g    glucose chewable tablet 24 g    melatonin tablet 6 mg    memantine tablet 10 mg    multivitamin tablet    naloxone 0.4 mg/mL injection 0.02 mg    ondansetron disintegrating tablet 8 mg    oxyCODONE-acetaminophen  mg per tablet 1 tablet    oxyCODONE-acetaminophen 5-325 mg per tablet 1 tablet    polyethylene glycol packet 17 g    prochlorperazine injection Soln 5 mg    simethicone chewable tablet 80 mg    sodium chloride 0.9% flush 10 mL    traZODone tablet 150 mg     Family History       Problem Relation (Age of Onset)    Arthritis Sister    Coronary artery  "disease Father    Fibromyalgia Sister    Lung cancer Sister    Stroke Mother          Tobacco Use    Smoking status: Former Smoker     Packs/day: 0.50     Types: Cigarettes     Quit date: 1970     Years since quittin.9    Smokeless tobacco: Never Used   Substance and Sexual Activity    Alcohol use: Not Currently     Comment: once a week    Drug use: No    Sexual activity: Never     Review of Systems   Unable to perform ROS: Dementia       Objective:     Vital Signs (Most Recent):  Temp: 98 °F (36.7 °C) (22 1134)  Pulse: 66 (22 1134)  Resp: 18 (22 1216)  BP: (!) 169/85 (22 1134)  SpO2: (!) 90 % (22 1134)   Vital Signs (24h Range):  Temp:  [98 °F (36.7 °C)-98.7 °F (37.1 °C)] 98 °F (36.7 °C)  Pulse:  [66-81] 66  Resp:  [15-18] 18  SpO2:  [90 %-100 %] 90 %  BP: (123-180)/(63-85) 169/85     Weight: 50.5 kg (111 lb 5.3 oz)  Height: 5' 2.99" (160 cm)  Body mass index is 19.73 kg/m².    Ortho/SPM Exam    Left hand exam:    Ecchymosis on dorsal aspect surrounding small finger metacarpal head/neck   No scars along hand  No open wounds  Mild to moderate swelling along dorsal aspect of hand at small finger  No thenar atrophy  No interossei atrophy  No obvious deformity    No masses  Mild TTP along small finger   No wrist effusion  Warm, well perfused    Fingers flex to distal palmar crease  Thumb opposes to base of small finger  No pain with active wrist flexion/extension    SILT and motor intact M/R/U  Radial pulse palpable    FPL intact  FDP/FDS intact to all fingers      Significant Labs: CBC:   Recent Labs   Lab 22  0606   WBC 14.27* 10.18   HGB 12.4 12.2   HCT 38.0 37.2    218     CMP:   Recent Labs   Lab 22  0606    141   K 3.8 3.8    106   CO2 24 25   * 99   BUN 20 19   CREATININE 0.7 0.7   CALCIUM 9.4 9.1   PROT 6.5  --    ALBUMIN 3.5  --    BILITOT 0.4  --    ALKPHOS 72  --    AST 23  --    ALT 18  --  "   ANIONGAP 11 10   EGFRNONAA >60.0 >60.0     All pertinent labs within the past 24 hours have been reviewed.    Significant Imaging: I have reviewed all pertinent imaging results/findings.  Angulated left metacarpal neck fracture of small finger.     Assessment/Plan:     Closed displaced fracture of neck of fifth metacarpal bone of left hand  Lang Kumar is a 89 y.o. female admitted for workup of weakness and therapy evaluation. Ortho consulted for left hand pain and XR findings of angulated small finger metacarpal neck fracture after fall at home. No other injuries reported after her fall and no pain elsewhere on exam.     - Re-splinted in well padded ulnar gutter splint  - Encouraged patient to remain NWB and to leave splint intact. Overlying ACE bandage was taped. Daughter reports patient is at high risk to remove splint due to dementia   - If continues to remove splint will plan for removable ulnar gutter brace to offer immobization  - Encouraged strict elevation to prevent swelling in fingers   - Plan for hand clinic follow up upon discharge for continued observation (patient's daughter will be contacted for details)            Jaciel Mckeon MD  Orthopedics  [facility] - [facility] ([place])

## 2022-04-09 NOTE — ASSESSMENT & PLAN NOTE
Dementia    - baseline mental status patient is oriented to self and family, has sitters at home and lives with son  - continue aricept nightly, namenda BID  - trazodone 150 mg nightly at 2000  - delirium precautions

## 2022-04-09 NOTE — SUBJECTIVE & OBJECTIVE
Past Medical History:   Diagnosis Date    Abnormal exam or test finding 12/2011    coronary calcium score 204 = moderate plaque burden and high coronary heart disease risk    Anxiety 10/7/2021    Arthritis     Atherosclerosis of aortic arch     - noted on CXR 11/2016    Borderline hyperlipidemia     Coronary artery disease involving native coronary artery of native heart without angina pectoris 11/30/2016    Insomnia 10/7/2021    Major neurocognitive disorder due to multiple etiologies     Osteoporosis, post-menopausal     Stroke 9/7/2020    Urge and stress incontinence     Vitamin D deficiency disease        Past Surgical History:   Procedure Laterality Date    CATARACT EXTRACTION, BILATERAL      COSMETIC SURGERY      [place] Colpocleisis  08/19/2014    perineorrhaphy and posterior colporhaphy done concommitantly    OPEN REDUCTION AND INTERNAL FIXATION (ORIF) OF FRACTURE OF DISTAL HUMERUS Left 8/16/2018    Procedure: ORIF, FRACTURE, HUMERUS, DISTAL, LEFT;  Surgeon: Sonu Carmona MD;  Location: [facility] OR [address] [place] [address];  Service: Orthopedics;  Laterality: Left;       Review of patient's allergies indicates:  No Known Allergies    Current Facility-Administered Medications   Medication    acetaminophen tablet 650 mg    albuterol-ipratropium 2.5 mg-0.5 mg/3 mL nebulizer solution 3 mL    aluminum-magnesium hydroxide-simethicone 200-200-20 mg/5 mL suspension 30 mL    aspirin EC tablet 81 mg    dextrose 10% bolus 125 mL    dextrose 10% bolus 250 mL    donepeziL tablet 10 mg    enoxaparin injection 40 mg    glucagon (human recombinant) injection 1 mg    glucose chewable tablet 16 g    glucose chewable tablet 24 g    melatonin tablet 6 mg    memantine tablet 10 mg    multivitamin tablet    naloxone 0.4 mg/mL injection 0.02 mg    ondansetron disintegrating tablet 8 mg    oxyCODONE-acetaminophen  mg per tablet 1 tablet    oxyCODONE-acetaminophen 5-325 mg per tablet 1 tablet    polyethylene glycol packet 17 g     "prochlorperazine injection Soln 5 mg    simethicone chewable tablet 80 mg    sodium chloride 0.9% flush 10 mL    traZODone tablet 150 mg     Family History       Problem Relation (Age of Onset)    Arthritis Sister    Coronary artery disease Father    Fibromyalgia Sister    Lung cancer Sister    Stroke Mother          Tobacco Use    Smoking status: Former Smoker     Packs/day: 0.50     Types: Cigarettes     Quit date: 1970     Years since quittin.9    Smokeless tobacco: Never Used   Substance and Sexual Activity    Alcohol use: Not Currently     Comment: once a week    Drug use: No    Sexual activity: Never     Review of Systems   Unable to perform ROS: Dementia       Objective:     Vital Signs (Most Recent):  Temp: 98 °F (36.7 °C) (22 1134)  Pulse: 66 (22 1134)  Resp: 18 (22 1216)  BP: (!) 169/85 (22 1134)  SpO2: (!) 90 % (22 1134)   Vital Signs (24h Range):  Temp:  [98 °F (36.7 °C)-98.7 °F (37.1 °C)] 98 °F (36.7 °C)  Pulse:  [66-81] 66  Resp:  [15-18] 18  SpO2:  [90 %-100 %] 90 %  BP: (123-180)/(63-85) 169/85     Weight: 50.5 kg (111 lb 5.3 oz)  Height: 5' 2.99" (160 cm)  Body mass index is 19.73 kg/m².    Ortho/SPM Exam    Left hand exam:    Ecchymosis on dorsal aspect surrounding small finger metacarpal head/neck   No scars along hand  No open wounds  Mild to moderate swelling along dorsal aspect of hand at small finger  No thenar atrophy  No interossei atrophy  No obvious deformity    No masses  Mild TTP along small finger   No wrist effusion  Warm, well perfused    Fingers flex to distal palmar crease  Thumb opposes to base of small finger  No pain with active wrist flexion/extension    SILT and motor intact M/R/U  Radial pulse palpable    FPL intact  FDP/FDS intact to all fingers      Significant Labs: CBC:   Recent Labs   Lab 22  0606   WBC 14.27* 10.18   HGB 12.4 12.2   HCT 38.0 37.2    218     CMP:   Recent Labs   Lab 223 " 04/09/22  0606    141   K 3.8 3.8    106   CO2 24 25   * 99   BUN 20 19   CREATININE 0.7 0.7   CALCIUM 9.4 9.1   PROT 6.5  --    ALBUMIN 3.5  --    BILITOT 0.4  --    ALKPHOS 72  --    AST 23  --    ALT 18  --    ANIONGAP 11 10   EGFRNONAA >60.0 >60.0     All pertinent labs within the past 24 hours have been reviewed.    Significant Imaging: I have reviewed all pertinent imaging results/findings.  Angulated left metacarpal neck fracture of small finger.

## 2022-04-09 NOTE — NURSING
No acute events during shift. Patient free of restraints this shift. Patient c/o pain to LUE. PRN pain meds admin Patient resting quietly in bed with daughter at bedside. VSS. Fall and safety precautions maintained. POC discussed with patient and daughter. Will continue to monitor.

## 2022-04-09 NOTE — ASSESSMENT & PLAN NOTE
- XR with acute mildly displaced fracture of the left 5th metacarpal neck with adjacent soft tissue edema  - placed in splint in ED, continue

## 2022-04-09 NOTE — CODE/ RAPID DOCUMENTATION
"RAPID RESPONSE NURSE CHART REVIEW        Chart Reviewed: 04/09/2022, 5:15 AM    MRN: [id number]  Bed: 7077/7077 A    Dx: Generalized weakness    Leverda A Valence has a past medical history of Abnormal exam or test finding, Anxiety, Arthritis, Atherosclerosis of aortic arch, Borderline hyperlipidemia, Coronary artery disease involving native coronary artery of native heart without angina pectoris, Insomnia, Major neurocognitive disorder due to multiple etiologies, Osteoporosis, post-menopausal, Stroke, Urge and stress incontinence, and Vitamin D deficiency disease.    Last VS: /63   Pulse (!) 135   Temp 98.7 °F (37.1 °C)   Resp (!) 40   Ht 5' 3" (1.6 m)   Wt 54 kg (119 lb)   SpO2 95%   BMI 21.08 kg/m²     24H Vital Sign Range:  Temp:  [98.7 °F (37.1 °C)]   Pulse:  []   Resp:  [15-40]   BP: (123-167)/(63-71)   SpO2:  [94 %-100 %]     Level of Consciousness (AVPU): alert    Recent Labs     04/08/22 2053   WBC 14.27*   HGB 12.4   HCT 38.0          Recent Labs     04/08/22 2053      K 3.8      CO2 24   CREATININE 0.7   *        No results for input(s): PH, PCO2, PO2, HCO3, POCSATURATED, BE in the last 72 hours.     OXYGEN:  Flow (L/min): 15  Oxygen Concentration (%): 50  O2 Device (Oxygen Therapy): venti mask    MEWS score: 1    Charge RN, Yolande contacted. No concerns verbalized at this time. Instructed to call [facility] for further concerns or assistance.    Adin Samuel RN      "

## 2022-04-09 NOTE — PLAN OF CARE
OT eval completed and POC established 4/9/2022    Problem: Occupational Therapy  Goal: Occupational Therapy Goal  Description: Goals to be met by: 4/23/2022     Patient will increase functional independence with ADLs by performing:    Grooming while EOB with Minimal Assistance.  Sitting at edge of bed x15 minutes with Contact Guard Assistance.  Supine to sit with Minimal Assistance.  Step transfer with Contact Guard Assistance    Outcome: Ongoing, Progressing

## 2022-04-09 NOTE — SUBJECTIVE & OBJECTIVE
Past Medical History:   Diagnosis Date    Abnormal exam or test finding 12/2011    coronary calcium score 204 = moderate plaque burden and high coronary heart disease risk    Anxiety 10/7/2021    Arthritis     Atherosclerosis of aortic arch     - noted on CXR 11/2016    Borderline hyperlipidemia     Coronary artery disease involving native coronary artery of native heart without angina pectoris 11/30/2016    Insomnia 10/7/2021    Major neurocognitive disorder due to multiple etiologies     Osteoporosis, post-menopausal     Stroke 9/7/2020    Urge and stress incontinence     Vitamin D deficiency disease        Past Surgical History:   Procedure Laterality Date    CATARACT EXTRACTION, BILATERAL      COSMETIC SURGERY      [place] Colpocleisis  08/19/2014    perineorrhaphy and posterior colporhaphy done concommitantly    OPEN REDUCTION AND INTERNAL FIXATION (ORIF) OF FRACTURE OF DISTAL HUMERUS Left 8/16/2018    Procedure: ORIF, FRACTURE, HUMERUS, DISTAL, LEFT;  Surgeon: Sonu Carmona MD;  Location: [facility] OR [address] [place] [address];  Service: Orthopedics;  Laterality: Left;       Review of patient's allergies indicates:  No Known Allergies    No current facility-administered medications on file prior to encounter.     Current Outpatient Medications on File Prior to Encounter   Medication Sig    aspirin (ECOTRIN) 81 MG EC tablet Take 1 tablet (81 mg total) by mouth once daily.    donepeziL (ARICEPT) 10 MG tablet TAKE 1 TABLET EVERY EVENING    ergocalciferol (VITAMIN D2) 50,000 unit Cap Take 1 capsule (50,000 Units total) by mouth every 7 days.    memantine (NAMENDA) 10 MG Tab TAKE 1 TABLET (10 MG TOTAL) BY MOUTH 2 (TWO) TIMES DAILY.    mirabegron (MYRBETRIQ) 25 mg Tb24 ER tablet Take 1 tablet (25 mg total) by mouth once daily.    multivitamin (ONE DAILY MULTIVITAMIN) per tablet Take 1 tablet by mouth once daily.    traZODone (DESYREL) 50 MG tablet     traZODone (DESYREL) 50 MG tablet TAKE 1 TABLET THREE TIMES  DAILY     Family History       Problem Relation (Age of Onset)    Arthritis Sister    Coronary artery disease Father    Fibromyalgia Sister    Lung cancer Sister    Stroke Mother          Tobacco Use    Smoking status: Former Smoker     Packs/day: 0.50     Types: Cigarettes     Quit date: 1970     Years since quittin.9    Smokeless tobacco: Never Used   Substance and Sexual Activity    Alcohol use: Not Currently     Comment: once a week    Drug use: No    Sexual activity: Never     Review of Systems   Unable to perform ROS: Dementia   Genitourinary:  Positive for dysuria.   Neurological:  Positive for weakness.   Psychiatric/Behavioral:  Negative for confusion.    Objective:     Vital Signs (Most Recent):  Temp: 98.7 °F (37.1 °C) (22)  Pulse: 69 (22 0045)  Resp: 15 (22)  BP: 123/63 (22)  SpO2: 96 % (22) Vital Signs (24h Range):  Temp:  [98.7 °F (37.1 °C)] 98.7 °F (37.1 °C)  Pulse:  [69-81] 69  Resp:  [15-18] 15  SpO2:  [94 %-100 %] 96 %  BP: (123-167)/(63-71) 123/63     Weight: 54 kg (119 lb)  Body mass index is 21.08 kg/m².    Physical Exam  Vitals and nursing note reviewed.   Constitutional:       General: She is not in acute distress.     Appearance: She is well-developed.   HENT:      Head: Normocephalic and atraumatic.      Mouth/Throat:      Mouth: Mucous membranes are dry.   Eyes:      Conjunctiva/sclera: Conjunctivae normal.      Pupils: Pupils are equal, round, and reactive to light.   Cardiovascular:      Rate and Rhythm: Normal rate and regular rhythm.      Heart sounds: Normal heart sounds.   Pulmonary:      Effort: Pulmonary effort is normal. No respiratory distress.      Breath sounds: Normal breath sounds. No wheezing.   Abdominal:      General: Bowel sounds are normal. There is no distension.      Palpations: Abdomen is soft.      Tenderness: There is no abdominal tenderness.   Musculoskeletal:         General: Signs of injury (left  hand) present. Normal range of motion.      Cervical back: Normal range of motion and neck supple.      Comments: Mild bruising noted to left hand, wrapped in dressing with splint in place   Skin:     General: Skin is warm and dry.      Capillary Refill: Capillary refill takes less than 2 seconds.      Findings: No rash.   Neurological:      General: No focal deficit present.      Mental Status: She is alert. Mental status is at baseline.      Cranial Nerves: No cranial nerve deficit.      Comments: Awake, alert, oriented to self and daughter at bedside (baseline). Following commands. Strength and sensation intact.    Psychiatric:         Behavior: Behavior normal.         Thought Content: Thought content normal.         Judgment: Judgment normal.         CRANIAL NERVES     CN III, IV, VI   Pupils are equal, round, and reactive to light.     Significant Labs: All pertinent labs within the past 24 hours have been reviewed.  CBC:   Recent Labs   Lab 04/08/22 2053   WBC 14.27*   HGB 12.4   HCT 38.0        CMP:   Recent Labs   Lab 04/08/22 2053      K 3.8      CO2 24   *   BUN 20   CREATININE 0.7   CALCIUM 9.4   PROT 6.5   ALBUMIN 3.5   BILITOT 0.4   ALKPHOS 72   AST 23   ALT 18   ANIONGAP 11   EGFRNONAA >60.0       Significant Imaging: I have reviewed all pertinent imaging results/findings within the past 24 hours.

## 2022-04-09 NOTE — ASSESSMENT & PLAN NOTE
- XR with acute mildly displaced fracture of the left 5th metacarpal neck with adjacent soft tissue edema  - placed in splint in ED  - ortho consulted, placed an ulnar gutter splint and plan for outpatient follow-up

## 2022-04-09 NOTE — PROVIDER PROGRESS NOTES - EMERGENCY DEPT.
Signout Note    I received signout from the previous provider.     Chief complaint:  Altered Mental Status (Hx of Dementia. Per pt's daughters, they noticed progressive declining over the course of today stating pt has been tremulous and unable to ambulate w/o assistance. Unwitnessed fall yesterday, 4/7/22 - bruising noted to left hand.)      Pertinent history &exam:  Lang Kumar is a 89 y.o. female with pertinent PMH of dementia, stroke who presented to emergency department with complaint of generalized weakness.  There is no altered mental status although that was the triage complaint.  Labs reviewed by previous physician.     X-ray demonstrates metacarpal fracture which does not require reduction. At time of sign-out, pending urinalysis, and CT scans for admission to hospital.    Vitals:    04/09/22 2025   BP:    Pulse:    Resp: 17   Temp:        Imaging Studies:    X-Ray Hand 3 view Left   Final Result      Fifth metacarpal fracture.         Electronically signed by: Howard Mcclellan   Date:    04/09/2022   Time:    12:38      CT Hip Without Contrast Bilateral   Final Result      1.  No definite CT evidence of acute displaced fracture or dislocation.  If there is persistent concern for occult fracture, more sensitive localized assessment could be performed with MRI as warranted.      2.  Chronic L4 compression deformity.      3.  Moderate retained stool within the rectum.         Electronically signed by: Erwin Farrell MD   Date:    04/09/2022   Time:    01:38      CT Thigh Without Contrast Bilateral   Final Result      1.  No definite CT evidence of acute displaced fracture or dislocation.  If there is persistent concern for occult fracture, more sensitive localized assessment could be performed with MRI as warranted.      2.  Chronic L4 compression deformity.      3.  Moderate retained stool within the rectum.         Electronically signed by: Erwin Farrell MD   Date:    04/09/2022   Time:    01:38       CT Cervical Spine Without Contrast   Final Result      1.  No CT evidence of acute fracture or traumatic subluxation of the cervical spine.  Mild multilevel degenerative changes as above.      2.  Complex 2.6 cm left thyroid lobe nodule.  Consider further assessment with dedicated thyroid ultrasound as clinically warranted.         Electronically signed by: Erwin Farrell MD   Date:    04/09/2022   Time:    00:19      CT Head Without Contrast   Final Result      No CT evidence of acute intracranial abnormality. Clinical correlation and further evaluation as warranted.      Chronic senescent and microvascular ischemic changes.         Electronically signed by: Erwin Farrell MD   Date:    04/09/2022   Time:    00:12      X-Ray Chest PA And Lateral   Final Result      Mild platelike atelectatic change versus scarring at the right lung base.         Electronically signed by: Yoel Hurtado MD   Date:    04/08/2022   Time:    23:01      X-Ray Hand 3 View Left   Final Result      Acute mildly displaced fracture of the left 5th metacarpal neck with adjacent soft tissue edema.         Electronically signed by: Erwin Farrell MD   Date:    04/08/2022   Time:    22:29          Medications Given:  Medications   sodium chloride 0.9% flush 10 mL (has no administration in time range)   albuterol-ipratropium 2.5 mg-0.5 mg/3 mL nebulizer solution 3 mL (has no administration in time range)   melatonin tablet 6 mg (has no administration in time range)   ondansetron disintegrating tablet 8 mg (has no administration in time range)   prochlorperazine injection Soln 5 mg (has no administration in time range)   polyethylene glycol packet 17 g (has no administration in time range)   simethicone chewable tablet 80 mg (has no administration in time range)   aluminum-magnesium hydroxide-simethicone 200-200-20 mg/5 mL suspension 30 mL (has no administration in time range)   acetaminophen tablet 650 mg (650 mg Oral Given 4/9/22 2959)    naloxone 0.4 mg/mL injection 0.02 mg (has no administration in time range)   glucose chewable tablet 16 g (has no administration in time range)   glucose chewable tablet 24 g (has no administration in time range)   dextrose 10% bolus 125 mL (has no administration in time range)   dextrose 10% bolus 250 mL (has no administration in time range)   glucagon (human recombinant) injection 1 mg (has no administration in time range)   enoxaparin injection 40 mg (40 mg Subcutaneous Given 4/9/22 1627)   aspirin EC tablet 81 mg (81 mg Oral Given 4/9/22 0846)   donepeziL tablet 10 mg (10 mg Oral Given 4/9/22 2024)   memantine tablet 10 mg (10 mg Oral Given 4/9/22 2025)   multivitamin tablet (1 tablet Oral Given 4/9/22 0846)   oxyCODONE-acetaminophen  mg per tablet 1 tablet (1 tablet Oral Given 4/9/22 2025)   oxyCODONE-acetaminophen 5-325 mg per tablet 1 tablet (has no administration in time range)   traZODone tablet 150 mg (150 mg Oral Given 4/9/22 2025)   acetaminophen tablet 1,000 mg (1,000 mg Oral Given 4/8/22 2345)   traZODone tablet 50 mg (50 mg Oral Given 4/9/22 0301)   sodium chloride 0.9% bolus 500 mL (0 mLs Intravenous Stopped 4/9/22 0443)   sodium chloride 0.9% bolus 500 mL (0 mLs Intravenous Stopped 4/9/22 1623)       Pending Items/ MDM:  89 y.o. female with generalized weakness and inability to ambulate.  Urinalysis and CTs reviewed.  Admitted to Internal Medicine.    Diagnostic Impression:    1. Generalized weakness    2. Fall    3. Chest pain         ED Disposition Condition    Observation              Patient and family understands the plan and is in agreement, verbalized understanding, questions answered    Marsha Peters MD  Emergency Medicine

## 2022-04-09 NOTE — PT/OT/SLP EVAL
"Occupational Therapy   Co-Evaluation and Treatment    Name: Lang Kumar  MRN: [id number]  Admitting Diagnosis:  Generalized weakness  Recent Surgery: * No surgery found *      Recommendations:     Discharge Recommendations: home health OT  Discharge Equipment Recommendations:  wheelchair (family requesting small w/c or transport w/c)  Barriers to discharge:   (requiring increased assist with mobility)    Assessment:     Lang Kumar is a 89 y.o. female with a medical diagnosis of Generalized weakness. Patient admitted 4/8 with AMS, tremors, and fall 4/7 resulting in 5th metacarpal fx on L hand. She presents with the following performance deficits affecting function: weakness, impaired endurance, impaired self care skills, impaired functional mobilty, gait instability, pain, impaired balance, impaired cognition, decreased upper extremity function, decreased safety awareness. Patient agreeable to therapy evaluation but performance limited 2* c/o pain when sitting EOB and fearful of falling. Patient able to take side steps along EOB with min assist and required assist of 1 for sitting balance. At this time, patient will continue to benefit from acute skilled therapy intervention to address deficits/underlying impairments and progress towards prior level of function. After discharge, patient will benefit from receiving home health therapy services to ensure safety and independence in the home environment.    Rehab Prognosis: Fair; patient would benefit from acute skilled OT services to address these deficits and reach maximum level of function.       Plan:     Patient to be seen 3 x/week to address the above listed problems via self-care/home management, therapeutic activities, therapeutic exercises  · Plan of Care Expires: 05/08/22  · Plan of Care Reviewed with: patient, daughter    Subjective     Chief Complaint: pain "all over" with movement  Patient Comments/goals: "I want to go home"    Occupational " Profile:  Living Environment: Patient's son lives with her in a H with threshold to enter.  Previous level of function: Patient's son and 2 daughters assist as needed with ADLs; bathing, dressing, toileting, grooming. Patient able to perform self feeding. Patient ambulates with straight cane at baseline.  Equipment Used at Home: cane, straight, walker, rolling, shower chair, bedside commode  Assistance upon Discharge: 24/7 assist from children. Sitter Mon, Wed, and Fri for 5 hours.    Pain/Comfort:  · Pain Rating 1:  (no pain at rest but reported pain all over with movement)  · Pain Addressed 1: Reposition, Distraction, Cessation of Activity (PA present and notified)    Patients cultural, spiritual, Scientology conflicts given the current situation: no    Objective:     Communicated with: RN prior to session. Patient found HOB elevated with bed alarm, PureWick and daughter present upon OT entry to room.    Additional staff present: PT present for co-eval/tx as appropriate for patient 2* medical complexities, decreased activity tolerance for 2 sessions, and level of skilled assist needed for task completion.    General Precautions: Standard, fall   Orthopedic Precautions:N/A   Braces:  (L UE splint)  Respiratory Status: Room air    Occupational Performance:    Bed Mobility:    · Patient completed Supine to Sit with maximal assistance with HOB elevated  · Patient completed Sit to Supine with moderate assistance with HOB flat    Functional Mobility/Transfers:  · Patient completed Sit <> Stand Transfer with minimum assistance with hand-held assist   · Functional Mobility: Patient took 4 side steps along EOB with min assist with hand-held assist    Balance:   Sitting: patient required max assist initially and progressed to CGA   Standing: min assist of 2 with hand-held assist    Activities of Daily Living:  · Grooming: maximal assistance to wash face sitting EOB    Cognitive/Visual Perceptual:  Cognitive/Psychosocial  Skills:    -       Oriented to: Person and Place   -       Follows Commands/attention: able to follow commands with increased cues  -       Communication: clear/fluent  -       Memory: impaired; dementia  -       Safety awareness/insight to disability: impaired   -       Mood/Affect/Coping skills/emotional control: Appropriate to situation, Cooperative, Pleasant, Fearful of falling    Physical Exam:  Postural examination/scapula alignment:   -       Rounded shoulders  -       Forward head  Upper Extremity Range of Motion:    -       Right Upper Extremity: WFL  -       Left Upper Extremity: WFL  Upper Extremity Strength:    -       Right Upper Extremity: WFL  -       Left Upper Extremity: WFL  *L UE in splint 2* 5th metacarpal fracture; ortho present at end of session to evaluate     AMPAC 6 Click ADL:  AMPAC Total Score: 12    Treatment & Education:   Therapist provided facilitation and instruction of proper body mechanics and fall prevention strategies during tasks listed above.   Instructed patient to use call light to have nursing staff assist with needs/transfers.   Discussed OT POC and answered all questions within OT scope of practice.  Education:    Patient left HOB elevated with all lines intact, call button in reach, bed alarm on and patient's daughter and PA present    GOALS:   Multidisciplinary Problems     Occupational Therapy Goals        Problem: Occupational Therapy    Goal Priority Disciplines Outcome Interventions   Occupational Therapy Goal     OT, PT/OT Ongoing, Progressing    Description: Goals to be met by: 4/23/2022     Patient will increase functional independence with ADLs by performing:    Grooming while EOB with Minimal Assistance.  Sitting at edge of bed x15 minutes with Contact Guard Assistance.  Supine to sit with Minimal Assistance.  Step transfer with Contact Guard Assistance                     History:     Past Medical History:   Diagnosis Date    Abnormal exam or test finding  12/2011    coronary calcium score 204 = moderate plaque burden and high coronary heart disease risk    Anxiety 10/7/2021    Arthritis     Atherosclerosis of aortic arch     - noted on CXR 11/2016    Borderline hyperlipidemia     Coronary artery disease involving native coronary artery of native heart without angina pectoris 11/30/2016    Insomnia 10/7/2021    Major neurocognitive disorder due to multiple etiologies     Osteoporosis, post-menopausal     Stroke 9/7/2020    Urge and stress incontinence     Vitamin D deficiency disease        Past Surgical History:   Procedure Laterality Date    CATARACT EXTRACTION, BILATERAL      COSMETIC SURGERY      [place] Colpocleisis  08/19/2014    perineorrhaphy and posterior colporhaphy done concommitantly    OPEN REDUCTION AND INTERNAL FIXATION (ORIF) OF FRACTURE OF DISTAL HUMERUS Left 8/16/2018    Procedure: ORIF, FRACTURE, HUMERUS, DISTAL, LEFT;  Surgeon: Sonu Carmona MD;  Location: [facility] OR [address] [place] [address];  Service: Orthopedics;  Laterality: Left;       Time Tracking:     OT Date of Treatment: 04/09/22  OT Start Time: 1047  OT Stop Time: 1110  OT Total Time (min): 23 min    Billable Minutes:Evaluation 10  Therapeutic Activity 8    4/9/2022

## 2022-04-10 NOTE — PROGRESS NOTES
04/10/22 0358   Vital Signs   SpO2 98 %   Pulse Oximetry Type Continuous   Flow (L/min) 2   O2 Device (Oxygen Therapy) nasal cannula   BP (!) 196/91   MAP (mmHg) 130   BP Location Right arm   BP Method Automatic   Patient Position Lying   Orthostatic VS No     0345: Pt anxious/agitated, removing NC & pulse ox probe. C/o pain to L hand. Medicated w/PRN percocet 10/325mg. Tape applied to either side of NC & to pulse ox probe. NAD noted. ProFounderTM.    0358: Notified by tech of elevated /91. Pt remains very axious. Will allow percocet time to become therapeutic & repeat BP. Dtr @ BS comforting pt. NAD noted. WCTM.

## 2022-04-10 NOTE — PROGRESS NOTES
[facility] Tele[facility] ([address] Medicine  Progress Note    Patient Name: Lang Kumar  MRN: [id number]  Patient Class: OP- Observation   Admission Date: 4/8/2022  Length of Stay: 0 days  Attending Physician: Brandt Barkley MD  Primary Care Provider: Ursula Connelly MD        Subjective:     Principal Problem:Generalized weakness        HPI:  Lang Kumar is a 89 y.o. female with a PMHx of dementia and previous CVA who was brought to the ED by daughter for generalized weakness and inability to ambulate. Patient's daughter present at bedside to assist in history. The patient was in her usually state of health until today when her daughter noticed she was having trouble ambulating. Typically she ambulates independently to and from the restroom, but today she was struggling and had to hold onto the wall to maintain her balance. Daughter also noticed bruising and injury to her left hand. Patient was able to recall falling in her bedroom. Ms. Kumar is at her baseline mental status, oriented to self and family, with no worsening confusion. Patient has a history of UTIs and endorses dysuria. No other complaints.     ED: VSSAF. Labs without acute abnormality. CT hip and cervical spine without acute fracture. CT head without acute hemorrhage or infarct. XR L hand with acute mildly displaced fracture of the 5th metacarpal neck. Pt placed in splint.       Overview/Hospital Course:  Patient admitted to observation for generalized weakness. UA unable to be obtained due to pure wick and incontinence. Bladder scan <100, will give fluids and plan for a straight cath to obtain UA. PT/OT consulted and recommend HH. Ortho consulted for acute fracture of 5th metacarpal neck who re-splinted in well-padded ulnar gutter splint. Report no surgical intervention necessary at this time and to follow up at the ortho clinic. UA nitrate-positive for UTI, started on ceftriaxone 1g q24 hours. Follow cultures.        Interval History: Patient seen and examined today with daughter and son. Episode of hypoxia overnight requring 2L O2. CXR with evidence of atelectasis change versus scarring at the right lung base. Ordered incentive spirometry. Patient breathing comfortably on room air on exam but still feels too weak to ambulate. Patient's daughter reports that the patient is anxious as she does not want to be in the hospital. Increased trazodone to 200 mg nightly at 2000 and ordered hydroxyzine prn for anxiety. UA positive for UTI, started on ceftriaxone 1g q24 hours. Will follow cultures and susceptibility.    Review of Systems   Unable to perform ROS: Dementia   Genitourinary:  Positive for dysuria.   Neurological:  Positive for weakness.   Psychiatric/Behavioral:  Negative for confusion.    Objective:     Vital Signs (Most Recent):  Temp: 98 °F (36.7 °C) (04/10/22 1107)  Pulse: 93 (04/10/22 1107)  Resp: 18 (04/10/22 1107)  BP: 133/61 (04/10/22 1107)  SpO2: (!) 94 % (04/10/22 1110)   Vital Signs (24h Range):  Temp:  [97.8 °F (36.6 °C)-98.7 °F (37.1 °C)] 98 °F (36.7 °C)  Pulse:  [] 93  Resp:  [16-20] 18  SpO2:  [86 %-100 %] 94 %  BP: (133-196)/(61-91) 133/61     Weight: 50.5 kg (111 lb 5.3 oz)  Body mass index is 19.73 kg/m².    Intake/Output Summary (Last 24 hours) at 4/10/2022 1342  Last data filed at 4/10/2022 1300  Gross per 24 hour   Intake --   Output 400 ml   Net -400 ml      Physical Exam  Vitals and nursing note reviewed.   Constitutional:       General: She is not in acute distress.     Appearance: She is well-developed.   HENT:      Head: Normocephalic and atraumatic.      Mouth/Throat:      Mouth: Mucous membranes are dry.   Eyes:      Conjunctiva/sclera: Conjunctivae normal.      Pupils: Pupils are equal, round, and reactive to light.   Cardiovascular:      Rate and Rhythm: Normal rate and regular rhythm.      Heart sounds: Normal heart sounds.   Pulmonary:      Effort: Pulmonary effort is normal. No  respiratory distress.      Breath sounds: Normal breath sounds. No wheezing.   Abdominal:      General: Bowel sounds are normal. There is no distension.      Palpations: Abdomen is soft.      Tenderness: There is no abdominal tenderness.   Musculoskeletal:         General: Signs of injury (left hand) present. Normal range of motion.      Cervical back: Normal range of motion and neck supple.      Comments: Mild bruising noted to left hand, wrapped in dressing with splint in place   Skin:     General: Skin is warm and dry.      Capillary Refill: Capillary refill takes less than 2 seconds.      Findings: No rash.   Neurological:      General: No focal deficit present.      Mental Status: She is alert. Mental status is at baseline.      Cranial Nerves: No cranial nerve deficit.      Comments: Awake, alert, oriented to self and daughter at bedside (baseline). Following commands. Strength and sensation intact.    Psychiatric:         Behavior: Behavior normal.         Thought Content: Thought content normal.         Judgment: Judgment normal.       Significant Labs: All pertinent labs within the past 24 hours have been reviewed.  BMP:   Recent Labs   Lab 04/09/22  0606   GLU 99      K 3.8      CO2 25   BUN 19   CREATININE 0.7   CALCIUM 9.1   MG 1.9     CBC:   Recent Labs   Lab 04/08/22 2053 04/09/22  0606   WBC 14.27* 10.18   HGB 12.4 12.2   HCT 38.0 37.2    218       Significant Imaging: I have reviewed all pertinent imaging results/findings within the past 24 hours.      Assessment/Plan:      * Generalized weakness  Fall    Patient fell and was too weak to ambulate without holding onto the wall today. Patient typically ambulates independently. Has history of UTIs and follows with urology.    - VSSAF, labs without acute abormalities  - appears dry on exam  - suspect mechanical fall 2/2 weakness from dehydration and possible UTI  - CT cervical spine, CT head without acute process  - patient is  incontinent so difficult to obtain UA in ED, f/u results   - positive for UTI - ceftriaxone 1g q24 hours  - PT/OT recommend HH  - fall precautions    Major neurocognitive disorder due to multiple etiologies  Dementia    - baseline mental status patient is oriented to self and family, has sitters at home and lives with son  - continue aricept nightly, namenda BID  - trazodone 150 mg nightly at 2000 --> increased to 200 mg  - hydroxyzine 25 mg prn for anxiety  - delirium precautions    Closed displaced fracture of neck of fifth metacarpal bone of left hand  - XR with acute mildly displaced fracture of the left 5th metacarpal neck with adjacent soft tissue edema  - placed in splint in ED  - ortho consulted, placed an ulnar gutter splint and plan for outpatient follow-up    UTI (urinary tract infection)  - UA positive for UTI nitrate positive with many bacteria  - Pure wick in place  - Ceftriaxone 1g q24 hours - first dose given on 4/10  - follow urine culture and susceptibiltiy    Insomnia  - continue trazodone nightly --> increased to 200 mg   - melatonin prn    Stroke  - continue ASA    Coronary artery disease involving native coronary artery of native heart without angina pectoris  - continue ASA  - not on statin      VTE Risk Mitigation (From admission, onward)         Ordered     enoxaparin injection 40 mg  Daily         04/09/22 0225     IP VTE HIGH RISK PATIENT  Once         04/09/22 0225     Place sequential compression device  Until discontinued         04/09/22 0225                Discharge Planning   MIGUEL ÁNGEL: 4/12/2022     Code Status: Full Code   Is the patient medically ready for discharge?: No    Reason for patient still in hospital (select all that apply): Patient new problem, Patient trending condition and Treatment         Discussed patient's plan of care with Dr. Rubia Soliman PAChristiC  Department of Hospital Medicine   [facility] - Tele[facility]down ([place]-)

## 2022-04-10 NOTE — ASSESSMENT & PLAN NOTE
Fall    Patient fell and was too weak to ambulate without holding onto the wall today. Patient typically ambulates independently. Has history of UTIs and follows with urology.    - VSSAF, labs without acute abormalities  - appears dry on exam  - suspect mechanical fall 2/2 weakness from dehydration and possible UTI  - CT cervical spine, CT head without acute process  - patient is incontinent so difficult to obtain UA in ED, f/u results   - positive for UTI - ceftriaxone 1g q24 hours  - PT/OT recommend HH  - fall precautions

## 2022-04-10 NOTE — ASSESSMENT & PLAN NOTE
- UA positive for UTI nitrate positive with many bacteria  - Pure wick in place  - Ceftriaxone 1g q24 hours - first dose given on 4/10  - follow urine culture and susceptibiltiy

## 2022-04-10 NOTE — ASSESSMENT & PLAN NOTE
Dementia    - baseline mental status patient is oriented to self and family, has sitters at home and lives with son  - continue aricept nightly, namenda BID  - trazodone 150 mg nightly at 2000 --> increased to 200 mg  - hydroxyzine 25 mg prn for anxiety  - delirium precautions

## 2022-04-10 NOTE — SUBJECTIVE & OBJECTIVE
Interval History: Patient seen and examined today with daughter and son. Episode of hypoxia overnight requring 2L O2. CXR with evidence of atelectasis change versus scarring at the right lung base. Ordered incentive spirometry. Patient breathing comfortably on room air on exam but still feels too weak to ambulate. Patient's daughter reports that the patient is anxious as she does not want to be in the hospital. Increased trazodone to 200 mg nightly at 2000 and ordered hydroxyzine prn for anxiety. UA positive for UTI, started on ceftriaxone 1g q24 hours. Will follow cultures and susceptibility.    Review of Systems   Unable to perform ROS: Dementia   Genitourinary:  Positive for dysuria.   Neurological:  Positive for weakness.   Psychiatric/Behavioral:  Negative for confusion.    Objective:     Vital Signs (Most Recent):  Temp: 98 °F (36.7 °C) (04/10/22 1107)  Pulse: 93 (04/10/22 1107)  Resp: 18 (04/10/22 1107)  BP: 133/61 (04/10/22 1107)  SpO2: (!) 94 % (04/10/22 1110)   Vital Signs (24h Range):  Temp:  [97.8 °F (36.6 °C)-98.7 °F (37.1 °C)] 98 °F (36.7 °C)  Pulse:  [] 93  Resp:  [16-20] 18  SpO2:  [86 %-100 %] 94 %  BP: (133-196)/(61-91) 133/61     Weight: 50.5 kg (111 lb 5.3 oz)  Body mass index is 19.73 kg/m².    Intake/Output Summary (Last 24 hours) at 4/10/2022 1342  Last data filed at 4/10/2022 1300  Gross per 24 hour   Intake --   Output 400 ml   Net -400 ml      Physical Exam  Vitals and nursing note reviewed.   Constitutional:       General: She is not in acute distress.     Appearance: She is well-developed.   HENT:      Head: Normocephalic and atraumatic.      Mouth/Throat:      Mouth: Mucous membranes are dry.   Eyes:      Conjunctiva/sclera: Conjunctivae normal.      Pupils: Pupils are equal, round, and reactive to light.   Cardiovascular:      Rate and Rhythm: Normal rate and regular rhythm.      Heart sounds: Normal heart sounds.   Pulmonary:      Effort: Pulmonary effort is normal. No  respiratory distress.      Breath sounds: Normal breath sounds. No wheezing.   Abdominal:      General: Bowel sounds are normal. There is no distension.      Palpations: Abdomen is soft.      Tenderness: There is no abdominal tenderness.   Musculoskeletal:         General: Signs of injury (left hand) present. Normal range of motion.      Cervical back: Normal range of motion and neck supple.      Comments: Mild bruising noted to left hand, wrapped in dressing with splint in place   Skin:     General: Skin is warm and dry.      Capillary Refill: Capillary refill takes less than 2 seconds.      Findings: No rash.   Neurological:      General: No focal deficit present.      Mental Status: She is alert. Mental status is at baseline.      Cranial Nerves: No cranial nerve deficit.      Comments: Awake, alert, oriented to self and daughter at bedside (baseline). Following commands. Strength and sensation intact.    Psychiatric:         Behavior: Behavior normal.         Thought Content: Thought content normal.         Judgment: Judgment normal.       Significant Labs: All pertinent labs within the past 24 hours have been reviewed.  BMP:   Recent Labs   Lab 04/09/22  0606   GLU 99      K 3.8      CO2 25   BUN 19   CREATININE 0.7   CALCIUM 9.1   MG 1.9     CBC:   Recent Labs   Lab 04/08/22 2053 04/09/22  0606   WBC 14.27* 10.18   HGB 12.4 12.2   HCT 38.0 37.2    218       Significant Imaging: I have reviewed all pertinent imaging results/findings within the past 24 hours.

## 2022-04-10 NOTE — PLAN OF CARE
Problem: Fall Injury Risk  Goal: Absence of Fall and Fall-Related Injury  Outcome: Ongoing, Progressing     Problem: Skin Injury Risk Increased  Goal: Skin Health and Integrity  Outcome: Ongoing, Progressing     Problem: Adult Inpatient Plan of Care  Goal: Absence of Hospital-Acquired Illness or Injury  Outcome: Ongoing, Progressing     Problem: Adult Inpatient Plan of Care  Goal: Readiness for Transition of Care  Outcome: Ongoing, Progressing

## 2022-04-10 NOTE — PROGRESS NOTES
04/10/22 0000   Vital Signs   SpO2 (!) 86 %   Pulse Oximetry Type Intermittent   O2 Device (Oxygen Therapy) room air     Notified by tech of O2 sat 86% on RA. Upon assessment, pt noted to be mouth breathing. Placed on 2L BNC; educated pt on breathing through her nose. O2 sat increased to 96-98%. Resp reg, even, unlab. NAD noted. Dtr @ BS. TM.

## 2022-04-11 PROBLEM — Z71.89 ADVANCED CARE PLANNING/COUNSELING DISCUSSION: Status: ACTIVE | Noted: 2022-01-01

## 2022-04-11 PROBLEM — F03.90 DEMENTIA WITHOUT BEHAVIORAL DISTURBANCE: Chronic | Status: ACTIVE | Noted: 2022-01-01

## 2022-04-11 PROBLEM — J96.01 ACUTE RESPIRATORY FAILURE WITH HYPOXEMIA: Status: ACTIVE | Noted: 2022-01-01

## 2022-04-11 PROBLEM — J98.11 ATELECTASIS: Status: ACTIVE | Noted: 2022-01-01

## 2022-04-11 PROBLEM — J96.01 ACUTE RESPIRATORY FAILURE WITH HYPOXEMIA: Status: RESOLVED | Noted: 2022-01-01 | Resolved: 2022-01-01

## 2022-04-11 NOTE — PT/OT/SLP PROGRESS
"Physical Therapy Treatment    Patient Name:  Lang Kumar   MRN:  [id number]  Admitting Diagnosis: Closed displaced fracture of neck of fifth metacarpal bone of left hand  Recent Surgery: * No surgery found *      Recommendations:     Discharge Recommendations:  home health PT   Discharge Equipment Recommendations: wheelchair   Barriers to discharge: - Pt requiring increased assistance at current time.       Plan:     During this hospitalization, patient to be seen 3 x/week to address the above listed problems via gait training, therapeutic activities, therapeutic exercises, neuromuscular re-education  · Plan of Care Expires:  05/07/22   Plan of Care Reviewed with: patient, daughter (Celeste)    This Plan of care has been discussed with the patient who was involved in its development and understands and is in agreement with the identified goals and treatment plan    Subjective     Communicated with nurse (Lisa TORRES) prior to session.     Patient comments: "I'm falling, I don't want to fall!!"  Pt requesting to use "Dodie" her cane.  Daughter supposed to bring it next time she comes  Pain/Comfort:  · Pain Rating 1: 0/10  · Pain Rating Post-Intervention 1: 0/10    Objective:     Patient found with: bed alarm, oxygen, PureWick (splint to L UE)    Patient found sup in bed with HOB at 45* angle upon PT entry to room, agreeable to treatment.  Son present in the room.    RESPIRATORY STATUS:   2 LPM via NC    General Precautions: Standard, fall   Orthopedic Precautions:N/A   Braces:  (splint to L UE)       BED MOBILITY (vc's for hand placement sequencing of task):        Rolling to the R:  NT.       Rolling to the L:  Max A (2* pt resisting with fear of falling).        Sup > sit at the EOB:  Max A for trunk elevation and mgt of LE's, exiting on the L side.       Sit > sup:  Mod A for trunk and LE's.       Scooting hips to EOB with mod/min A                SITTING AT THE EDGE OF THE BED (15-20 min)   Assistance Level " Required: initially with min A for trunk then SBA with R UE support   Postural deviations noted: flexed trunk, slight post lean   Encouraged: upright posture, B feet flat on the floor        TRANSFERS  (vc's for hand placement, sequencing of task and safety)   Patient completed Sit <> Stand Transfer from EOB with mod/min A of 1 on 1st trial, then mod/max A of 1 on 2 subsequent trials for hip elevation and balance with R HHA x3 trial(s).  Pt demonstrates post lean, resistance to transitioning from sitting to standing, wide JERRY   Patient completed Stand <> Sit Transfer to EOB with min A for controlled descent with no assistive device      GAIT: along the EOB   Patient ambulated: 6-8 steps (at first laterally to the L then turned to the R and ambulated forward) Pt refuses performing further trials   Patient required: min to mod A for balance   Patient used:  R HHA   Gait Pattern observed: reciprocal gait   Gait Deviation(s): decreased jenny, decreased step length, decreased stride length, decreased toe-to-floor clearance, decreased weight-shifting ability and slight FFP, wide JERRY    Comments: vc's and tc's for directional guidance, step length, safety    **Limited distance 2* pt resisting activity with fear falling**      EDUCATION  Patient provided with daily orientation and goals of this PT session. They were educated to call for assistance and to transfer with hospital staff only.  Also, pt was educated on the effects of prolonged immobility and the importance of performing OOB activity and exercises to promote healing and reduce recovery time.  PTA speaks on the phone with pt's daughter, Celeste, and reports on pt's current level of function and required assistance.  Pt's daughter was made aware of recommended discharge disposition    Whiteboard updated with correct mobility information. RN/PCT notified.  Pt safe to transfer OOB/BTB and amb short distances with RN/PCT or family: Use HHA or pt's personal QC  "("Dodie") with mod/min A.    Patient left HOB elevated, with  all lines intact, call button in reach, bed alarm on and son present    AM-PAC 6 CLICK MOBILITY  Turning over in bed (including adjusting bedclothes, sheets and blankets)?: 2  Sitting down on and standing up from a chair with arms (e.g., wheelchair, bedside commode, etc.): 2  Moving from lying on back to sitting on the side of the bed?: 2  Moving to and from a bed to a chair (including a wheelchair)?: 2  Need to walk in hospital room?: 2  Climbing 3-5 steps with a railing?: 1  Basic Mobility Total Score: 11     Assessment:     Lang Kumar is a 89 y.o. female admitted with a medical diagnosis of Closed displaced fracture of neck of fifth metacarpal bone of left hand.  She presents with the following impairments/functional limitations:  weakness, impaired endurance, impaired sensation, impaired self care skills, impaired functional mobilty, gait instability, impaired balance, impaired cognition, decreased coordination, decreased safety awareness, impaired coordination. requiring significant assistance and verbal cues for bed mob, transfers and gait to prevent falls due to weakness, confusion and fear of falling.   Pt remains motivated to participate in PT session and will cont to benefit from skilled PT intervention.    Rehab Prognosis:  Fair/Good; patient would benefit from acute skilled PT services to address these deficits and reach maximum level of function.      GOALS:   Multidisciplinary Problems     Physical Therapy Goals        Problem: Physical Therapy    Goal Priority Disciplines Outcome Goal Variances Interventions   Physical Therapy Goal     PT, PT/OT Ongoing, Progressing     Description: Goals to be met by: 22     Patient will increase functional independence with mobility by performin. Supine to sit with MInimal Assistance  2. Sit to supine with MInimal Assistance  3. Sit to stand transfer with Contact Guard Assistance  4. " Gait  x 20 feet with Minimal Assistance using No Assistive Device or hand held assist.  5. Lower extremity exercise program x 20 reps per handout, with assistance as needed                       Time Tracking:     PT Received On: 04/11/22  PT Start Time: 1602     PT Stop Time: 1629  PT Total Time (min): 27 min     Billable Minutes: Gait Training 12 and Therapeutic Activity 15    Treatment Type: Treatment  PT/PTA: PTA     PTA Visit Number: 1       ELIA Ly.  Pager [phone]    4/11/2022    .

## 2022-04-11 NOTE — ASSESSMENT & PLAN NOTE
- UA positive for UTI nitrate positive with many bacteria  - Pure wick in place  - Ceftriaxone 1g q24 hours - first dose given on 4/10  - follow urine culture and susceptibiltiy- did not reflex to cx

## 2022-04-11 NOTE — SUBJECTIVE & OBJECTIVE
Interval History: Patient seen at bedside with daughter Bharti present, patient arouses to voice and is pleasant. Will answer yes or no questions, denies pain. Bharti is interested in hospice, CM notified    Review of Systems   Unable to perform ROS: Dementia   Genitourinary:  Positive for dysuria.   Neurological:  Positive for weakness.   Psychiatric/Behavioral:  Negative for confusion.    Objective:     Vital Signs (Most Recent):  Temp: 97 °F (36.1 °C) (04/11/22 1206)  Pulse: 85 (04/11/22 1213)  Resp: 20 (04/11/22 1206)  BP: (!) 144/68 (04/11/22 1206)  SpO2: 99 % (04/11/22 1206)   Vital Signs (24h Range):  Temp:  [97 °F (36.1 °C)-98.3 °F (36.8 °C)] 97 °F (36.1 °C)  Pulse:  [] 85  Resp:  [18-20] 20  SpO2:  [93 %-99 %] 99 %  BP: (123-194)/(66-90) 144/68     Weight: 50.5 kg (111 lb 5.3 oz)  Body mass index is 19.73 kg/m².  No intake or output data in the 24 hours ending 04/11/22 1344   Physical Exam  Vitals and nursing note reviewed.   Constitutional:       General: She is not in acute distress.     Appearance: She is well-developed.   HENT:      Head: Normocephalic and atraumatic.      Mouth/Throat:      Mouth: Mucous membranes are dry.   Eyes:      Extraocular Movements: Extraocular movements intact.   Cardiovascular:      Rate and Rhythm: Normal rate and regular rhythm.   Pulmonary:      Effort: Pulmonary effort is normal. No respiratory distress.      Breath sounds: Normal breath sounds.   Abdominal:      General: Abdomen is flat.      Palpations: Abdomen is soft.   Musculoskeletal:         General: Signs of injury (left hand) present. Normal range of motion.      Cervical back: Normal range of motion and neck supple.      Comments: Mild bruising noted to left hand, wrapped in dressing with splint in place   Skin:     General: Skin is warm and dry.   Neurological:      General: No focal deficit present.      Mental Status: She is alert.      Cranial Nerves: No cranial nerve deficit.      Comments: Awake,  alert, oriented to self and daughter at bedside (baseline). Following commands. Strength and sensation intact.    Psychiatric:         Mood and Affect: Mood normal.         Behavior: Behavior normal.       Significant Labs: All pertinent labs within the past 24 hours have been reviewed.    Significant Imaging: I have reviewed all pertinent imaging results/findings within the past 24 hours.

## 2022-04-11 NOTE — PT/OT/SLP PROGRESS
"Occupational Therapy   Treatment    Name: Lang Kumar  MRN: [id number]  Admitting Diagnosis:  Closed displaced fracture of neck of fifth metacarpal bone of left hand      Pre-op Diagnosis: * No surgery found *    Recommendations:     Discharge Recommendations: home health OT  Discharge Equipment Recommendations:  wheelchair (family requesting small w/c or transport w/c)  Barriers to discharge:   (requiring increased assist with mobility)    Assessment:     Lang Kumar is a 89 y.o. female with a medical diagnosis of Closed displaced fracture of neck of fifth metacarpal bone of left hand.  She presents with disorientation. Performance deficits affecting function are weakness, impaired endurance, impaired self care skills, impaired functional mobilty, gait instability, impaired balance, impaired cognition, decreased safety awareness, decreased upper extremity function, decreased ROM, impaired fine motor.     Patient agreeable to OT session and tolerated well. Patient presented alert and was able to follow one-step commands with min verbal/tactile cues to initiate, but demo'd improved follow through. Patient completed functional t/f sit<>stand x2 trials using (B)HHA with Max(A). Patient would benefit from continued OT services to address deficits and progress towards goals. Continue OT POC.    Rehab Prognosis:  Fair; patient would benefit from acute skilled OT services to address these deficits and reach maximum level of function.       Plan:     Patient to be seen 3 x/week to address the above listed problems via self-care/home management, therapeutic activities, therapeutic exercises  · Plan of Care Expires: 05/08/22  · Plan of Care Reviewed with: patient, son    Subjective   Patient and patient's son agreeable to therapy. Patient perseverate "I need pito." referring to cane as per son interpretation. Patient confused IV pole as her cane.     Pain/Comfort:  · Pain Rating 1: 0/10  · Pain Rating " Post-Intervention 1: 0/10    Objective:     Communicated with: RN and OTR prior to session.  Patient found HOB elevated with bed alarm, oxygen, PureWick ((L)UE cast) upon OT entry to room.  A client care conference was completed by the OTR and the ALMONTE prior to treatment by the ALMONTE to discuss the patient's POC and current status.    General Precautions: Standard, fall   Orthopedic Precautions:LUE non weight bearing   Braces:  ((L)UE cast)  Respiratory Status: Nasal cannula, flow 2 L/min     Occupational Performance:     Bed Mobility:    · Patient completed Supine to Sit with maximal assistance and HOB elevated with trunk and BLE management; increased time for mobility due to cognitive deficits  · Patient completed Sit to Supine with maximal assistance and HOB flatr with trunk and BLE management     Functional Mobility/Transfers:  · Patient completed Sit <> Stand Transfer with maximal assistance  with  hand-held assist    · x2 trials from EOB with max verbal/tactile cues for initiating, sequencing, proper body mechanics, and anterior lean  · Functional Mobility: ~4 lateral steps to (R) towards HOB using (B)HHA with Max(A), with max cues for weight shifting and sequencing    Activities of Daily Living:  · Did not assess today      [facility] 6 Click ADL: 12    Treatment & Education:  Instruction and facilitation in safe transfer techniques including weight shifting, sequencing, proper body mechanics  Patient does present fearful of falling during standing trials, but does participate with cues to initiate and did complete 4 lateral steps today. Patient requires cues to advance (R)UE and for weight shifting due to posterior lean during mobility  Education on positioning techniques to maintain skin and joint integrity as well as per ortho note for (L)UE elevation to prevent edema. Pillow placed under (L)UE. Son verbalized understanding.  Addressed all patient/family questions/concerns within SARITHA scope of practice.      Patient left HOB elevated with all lines intact, call button in reach and son presentEducation:      GOALS:   Multidisciplinary Problems     Occupational Therapy Goals        Problem: Occupational Therapy    Goal Priority Disciplines Outcome Interventions   Occupational Therapy Goal     OT, PT/OT Ongoing, Progressing    Description: Goals to be met by: 4/23/2022     Patient will increase functional independence with ADLs by performing:    Grooming while EOB with Minimal Assistance.  Sitting at edge of bed x15 minutes with Contact Guard Assistance.  Supine to sit with Minimal Assistance.  Step transfer with Contact Guard Assistance                     Time Tracking:     OT Date of Treatment: 04/11/22  OT Start Time: 1506  OT Stop Time: 1529  OT Total Time (min): 23 min    Billable Minutes:Therapeutic Activity 23    OT/SARITHA: SARITHA     SARITHA Visit Number: 1    4/11/2022

## 2022-04-11 NOTE — PROGRESS NOTES
[facility] [facility]  Progress Note    Patient Name: Lang Kumar  MRN: [id number]  Patient Class: OP- Observation   Admission Date: 4/8/2022  Length of Stay: 0 days  Attending Physician: Soraida Hoff MD  Primary Care Provider: Ursula Connelly MD        Subjective:     Principal Problem:Closed displaced fracture of neck of fifth metacarpal bone of left hand        HPI:  Lang Kumar is a 89 y.o. female with a PMHx of dementia and previous CVA who was brought to the ED by daughter for generalized weakness and inability to ambulate. Patient's daughter present at bedside to assist in history. The patient was in her usually state of health until today when her daughter noticed she was having trouble ambulating. Typically she ambulates independently to and from the restroom, but today she was struggling and had to hold onto the wall to maintain her balance. Daughter also noticed bruising and injury to her left hand. Patient was able to recall falling in her bedroom. Ms. Kumar is at her baseline mental status, oriented to self and family, with no worsening confusion. Patient has a history of UTIs and endorses dysuria. No other complaints.     ED: VSSAF. Labs without acute abnormality. CT hip and cervical spine without acute fracture. CT head without acute hemorrhage or infarct. XR L hand with acute mildly displaced fracture of the 5th metacarpal neck. Pt placed in splint.       Overview/Hospital Course:  Patient admitted to observation for fracture of 5th metacarpal. PT/OT consulted and recommend HH. Ortho consulted for acute fracture of 5th metacarpal neck who re-splinted in well-padded ulnar gutter splint. Report no surgical intervention necessary at this time and to follow up at the ortho clinic.UA unable to be obtained due to pure wick and incontinence. Bladder scan <100, will give fluids and plan for a straight cath to obtain UA. UA nitrate-positive for UTI,  started on ceftriaxone 1g q24 hours. Did not reflex to culture    Discussed goals of care with patient's daughter, Bharti. She is interested in hospice given her mother's dementia and decline. She states her mother is a DNR and would like to meet with hospice representative, case management notified.           Interval History: Patient seen at bedside with daughter Bharti present, patient arouses to voice and is pleasant. Will answer yes or no questions, denies pain. Bharit is interested in hospice, CM notified    Review of Systems   Unable to perform ROS: Dementia   Genitourinary:  Positive for dysuria.   Neurological:  Positive for weakness.   Psychiatric/Behavioral:  Negative for confusion.    Objective:     Vital Signs (Most Recent):  Temp: 97 °F (36.1 °C) (04/11/22 1206)  Pulse: 85 (04/11/22 1213)  Resp: 20 (04/11/22 1206)  BP: (!) 144/68 (04/11/22 1206)  SpO2: 99 % (04/11/22 1206)   Vital Signs (24h Range):  Temp:  [97 °F (36.1 °C)-98.3 °F (36.8 °C)] 97 °F (36.1 °C)  Pulse:  [] 85  Resp:  [18-20] 20  SpO2:  [93 %-99 %] 99 %  BP: (123-194)/(66-90) 144/68     Weight: 50.5 kg (111 lb 5.3 oz)  Body mass index is 19.73 kg/m².  No intake or output data in the 24 hours ending 04/11/22 1344   Physical Exam  Vitals and nursing note reviewed.   Constitutional:       General: She is not in acute distress.     Appearance: She is well-developed.   HENT:      Head: Normocephalic and atraumatic.      Mouth/Throat:      Mouth: Mucous membranes are dry.   Eyes:      Extraocular Movements: Extraocular movements intact.   Cardiovascular:      Rate and Rhythm: Normal rate and regular rhythm.   Pulmonary:      Effort: Pulmonary effort is normal. No respiratory distress.      Breath sounds: Normal breath sounds.   Abdominal:      General: Abdomen is flat.      Palpations: Abdomen is soft.   Musculoskeletal:         General: Signs of injury (left hand) present. Normal range of motion.      Cervical back: Normal range of  motion and neck supple.      Comments: Mild bruising noted to left hand, wrapped in dressing with splint in place   Skin:     General: Skin is warm and dry.   Neurological:      General: No focal deficit present.      Mental Status: She is alert.      Cranial Nerves: No cranial nerve deficit.      Comments: Awake, alert, oriented to self and daughter at bedside (baseline). Following commands. Strength and sensation intact.    Psychiatric:         Mood and Affect: Mood normal.         Behavior: Behavior normal.       Significant Labs: All pertinent labs within the past 24 hours have been reviewed.    Significant Imaging: I have reviewed all pertinent imaging results/findings within the past 24 hours.      Assessment/Plan:      * Closed displaced fracture of neck of fifth metacarpal bone of left hand  - XR with acute mildly displaced fracture of the left 5th metacarpal neck with adjacent soft tissue edema  - placed in splint in ED  - ortho consulted, placed an ulnar gutter splint and plan for outpatient follow-up    Major neurocognitive disorder due to multiple etiologies  Dementia    - baseline mental status patient is oriented to self and family, has sitters at home and lives with son  - continue aricept nightly, namenda BID  - trazodone 150 mg nightly at 2000 --> increased to 200 mg  - hydroxyzine 25 mg prn for anxiety  - delirium precautions    Advanced care planning/counseling discussion  Advance Care Planning     Date: 04/11/2022    Code Status  In light of the patients advanced and life limiting illness,I engaged the the patient and daughter Bharti in a conversation about the patient's preferences for care  at the very end of life. The patient wishes to have a natural, peaceful death.  Along those lines, the patient does not wish to have CPR or other invasive treatments performed when her heart and/or breathing stops. I communicated to the patient and family that a DNR form was completed and will be scanned  into EPIC.   GOC  I engaged the family in a conversation about advance care planning and we specifically addressed what the goals of care would be moving forward, in light of the patient's change in clinical status, specifically advanced dementia, recurrent UTI, metacarpal fracture.  We did specifically address the patient's likely prognosis, which is poor.  We explored the patient's values and preferences for future care.  The family endorses that what is most important right now is to focus on spending time at home, avoiding the hospital and quality of life, even if it means sacrificing a little time    Accordingly, we have decided that the best plan to meet the patient's goals includes meeting with hospice rep    I did explain the role for hospice care at this stage of the patient's illness, including its ability to help the patient live with the best quality of life possible.  We will be making a hospice referral if family decides to go that route after [facility] rep meets with family    I spent a total of 20 minutes engaging the patient in this advance care planning discussion.               UTI (urinary tract infection)  - UA positive for UTI nitrate positive with many bacteria  - Pure wick in place  - Ceftriaxone 1g q24 hours - first dose given on 4/10  - follow urine culture and susceptibiltiy- did not reflex to cx    Generalized weakness  Fall    Patient fell and was too weak to ambulate without holding onto the wall today. Patient typically ambulates independently. Has history of UTIs and follows with urology.    - VSSAF, labs without acute abormalities  - appears dry on exam  - suspect mechanical fall 2/2 weakness from dehydration and possible UTI  - CT cervical spine, CT head without acute process  - patient is incontinent so difficult to obtain UA in ED, f/u results   - positive for UTI - ceftriaxone 1g q24 hours  - PT/OT recommend HH  - fall precautions    Insomnia  - continue trazodone nightly -->  increased to 200 mg   - melatonin prn    Stroke  - continue ASA    Coronary artery disease involving native coronary artery of native heart without angina pectoris  - continue ASA  - not on statin      VTE Risk Mitigation (From admission, onward)         Ordered     enoxaparin injection 40 mg  Daily         04/09/22 0225     IP VTE HIGH RISK PATIENT  Once         04/09/22 0225     Place sequential compression device  Until discontinued         04/09/22 0225                Discharge Planning   MIGUEL ÁNGEL: 4/12/2022     Code Status: Full Code   Is the patient medically ready for discharge?: No    Reason for patient still in hospital (select all that apply): Patient trending condition and Pending disposition  Discharge Plan A: Home Health                  Shawnee Castaneda PA-C  Department of Hospital Medicine   [facility] - Telemetry Stepdown ([place]-7)

## 2022-04-11 NOTE — PLAN OF CARE
[facility] - Telemetry Stepdown ([facility]-7)  Discharge Assessment    Primary Care Provider: Ursula Connelly MD     Discharge Assessment (most recent)     BRIEF DISCHARGE ASSESSMENT - 04/11/22 1234        Discharge Planning    Assessment Type Discharge Planning Brief Assessment     Resource/Environmental Concerns none     Support Systems Children     Equipment Currently Used at Home cane, straight;walker, rolling;shower chair;bedside commode     Current Living Arrangements home/apartment/condo     Patient/Family Anticipates Transition to home with family     Patient/Family Anticipated Services at Transition home health care;hospice care     DME Needed Upon Discharge  wheelchair     Discharge Plan A Home Health     Discharge Plan B Hospice/home               Pt is a 89 y.o. female admitted with Closed displace Fx of L hand, she has a PMH of Dementia and CVA. She lives with her son in a single story house and she has 24 hour care b/t her 2 daughters and a private sitter. She can feed herself however she requires assistance with all other ADLs. [facility] Discharge Packet given to patient and/or family with understanding verbalized.   name and number and estimated discharge date written on white board in patient's room with request to call for any questions or concerns.  Will continue to follow for needs.  Thomas Messer, RN,BSN

## 2022-04-11 NOTE — PLAN OF CARE
Problem: Occupational Therapy  Goal: Occupational Therapy Goal  Description: Goals to be met by: 4/23/2022     Patient will increase functional independence with ADLs by performing:    Grooming while EOB with Minimal Assistance.  Sitting at edge of bed x15 minutes with Contact Guard Assistance.  Supine to sit with Minimal Assistance.  Step transfer with Contact Guard Assistance    Outcome: Ongoing, Progressing

## 2022-04-11 NOTE — ASSESSMENT & PLAN NOTE
Advance Care Planning     Date: 04/11/2022    Code Status  In light of the patients advanced and life limiting illness,I engaged the the patient and daughter Bharti in a conversation about the patient's preferences for care  at the very end of life. The patient wishes to have a natural, peaceful death.  Along those lines, the patient does not wish to have CPR or other invasive treatments performed when her heart and/or breathing stops. I communicated to the patient and family that a DNR form was completed [facility]  I engaged the family in a conversation about advance care planning and we specifically addressed what the goals of care would be moving forward, in light of the patient's change in clinical status, specifically advanced dementia, recurrent UTI, metacarpal fracture.  We did specifically address the patient's likely prognosis, which is poor.  We explored the patient's values and preferences for future care.  The family endorses that what is most important right now is to focus on spending time at home, avoiding the hospital and quality of life, even if it means sacrificing a little time    Accordingly, we have decided that the best plan to meet the patient's goals includes meeting with hospice rep    I did explain the role for hospice care at this stage of the patient's illness, including its ability to help the patient live with the best quality of life possible.  We will be making a hospice referral if family decides to go that route after [facility] rep meets with family    I spent a total of 20 minutes engaging the patient in this advance care planning discussion.

## 2022-04-12 NOTE — NURSING
Care assumed at 1900. Patient resting in bed with eyes open. AAOx1.  Denies pain at this time. Bed in lowest position. Call light within reach. Will continue to monitor. Safety maintained.

## 2022-04-12 NOTE — PLAN OF CARE
[facility] - [facility] ([place] [address])      HOME HEALTH ORDERS  FACE TO FACE ENCOUNTER    Patient Name: Lang Kumar  YOB: 1932    PCP: Ursula Connelly MD   PCP Address: [address] [facility] [place] [address]  PCP Phone Number: [phone]  PCP Fax: [phone]    Encounter Date: 4/8/22    Admit to Home Health    Diagnoses:  Active Hospital Problems    Diagnosis  POA    *Closed displaced fracture of neck of fifth metacarpal bone of left hand [S62.337A]  Yes     Priority: 1 - High    Major neurocognitive disorder due to multiple etiologies [F02.80]  Yes     Priority: 2      -2019 Neuropsych (MMSE=19/30). Differential diagnosis includes the following  --Alzheimer's Disease: Neuropsychological profile (disorientation to time, memory impairment), onset/course of cognitive/functional decline, and base rates suggest this is the most likely primary cause.   --Vascular Disease: Her vascular health issues and aspects of neuropsychological testing (very slow mental speed, slightly better than expected recognition memory (albeit still poor), worse phonemic fluency) also suggest a vascular component.  --Ongoing follow-up and below recommendations to monitor/manage her cognitive/functional decline and refine differential diagnosis      Advanced care planning/counseling discussion [Z71.89]  Not Applicable     Priority: 3     UTI (urinary tract infection) [N39.0]  Yes     Priority: 3     Dementia without behavioral disturbance [F03.90]  Yes     Chronic    Atelectasis [J98.11]  Yes    Generalized weakness [R53.1]  Yes    Insomnia [G47.00]  Yes     There has been some improvement with the use of trazodone      Stroke [I63.9]  Yes    Coronary artery disease involving native coronary artery of native heart without angina pectoris [I25.10]  Yes    Urge and stress incontinence [N39.46]  Yes     uses pessary; followed by gynecology        Resolved Hospital Problems    Diagnosis Date Resolved POA     Acute respiratory failure with hypoxemia [J96.01] 04/11/2022 Yes       Follow Up Appointments:  No future appointments.    Allergies:Review of patient's allergies indicates:  No Known Allergies    Medications: Review discharge medications with patient and family and provide education.    Current Facility-Administered Medications   Medication Dose Route Frequency Provider Last Rate Last Admin    acetaminophen tablet 650 mg  650 mg Oral Q4H PRN Verónica So PA-C   650 mg at 04/09/22 1649    albuterol-ipratropium 2.5 mg-0.5 mg/3 mL nebulizer solution 3 mL  3 mL Nebulization Q4H PRN Verónica So PA-C        aluminum-magnesium hydroxide-simethicone 200-200-20 mg/5 mL suspension 30 mL  30 mL Oral QID PRN Verónica So PA-C        amLODIPine tablet 5 mg  5 mg Oral Daily Stefany Soliman PA-C   5 mg at 04/12/22 1046    aspirin EC tablet 81 mg  81 mg Oral Daily Verónica So PA-C   81 mg at 04/12/22 1046    dextrose 10% bolus 125 mL  12.5 g Intravenous PRN Verónica So PA-C        dextrose 10% bolus 250 mL  25 g Intravenous PRN Verónica So PA-C        donepeziL tablet 10 mg  10 mg Oral QHS Verónica So PA-C   10 mg at 04/11/22 2004    enoxaparin injection 40 mg  40 mg Subcutaneous Daily Verónica So PA-C   40 mg at 04/11/22 1701    glucagon (human recombinant) injection 1 mg  1 mg Intramuscular PRN Verónica So PA-C        glucose chewable tablet 16 g  16 g Oral PRN Verónica So PA-C        glucose chewable tablet 24 g  24 g Oral PRN Verónica So PA-C        melatonin tablet 6 mg  6 mg Oral Nightly PRN Verónica So PA-C   6 mg at 04/10/22 2122    memantine tablet 10 mg  10 mg Oral BID Verónica So PA-C   10 mg at 04/12/22 1046    multivitamin tablet  1 tablet Oral Daily Verónica So PA-C   1 tablet at 04/12/22 1046    naloxone 0.4 mg/mL injection 0.02 mg  0.02 mg Intravenous NAVARRO GUEVARA  TOBI So        ondansetron disintegrating tablet 8 mg  8 mg Oral Q8H PRN Verónica So PA-C        oxyCODONE-acetaminophen  mg per tablet 1 tablet  1 tablet Oral Q6H PRN Stefany Soliman PA-C   1 tablet at 04/11/22 1958    oxyCODONE-acetaminophen 5-325 mg per tablet 1 tablet  1 tablet Oral Q4H PRN Stefany Soliman PA-C   1 tablet at 04/12/22 0616    polyethylene glycol packet 17 g  17 g Oral Daily PRN Verónica So PA-C   17 g at 04/12/22 1108    prochlorperazine injection Soln 5 mg  5 mg Intravenous Q6H PRN Verónica So PA-C        simethicone chewable tablet 80 mg  1 tablet Oral QID PRN Verónica So PA-C        sodium chloride 0.9% flush 10 mL  10 mL Intravenous Q8H PRN Verónica So PA-C        traZODone tablet 200 mg  200 mg Oral QHS Stefany Soliman PA-C   200 mg at 04/11/22 2004     Current Discharge Medication List      START taking these medications    Details   divalproex (DEPAKOTE) 250 MG EC tablet Take 1 tablet (250 mg total) by mouth every 8 (eight) hours as needed (non redirectable agitation).  Qty: 30 tablet, Refills: 0         CONTINUE these medications which have NOT CHANGED    Details   aspirin (ECOTRIN) 81 MG EC tablet Take 1 tablet (81 mg total) by mouth once daily.  Qty: 90 tablet, Refills: 3      donepeziL (ARICEPT) 10 MG tablet TAKE 1 TABLET EVERY EVENING  Qty: 90 tablet, Refills: 1    Associated Diagnoses: Dementia without behavioral disturbance, unspecified dementia type      ergocalciferol (VITAMIN D2) 50,000 unit Cap Take 1 capsule (50,000 Units total) by mouth every 7 days.  Qty: 4 capsule, Refills: 11    Associated Diagnoses: Vitamin D deficiency disease      memantine (NAMENDA) 10 MG Tab TAKE 1 TABLET (10 MG TOTAL) BY MOUTH 2 (TWO) TIMES DAILY.  Qty: 180 tablet, Refills: 3    Associated Diagnoses: Dementia without behavioral disturbance, unspecified dementia type      mirabegron (MYRBETRIQ) 25 mg Tb24 ER tablet Take 1 tablet (25  mg total) by mouth once daily.  Qty: 90 tablet, Refills: 3    Associated Diagnoses: Urinary urgency; Increased frequency of urination      multivitamin (ONE DAILY MULTIVITAMIN) per tablet Take 1 tablet by mouth once daily.      !! traZODone (DESYREL) 50 MG tablet       !! traZODone (DESYREL) 50 MG tablet TAKE 1 TABLET THREE TIMES DAILY  Qty: 270 tablet, Refills: 2    Associated Diagnoses: Insomnia, unspecified type       !! - Potential duplicate medications found. Please discuss with provider.            I have seen and examined this patient within the last 30 days. My clinical findings that support the need for the home health skilled services and home bound status are the following:no   Weakness/numbness causing balance and gait disturbance due to Fracture making it taxing to leave home.     Diet:   regular diet    Labs:  n/a    Referrals/ Consults  Physical Therapy to evaluate and treat. Evaluate for home safety and equipment needs; Establish/upgrade home exercise program. Perform / instruct on therapeutic exercises, gait training, transfer training, and Range of Motion.  Occupational Therapy to evaluate and treat. Evaluate home environment for safety and equipment needs. Perform/Instruct on transfers, ADL training, ROM, and therapeutic exercises.   to evaluate for community resources/long-range planning.    Activities:   activity as tolerated   NWB LUE    Nursing:   Agency to admit patient within 24 hours of hospital discharge unless specified on physician order or at patient request    SN to complete comprehensive assessment including routine vital signs. Instruct on disease process and s/s of complications to report to MD. Review/verify medication list sent home with the patient at time of discharge  and instruct patient/caregiver as needed. Frequency may be adjusted depending on start of care date.     Skilled nurse to perform up to 3 visits PRN for symptoms related to diagnosis    Notify MD if  SBP > 160 or < 90; DBP > 90 or < 50; HR > 120 or < 50; Temp > 101; O2 < 88%    Ok to schedule additional visits based on staff availability and patient request on consecutive days within the home health episode.    When multiple disciplines ordered:    Start of Care occurs on Sunday - Wednesday schedule remaining discipline evaluations as ordered on separate consecutive days following the start of care.    Thursday SOC -schedule subsequent evaluations Friday and Monday the following week.     Friday - Saturday SOC - schedule subsequent discipline evaluations on consecutive days starting Monday of the following week.        Miscellaneous   Routine Skin for Bedridden Patients: Instruct patient/caregiver to apply moisture barrier cream to all skin folds and wet areas in perineal area daily and after baths and all bowel movements.    Home Health Aide:  Physical Therapy Three times weekly, Occupational Therapy Three times weekly and Medical Social Work Weekly    Wound Care Orders  no    I certify that this patient is confined to her home and needs physical therapy and occupational therapy.

## 2022-04-12 NOTE — PLAN OF CARE
Pt accepted to [facility] , will start services on 4/14/22.    04/12/22 5571   Post-Acute Status   Post-Acute Authorization Home Health   Home Health Status Set-up Complete/Auth obtained   Hospital Resources/Appts/Education Provided Provided patient/caregiver with written discharge plan information;Appointments scheduled and added to S   Discharge Plan   Discharge Plan A Home Health   Discharge Plan B Home Health      Thomas Messer RN,BSN

## 2022-04-13 NOTE — PLAN OF CARE
[facility] - Tele[facility] ([place] [address])  Discharge Final Note    Primary Care Provider: Ursula Connelly MD      Future Appointments   Date Time Provider Department Center   4/19/2022  9:30 AM Jamie L. Russo-Digeorge, PA-C [facility]   4/19/2022  1:00 PM [facility]     Pt discharged home with Home Health.   Thomas Messer RN,BSN      Expected Discharge Date: 4/12/2022    Final Discharge Note (most recent)     Final Note - 04/13/22 0838        Final Note    Assessment Type Final Discharge Note     Anticipated Discharge Disposition Home or Self Care     Hospital Resources/Appts/Education Provided Provided patient/caregiver with written discharge plan information;Appointments scheduled and added to AVS        Post-Acute Status    Post-Acute Authorization Home Health     Home Health Status Set-up Complete/Auth obtained     Discharge Delays None known at this time                 Important Message from Medicare             Contact Info     [facility]        Next Steps: Follow up    Instructions: The Clinic will contact you within 48 hrs to arrange you first appointment. If you do not hear from them by then, please call [phone].

## 2022-04-13 NOTE — ASSESSMENT & PLAN NOTE
- UA positive for UTI nitrate positive with many bacteria  - Pure wick in place  - Ceftriaxone 1g q24 hours - first dose given on 4/10  - follow urine culture and susceptibiltiy- did not reflex to cx so will not continue tx

## 2022-04-13 NOTE — DISCHARGE SUMMARY
[facility] [address])  [facility] Medicine  Discharge Summary      Patient Name: Lang Kumar  MRN: [id number]  Patient Class: OP- Observation  Admission Date: 4/8/2022  Hospital Length of Stay: 0 days  Discharge Date and Time: 4/12/2022  6:04 PM  Attending Physician: Beatriz att. providers found   Discharging Provider: Shawnee Castaneda PA-C  Primary Care Provider: Ursula Connelly MD  [facility] Medicine Team: [facility] Shawnee Castaneda PA-C    HPI:   Lang Kumar is a 89 y.o. female with a PMHx of dementia and previous CVA who was brought to the ED by daughter for generalized weakness and inability to ambulate. Patient's daughter present at bedside to assist in history. The patient was in her usually state of health until today when her daughter noticed she was having trouble ambulating. Typically she ambulates independently to and from the restroom, but today she was struggling and had to hold onto the wall to maintain her balance. Daughter also noticed bruising and injury to her left hand. Patient was able to recall falling in her bedroom. Ms. Kumar is at her baseline mental status, oriented to self and family, with no worsening confusion. Patient has a history of UTIs and endorses dysuria. No other complaints.     ED: [facility]. Labs without acute abnormality. CT hip and cervical spine without acute fracture. CT head without acute hemorrhage or infarct. XR L hand with acute mildly displaced fracture of the 5th metacarpal neck. Pt placed in splint.       * No surgery found *      Hospital Course:   Patient admitted to observation for fracture of 5th metacarpal. PT/OT consulted and recommend HH. Ortho consulted for acute fracture of 5th metacarpal neck who re-splinted in well-padded ulnar gutter splint. Report no surgical intervention necessary at this time and to follow up at the ortho clinic.UA unable to be obtained due to pure wick and incontinence. Bladder scan <100, will give fluids  and plan for a straight cath to obtain UA. UA nitrate-positive for UTI, started on ceftriaxone 1g q24 hours. Did not reflex to culture, will not continue abx.    Discussed goals of care with patient's daughter, Bharti. She is interested in hospice given her mother's dementia and decline. She states her mother is a DNR and would like to meet with hospice representative, case management notified. Hospice met with patient, do not think she is a candidate at this time but will order CAH with palliative, neuropsych referral, [facility]. Stable for dc with ortho fu, JOSE MAGALLANES.No further questios           Goals of Care Treatment Preferences:  Code Status: DNR    Living Will: Yes     What is most important right now is to focus on spending time at home, avoiding the hospital, quality of life, even if it means sacrificing a little time.  Accordingly, we have decided that the best plan to meet the patient's goals includes meeting with hospice rep.      Consults:   Consults (From admission, onward)        Status Ordering Provider     Inpatient consult to Orthopedic Surgery  Once        Provider:  (Not yet assigned)    Completed REJI DING          * Closed displaced fracture of neck of fifth metacarpal bone of left hand  - XR with acute mildly displaced fracture of the left 5th metacarpal neck with adjacent soft tissue edema  - placed in splint in ED  - ortho consulted, placed an ulnar gutter splint and plan for outpatient follow-up: JOSE MAGALLANES    Major neurocognitive disorder due to multiple etiologies  Dementia    - baseline mental status patient is oriented to self and family, has sitters at home and lives with son  - continue aricept nightly, namenda BID  - trazodone 150 mg nightly at 2000 --> increased to 200 mg  - hydroxyzine 25 mg prn for anxiety  - delirium precautions  depakote prn at dc for nonrediretable agitation  neuropsych referral    Advanced care planning/counseling discussion  Advance Care Planning     Date:  04/11/2022    Code Status  In light of the patients advanced and life limiting illness,I engaged the the patient and daughter Bharti in a conversation about the patient's preferences for care  at the very end of life. The patient wishes to have a natural, peaceful death.  Along those lines, the patient does not wish to have CPR or other invasive treatments performed when her heart and/or breathing stops. I communicated to the patient and family that a DNR form was completed and will be scanned into EPIC.   GOC  I engaged the family in a conversation about advance care planning and we specifically addressed what the goals of care would be moving forward, in light of the patient's change in clinical status, specifically advanced dementia, recurrent UTI, metacarpal fracture.  We did specifically address the patient's likely prognosis, which is poor.  We explored the patient's values and preferences for future care.  The family endorses that what is most important right now is to focus on spending time at home, avoiding the hospital and quality of life, even if it means sacrificing a little time    Accordingly, we have decided that the best plan to meet the patient's goals includes meeting with hospice rep    I did explain the role for hospice care at this stage of the patient's illness, including its ability to help the patient live with the best quality of life possible.  We will be making a hospice referral if family decides to go that route after [facility] rep meets with family    I spent a total of 20 minutes engaging the patient in this advance care planning discussion.               UTI (urinary tract infection)  - UA positive for UTI nitrate positive with many bacteria  - Pure wick in place  - Ceftriaxone 1g q24 hours - first dose given on 4/10  - follow urine culture and susceptibiltiy- did not reflex to cx so will not continue tx      Final Active Diagnoses:    Diagnosis Date Noted POA    PRINCIPAL PROBLEM:  " Closed displaced fracture of neck of fifth metacarpal bone of left hand [S62.337A] 04/09/2022 Yes    Major neurocognitive disorder due to multiple etiologies [F02.80] 11/21/2018 Yes    Advanced care planning/counseling discussion [Z71.89] 04/11/2022 Not Applicable    UTI (urinary tract infection) [N39.0] 01/16/2017 Yes    Dementia without behavioral disturbance [F03.90] 04/11/2022 Yes     Chronic    Atelectasis [J98.11] 04/11/2022 Yes    Generalized weakness [R53.1] 04/09/2022 Yes    Insomnia [G47.00] 10/07/2021 Yes    Stroke [I63.9] 09/07/2020 Yes    Coronary artery disease involving native coronary artery of native heart without angina pectoris [I25.10] 11/30/2016 Yes    Urge and stress incontinence [N39.46]  Yes      Problems Resolved During this Admission:    Diagnosis Date Noted Date Resolved POA    Acute respiratory failure with hypoxemia [J96.01] 04/11/2022 04/11/2022 Yes       Discharged Condition: stable    Disposition: Home or Self Care    Follow Up:   Follow-up Information     [facility] Follow up.    Why: The Clinic will contact you within 48 hrs to arrange you first appointment. If you do not hear from them by then, please call [phone].                     Patient Instructions:      WHEELCHAIR FOR HOME USE     Order Specific Question Answer Comments   Hours in W/C per day: 12    Type of Wheelchair: Standard    Size(Width): 18"(STD adult)    Leg Support: STD footrests    Lap Belt: Buckle    Accessories: Front brakes    Cushion: Basic    Height: 5' 2.99" (1.6 m)    Weight: 50.5 kg (111 lb 5.3 oz)    Does patient have medical equipment at home? cane, straight    Length of need (1-99 months): 99    Please check all that apply: Caregiver is capable and willing to operate wheelchair safely.    Please check all that apply: Patient mobility limitations cannot be sufficiently resolved by the use of other ambulatory therapies.      Ambulatory referral/consult to Adult Neuropsychology "   Standing Status: Future   Referral Priority: Urgent Referral Type: Psychiatric   Referral Reason: Specialty Services Required   Requested Specialty: Psychology   Number of Visits Requested: 1     Ambulatory referral/consult to [facility]   Standing Status: Future   Referral Priority: Routine Referral Type: Consultation   Referral Reason: Specialty Services Required   Number of Visits Requested: 1     Ambulatory referral/consult to Outpatient Case Management   Referral Priority: Routine Referral Type: Consultation   Referral Reason: Specialty Services Required   Number of Visits Requested: 1     Notify your health care provider if you experience any of the following:  temperature >100.4     Notify your health care provider if you experience any of the following:  redness, tenderness, or signs of infection (pain, swelling, redness, odor or green/yellow discharge around incision site)     Notify your health care provider if you experience any of the following:  persistent nausea and vomiting or diarrhea     Notify your health care provider if you experience any of the following:  difficulty breathing or increased cough     Notify your health care provider if you experience any of the following:  worsening rash     Notify your health care provider if you experience any of the following:  increased confusion or weakness     Notify your health care provider if you experience any of the following:  persistent dizziness, light-headedness, or visual disturbances     Activity as tolerated       Significant Diagnostic Studies: Radiology: X-Ray: L hand:Fifth metacarpal fracture.    Pending Diagnostic Studies:     Procedure Component Value Units Date/Time    Basic Metabolic Panel (BMP) [[id number]] Collected: 04/10/22 0450    Order Status: Sent Lab Status: In process Updated: 04/10/22 0450    Specimen: Blood     CBC with Automated Differential [[id number]] Collected: 04/10/22 0450    Order  Status: Sent Lab Status: In process Updated: 04/10/22 0450    Specimen: Blood     Magnesium [[id number]] Collected: 04/10/22 0450    Order Status: Sent Lab Status: In process Updated: 04/10/22 0450    Specimen: Blood     Phosphorus [[id number]] Collected: 04/10/22 0450    Order Status: Sent Lab Status: In process Updated: 04/10/22 0450    Specimen: Blood          Medications:  Reconciled Home Medications:      Medication List      START taking these medications    divalproex 250 MG EC tablet  Commonly known as: DEPAKOTE  Take 1 tablet (250 mg total) by mouth every 8 (eight) hours as needed (non redirectable agitation).     HYDROcodone-acetaminophen 5-325 mg per tablet  Commonly known as: NORCO  Take 1 tablet by mouth every 8 (eight) hours as needed for Pain.        CONTINUE taking these medications    aspirin 81 MG EC tablet  Commonly known as: ECOTRIN  Take 1 tablet (81 mg total) by mouth once daily.     donepeziL 10 MG tablet  Commonly known as: ARICEPT  TAKE 1 TABLET EVERY EVENING     ergocalciferol 50,000 unit Cap  Commonly known as: VITAMIN D2  Take 1 capsule (50,000 Units total) by mouth every 7 days.     memantine 10 MG Tab  Commonly known as: NAMENDA  TAKE 1 TABLET (10 MG TOTAL) BY MOUTH 2 (TWO) TIMES DAILY.     MYRBETRIQ 25 mg Tb24 ER tablet  Generic drug: mirabegron  Take 1 tablet (25 mg total) by mouth once daily.     ONE DAILY MULTIVITAMIN per tablet  Generic drug: multivitamin  Take 1 tablet by mouth once daily.     * traZODone 50 MG tablet  Commonly known as: DESYREL     * traZODone 50 MG tablet  Commonly known as: DESYREL  TAKE 1 TABLET THREE TIMES DAILY         * This list has 2 medication(s) that are the same as other medications prescribed for you. Read the directions carefully, and ask your doctor or other care provider to review them with you.                Indwelling Lines/Drains at time of discharge:   Lines/Drains/Airways     Drain  Duration                Drain/Device  08/19/14   2794 days           Epidural Line  Duration                Perineural Analgesia/Anesthesia Assessment (using dermatomes) Epidural 08/16/18 1122 1336 days                Time spent on the discharge of patient: >35 minutes     discsussed with staff    Shawnee Castaneda PA-C  Department of Hospital Medicine  [facility] - Telemetry Stepdown ([place]-)

## 2022-04-13 NOTE — ASSESSMENT & PLAN NOTE
- XR with acute mildly displaced fracture of the left 5th metacarpal neck with adjacent soft tissue edema  - placed in splint in ED  - ortho consulted, placed an ulnar gutter splint and plan for outpatient follow-up: JOSE MAGALLANES

## 2022-04-13 NOTE — ASSESSMENT & PLAN NOTE
Advance Care Planning     Date: 04/11/2022    Code Status  In light of the patients advanced and life limiting illness,I engaged the the patient and daughter Bharti in a conversation about the patient's preferences for care  at the very end of life. The patient wishes to have a natural, peaceful death.  Along those lines, the patient does not wish to have CPR or other invasive treatments performed when her heart and/or breathing stops. I communicated to the patient and family that a [facility] was [facility]  I engaged the family in a conversation about advance care planning and we specifically addressed what the goals of care would be moving forward, in light of the patient's change in clinical status, specifically advanced dementia, recurrent UTI, metacarpal fracture.  We did specifically address the patient's likely prognosis, which is poor.  We explored the patient's values and preferences for future care.  The family endorses that what is most important right now is to focus on spending time at home, avoiding the hospital and quality of life, even if it means sacrificing a little time    Accordingly, we have decided that the best plan to meet the patient's goals includes meeting with hospice rep    I did explain the role for hospice care at this stage of the patient's illness, including its ability to help the patient live with the best quality of life possible.  We will be making a hospice referral if family decides to go that route after [facility] rep meets with family    I spent a total of 20 minutes engaging the patient in this advance care planning discussion.

## 2022-04-13 NOTE — ASSESSMENT & PLAN NOTE
Dementia    - baseline mental status patient is oriented to self and family, has sitters at home and lives with son  - continue aricept nightly, namenda BID  - trazodone 150 mg nightly at 2000 --> increased to 200 mg  - hydroxyzine 25 mg prn for anxiety  - delirium precautions  depakote prn at dc for nonrediretable agitation  neuropsych referral

## 2022-04-16 NOTE — TELEPHONE ENCOUNTER
"Hospitalized last week for a fall, had a hand fracture. Had a uti at same time which contributed to fall daughter states. Discharged on Tuesday this week after being admitted Friday. Home health came out and was seen by rn/pt/ot but was also supposed to be seen by palliative care. Takes trazadone at hs, prescribed anxiety medication as well. Not sleeping well at all despite those medications, and pulling off cast because she has severe dementia. Daughter would like medication to help her rest. Not[facility] in computer [facility] "[facility] and [facility]". Advised she has the order so if [facility] is ordered they should be addressing both medical and palliative and should be able to address helping her obtain medication to help her sleep. She agrees to call home health company this weekend to see what can be done to help her mother. Please contact caller with any further care advice.     Reason for Disposition   Health Information question, no triage required and triager able to answer question    Protocols used: INFORMATION ONLY CALL-A-AH      "

## 2022-04-19 PROBLEM — F02.818 LATE ONSET ALZHEIMER'S DEMENTIA WITH BEHAVIORAL DISTURBANCE: Status: ACTIVE | Noted: 2022-01-01

## 2022-04-19 PROBLEM — G30.1 LATE ONSET ALZHEIMER'S DEMENTIA WITH BEHAVIORAL DISTURBANCE: Status: ACTIVE | Noted: 2022-01-01

## 2022-04-19 NOTE — ASSESSMENT & PLAN NOTE
Progressing  Namenda and Aricept in place  AOx1, incontinent, losing mobility and appetite.  Recent fall  End of life discussion and pt is DNR  Daughters are hospice agreeable when appropriate

## 2022-04-19 NOTE — PROGRESS NOTES
[facility]  Medical Home Visit    Visit Date: 4/19/2022  Encounter Provider: Guerda Salazar  PCP:  Ursula Connelly MD    Subjective:      Patient ID: Lang Kumar is a 89 y.o. female.    Consult Requested By:  Shawnee Castaneda  Reason for Consult:  Establish Care, Dementia    Lang is being seen at home due to being seen at home due to physical debility that presents a taxing effort to leave the home, to mitigate high risk of hospital readmission and/or due to the limited availability of reliable or safe options for transportation to the point of access to the provider. Prior to treatment on this visit the chart was reviewed and patient verbal consent was obtained.    Chief Complaint: Establish Care, Dementia, and Anxiety      Pt is being seen today in her home to establish home based care. She has dementia and is becoming less mobile. She recently experienced a fall and has a left arm fracture. She is being followed by ortho.    Today, she is found in her home with her 2 daughters present. She is lying on couch. She is not very verbal will answer questions but in a only 1-2 words. She is oriented to self and place. Daughters report her appetite is decreasing, she is incontinent. She is max assist for all ADLs. She can ambulate with assistance but is unsteady, recently had a fall.  They report she is very anxious, more so since the hospital visit. She is agitated at times and not sleeping at night. She has Trazadone 150mg nightly without any improvement in sleep. She was tried on Depakote out of hospital but they have stopped this medication due to diarhea.  They report she has previous been on SSRI for anxiety but did not tolerate that med either.    She currently has PT/OT in place. Family has recently hired private sitters to assist with her care. They are having someone with her around the clock.  They are looking into resources to assist in her care. Hospice was discussed during her admit but she  was considered to meet admit criteria.  Reviewed VA aid and assist, [place] on Aging, will consult  to assist.    Pt is a DNR and wants to avoid hospitalization per daughters. Both daughters have POA  Discussion held with pt and/or family related to advanced care planning.  Discussed end of life goals and pt's wishes regarding end of care.  Reviewed living will, [place], and pt's wishes regarding life support and tube feeding.  Reviewed pt's current code status and prognosis.   Copy of [facility] advanced care planning packet and [facility] given to family for review and discussion.  All questions answered and paperwork completed or review at next appt  30 minutes spent in discussion of these services.           Review of Systems   Constitutional: Positive for activity change and appetite change. Negative for fatigue and fever.   HENT: Negative.    Eyes: Negative.    Respiratory: Negative for chest tightness.    Cardiovascular: Negative.  Negative for leg swelling.   Gastrointestinal: Negative.    Endocrine: Negative.    Genitourinary:        Incontinent   Musculoskeletal: Positive for gait problem.   Skin: Negative.    Allergic/Immunologic: Negative.    Hematological: Negative.    Psychiatric/Behavioral: Positive for confusion. Negative for agitation. The patient is nervous/anxious.    All other systems reviewed and are negative.      Assessments:  · Environmental: single story home, 24 hr caregivers, clean, open paths, adequate light and temp  · Functional Status: max assist for all ADLs  · Safety: fall risk, high risk for skin breakdown  · Nutritional: adequate available, poor intake  · Home Health/DME/Supplies: [facility],     Objective:   Physical Exam  Vitals reviewed.   Constitutional:       General: She is not in acute distress.     Appearance: She is well-developed.      Comments: Thin frail appearing   HENT:      Head: Normocephalic and atraumatic.   Eyes:      Pupils: Pupils are equal, round, and  reactive to light.   Cardiovascular:      Rate and Rhythm: Normal rate and regular rhythm.      Heart sounds: Normal heart sounds.   Pulmonary:      Effort: Pulmonary effort is normal.      Breath sounds: Normal breath sounds.   Abdominal:      General: Bowel sounds are normal.      Palpations: Abdomen is soft.   Musculoskeletal:      Cervical back: Normal range of motion and neck supple.      Comments: Left hand in splint   Skin:     General: Skin is warm and dry.   Neurological:      Mental Status: She is alert.      Cranial Nerves: No cranial nerve deficit.      Comments: Oriented to self and town   Psychiatric:         Mood and Affect: Mood normal.         Vitals:    04/19/22 1214   Resp: 18   Weight: 50.3 kg (111 lb)     Body mass index is 20.3 kg/m².    Assessment:     1. Anxiety    2. Advanced care planning/counseling discussion    3. Major neurocognitive disorder due to multiple etiologies    4. Late onset Alzheimer's dementia with behavioral disturbance    5. Impaired mobility and activities of daily living    6. Atherosclerosis of aortic arch        Plan:     Ethical / Legal: Advance Care Planning   · Surrogate decision maker:  Name Lauren, Relationship: daughter  · Code Status:  DNR  · [place]:  On file  · Capacity to make medical decisions: Impaired       Problem List Items Addressed This Visit        Neuro    Major neurocognitive disorder due to multiple etiologies    Overview     -2019 Neuropsych (MMSE=19/30). Differential diagnosis includes the following  --Alzheimer's Disease: Neuropsychological profile (disorientation to time, memory impairment), onset/course of cognitive/functional decline, and base rates suggest this is the most likely primary cause.   --Vascular Disease: Her vascular health issues and aspects of neuropsychological testing (very slow mental speed, slightly better than expected recognition memory (albeit still poor), worse phonemic fluency) also suggest a vascular  component.  --Ongoing follow-up and below recommendations to monitor/manage her cognitive/functional decline and refine differential diagnosis           Current Assessment & Plan     See dementia           Late onset Alzheimer's dementia with behavioral disturbance    Current Assessment & Plan     Progressing  Namenda and Aricept in place  AOx1, incontinent, losing mobility and appetite.  Recent fall  End of life discussion and pt is DNR  Daughters are hospice agreeable when appropriate           Relevant Medications    QUEtiapine (SEROQUEL) 25 MG Tab    Other Relevant Orders    HOSPITAL BED FOR HOME USE    HOME HEALTH ORDERS       Psychiatric    Anxiety - Primary    Current Assessment & Plan     Worsening since fall and hospitalization  Difficulty sleeping, trazadone 150mg no effective  Start Busipar 10mg BID and Seroquel 25 mg at bedtime.  Will titrate to symptom relief  Educated on fall risk  Monitor           Relevant Medications    busPIRone (BUSPAR) 10 MG tablet       Cardiac/Vascular    Atherosclerosis of aortic arch    Overview     - noted on CXR 11/2016           Current Assessment & Plan     Stable  ASA in place  Monitor              Palliative Care    Advanced care planning/counseling discussion      Other Visit Diagnoses     Impaired mobility and activities of daily living        Relevant Orders    HOSPITAL BED FOR HOME USE         Pharmacy reports insurance kicked back Seroquel as pt has Norco fill recently.  Pt is not using Norco at this time as fracture pain is improving.  Instructed family not to use together, daughter stated understanding  Were controlled substances prescribed?  No    Follow Up Appointments:   Future Appointments   Date Time Provider Department Center   4/26/2022  9:00 AM SAHRA Navas [facility]   5/17/2022  9:30 AM Jamie L. Russo-Digeorge, PA-C [facility] ORTH[facility] [address]       Signature: Guerda Salazar NP

## 2022-04-19 NOTE — PROGRESS NOTES
[facility]  History & Physical  Orthopedics    SUBJECTIVE:      COVID-19 attestation:  This patient was treated during the COVID-19 pandemic.  This was discussed with the patient, they are aware of our current policies and procedures, were given the option of delaying their visit and or switching to a virtual visit, delaying their surgery when applicable, and they elect to proceed.    Chief Complaint: Left hand fracture    Referring Provider: Self, Aaareferral     History of Present Illness:  Patient is a 89 y.o. right hand dominant female who presents today with her caregivers with complaints of left hand fracture, injury occurred on 4/8/22. She was seen in the ED and treated for displaced boxers fracture, reduction performed and splinted. She has alzheimer's and is on palliative care, her caregivers report that she has been removing the splint. She is given tylenol as needed and norco at night if she complains of pain.    Onset of symptoms/DOI was 4/8/22.    Symptoms are aggravated by activity and movement.    Symptoms are alleviated by rest and immobilization.    Symptoms consist of pain, swelling, ecchymosis and decreased ROM.    The patient rates their pain as: unknown scale    Attempted treatment(s) and/or interventions include activity modifications, rest, splinting/casting.     The patient denies any fevers, chills, N/V, D/C and presents for evaluation.       Past Medical History:   Diagnosis Date    Abnormal exam or test finding 12/2011    coronary calcium score 204 = moderate plaque burden and high coronary heart disease risk    Acute respiratory failure with hypoxemia 4/11/2022    Anxiety 10/7/2021    Arthritis     Atherosclerosis of aortic arch     - noted on CXR 11/2016    Borderline hyperlipidemia     Coronary artery disease involving native coronary artery of native heart without angina pectoris 11/30/2016    Insomnia 10/7/2021    Major neurocognitive disorder due to  multiple etiologies     Osteoporosis, post-menopausal     Stroke 9/7/2020    Urge and stress incontinence     Vitamin D deficiency disease      Past Surgical History:   Procedure Laterality Date    CATARACT EXTRACTION, BILATERAL      COSMETIC SURGERY      [place] Colpocleisis  08/19/2014    perineorrhaphy and posterior colporhaphy done concommitantly    OPEN REDUCTION AND INTERNAL FIXATION (ORIF) OF FRACTURE OF DISTAL HUMERUS Left 8/16/2018    Procedure: ORIF, FRACTURE, HUMERUS, DISTAL, LEFT;  Surgeon: Sonu Carmona MD;  Location: [facility] OR [address] [place] [address];  Service: Orthopedics;  Laterality: Left;     Review of patient's allergies indicates:  No Known Allergies  Social History     Social History Narrative    Not on file     Family History   Problem Relation Age of Onset    Lung cancer Sister         smoker    Coronary artery disease Father     Stroke Mother     Fibromyalgia Sister     Arthritis Sister         back and knee     Breast cancer Neg Hx     Ovarian cancer Neg Hx     Diabetes Neg Hx     Colon cancer Neg Hx          Current Outpatient Medications:     aspirin (ECOTRIN) 81 MG EC tablet, Take 1 tablet (81 mg total) by mouth once daily., Disp: 90 tablet, Rfl: 3    divalproex (DEPAKOTE) 250 MG EC tablet, Take 1 tablet (250 mg total) by mouth every 8 (eight) hours as needed (non redirectable agitation)., Disp: 30 tablet, Rfl: 0    donepeziL (ARICEPT) 10 MG tablet, TAKE 1 TABLET EVERY EVENING, Disp: 90 tablet, Rfl: 1    ergocalciferol (VITAMIN D2) 50,000 unit Cap, Take 1 capsule (50,000 Units total) by mouth every 7 days., Disp: 4 capsule, Rfl: 11    HYDROcodone-acetaminophen (NORCO) 5-325 mg per tablet, Take 1 tablet by mouth nightly as needed for Pain., Disp: 10 tablet, Rfl: 0    memantine (NAMENDA) 10 MG Tab, TAKE 1 TABLET (10 MG TOTAL) BY MOUTH 2 (TWO) TIMES DAILY., Disp: 180 tablet, Rfl: 3    mirabegron (MYRBETRIQ) 25 mg Tb24 ER tablet, Take 1 tablet (25 mg total) by mouth once daily.,  "Disp: 90 tablet, Rfl: 3    multivitamin (ONE DAILY MULTIVITAMIN) per tablet, Take 1 tablet by mouth once daily., Disp: , Rfl:     traZODone (DESYREL) 50 MG tablet, , Disp: , Rfl:     traZODone (DESYREL) 50 MG tablet, TAKE 1 TABLET THREE TIMES DAILY, Disp: 270 tablet, Rfl: 2      Review of Systems:  Constitutional: no fever or chills  Eyes: no visual changes  ENT: no nasal congestion or sore throat  Respiratory: no cough or shortness of breath  Cardiovascular: no chest pain  Gastrointestinal: no nausea or vomiting, tolerating diet  Musculoskeletal: pain, soreness and decreased ROM    OBJECTIVE:      Vital Signs (Most Recent):  Vitals:    04/19/22 0922   Weight: 50.5 kg (111 lb 5.3 oz)   Height: 5' 2" (1.575 m)     Body mass index is 20.36 kg/m².      Physical Exam:  Constitutional: The patient appears well-developed and well-nourished. No distress.   Skin: No lesions appreciated  Head: Normocephalic and atraumatic.   Nose: Nose normal.   Ears: No deformities seen  Eyes: Conjunctivae and EOM are normal.   Neck: No tracheal deviation present.   Cardiovascular: Normal rate and intact distal pulses.    Pulmonary/Chest: Effort normal. No respiratory distress.   Abdominal: There is no guarding.   Neurological: The patient is alert.   Psychiatric: The patient has a normal mood and affect.     Left Hand/Wrist Examination:    Observation/Inspection:  Swelling  Pos   Deformity  none  Discoloration  Pos    Scars   none    Atrophy  none    HAND/WRIST EXAMINATION:  Finkelstein's Test   Neg  WHAT Test    Neg  Snuff box tenderness   Neg  Mobley's Test    Neg  Hook of Hamate Tenderness  Neg  CMC grind    Neg  Circumduction test   Neg  She does not respond to palpation of the 5th metacarpal head    Neurovascular Exam:  Digits WWP, brisk CR < 3s throughout  NVI motor/LTS to M/R/U nerves, radial pulse 2+    ROM of the hand/wrist/elbow unable to be tested due to Alzheimer's.    Abdomen not guarded  Respirations nonlabored  Perfusion " intact    Diagnostic Results:     Imaging - I independently viewed the patient's imaging as well as the radiology report.      FINDINGS:  Reconfirmed findings of now subacute (possibly comminuted) fracture involving the 5th metacarpal diametaphyseal region.  As before, there is fracture apex ulnar and dorsal angulation and overriding at the fracture site.  As of yet no obvious developing callus or periosteal reaction about the fracture site.  No new fractures.  Stable osteoarthritic changes involving scaphoid trapezium, trapezium 1st metacarpal, 1st MCP, thumb IP, finger DIP, less so finger PIP articulations.  Soft tissue swelling dorsally at the level of the MCP articulations and metacarpals.     Impression:     As above.       ASSESSMENT/PLAN:      89 y.o. yo female with Left 5th metacarpal head fracture 11 days from injury    Plan: The patient and I had a thorough discussion today.  We discussed the working diagnosis as well as several other potential alternative diagnoses.  Dr. Hannon and I have reviewed films, he recommends TKO brace which she can wear as much as possible, ok if she removes the brace, she does not weight bear through the hand at baseline. Tylenol and Ibuprofen throughout the day, Norco #10 refilled today for night time pain if she complains. I will see her back in 4 weeks for repeat films, at which point can likely discontinue with brace.    Should the patient's symptoms worsen, persist, or fail to improve they should return for reevaluation and I would be happy to see them back anytime.           Please do not hesitate to reach out to us via email, phone, or MyChart with any questions, concerns, or feedback.

## 2022-04-19 NOTE — ASSESSMENT & PLAN NOTE
Worsening since fall and hospitalization  Difficulty sleeping, trazadone 150mg no effective  Start Busipar 10mg BID and Seroquel 25 mg at bedtime.  Will titrate to symptom relief  Educated on fall risk  Monitor

## 2022-04-20 NOTE — PROGRESS NOTES
ASHLEY contacted patient daughter Bharti regarding referral. ASHLEY explained to daughter reason for referral to assist with community resources. Daughter reports HH ASHLEY granda is assisting with social needs. ASHLEY will close case as needs being met by others.

## 2022-05-13 NOTE — PROGRESS NOTES
Daughter reports mom is having UTI symptoms, same as previous UTI. She bought a home testing kit and it tested pos for UTI.  She is concerned mother required hospitalization with last UTI.  Meds to pharmacy, increase fluids

## 2022-05-26 NOTE — PROGRESS NOTES
Health Maintenance Due   Topic Date Due    Shingles Vaccine (2 of 3) 01/23/2012    TETANUS VACCINE  09/13/2015    DEXA Scan  08/19/2021    COVID-19 Vaccine (4 - Booster) 03/13/2022      updated.  Pt receiving palliative home health care    Dara Lima LPN   Clinical Care Coordinator  Primary Care and Wellness

## 2022-06-10 ENCOUNTER — DOCUMENT SCAN (OUTPATIENT)
Dept: HOME HEALTH SERVICES | Facility: HOSPITAL | Age: 87
End: 2022-06-10
Payer: MEDICARE

## (undated) DEVICE — SEE MEDLINE ITEM 157150

## (undated) DEVICE — SPONGE LAP 18X18 PREWASHED

## (undated) DEVICE — SEE MEDLINE ITEM 152622

## (undated) DEVICE — SEE MEDLINE ITEM 157173

## (undated) DEVICE — BLADE SURGICAL S/S STR #15

## (undated) DEVICE — SEE MEDLINE ITEM 152529

## (undated) DEVICE — BIT BRILL 2.5

## (undated) DEVICE — DRAPE C ARM 42 X 120 10/BX

## (undated) DEVICE — SEE MEDLINE ITEM 146292

## (undated) DEVICE — IMMOBILIZER SHOULDER RAINBOW M

## (undated) DEVICE — CATH SUCTION 10FR

## (undated) DEVICE — STOCKINET 4INX48

## (undated) DEVICE — BIT DRILL 2.0MM D DEPTH 110MM

## (undated) DEVICE — SUT VICRYL PLUS 0 CT1 18IN

## (undated) DEVICE — ADHESIVE DERMABOND ADVANCED

## (undated) DEVICE — SUT ETHILON 3/0 18IN PS-1

## (undated) DEVICE — IMPLANTABLE DEVICE
Type: IMPLANTABLE DEVICE | Site: ARM | Status: NON-FUNCTIONAL
Removed: 2018-08-16

## (undated) DEVICE — SPLINT CONFORMABLE 4 X 3

## (undated) DEVICE — PAD CAST SPECIALIST STRL 4

## (undated) DEVICE — PADDING CAST SPECIALIST 6X4YD

## (undated) DEVICE — GOWN SURGICAL X-LARGE

## (undated) DEVICE — GAUZE SPONGE 4X4 12PLY

## (undated) DEVICE — SEE MEDLINE ITEM 152515

## (undated) DEVICE — SUT VICRYL PLUS 2-0

## (undated) DEVICE — ELECTRODE REM PLYHSV RETURN 9

## (undated) DEVICE — TRAY MINOR ORTHO

## (undated) DEVICE — TAPE SURG DURAPORE 2 X10YD

## (undated) DEVICE — SEE MEDLINE ITEM 146417

## (undated) DEVICE — APPLICATOR CHLORAPREP ORN 26ML

## (undated) DEVICE — DRAPE PLASTIC U 60X72

## (undated) DEVICE — DRESSING N ADH OIL EMUL 3X8

## (undated) DEVICE — DRAPE STERI U-SHAPED 47X51IN

## (undated) DEVICE — SHEET DRAPE FAN-FOLDED 3/4

## (undated) DEVICE — DRESSING N ADH OIL EMUL 3X3

## (undated) DEVICE — SEE MEDLINE ITEM 157131